# Patient Record
Sex: MALE | Race: WHITE | Employment: OTHER | URBAN - METROPOLITAN AREA
[De-identification: names, ages, dates, MRNs, and addresses within clinical notes are randomized per-mention and may not be internally consistent; named-entity substitution may affect disease eponyms.]

---

## 2018-07-02 PROBLEM — R41.89 OTHER SYMPTOMS AND SIGNS INVOLVING COGNITIVE FUNCTIONS AND AWARENESS: Status: ACTIVE | Noted: 2018-07-02

## 2018-07-02 PROBLEM — R41.89 COGNITIVE CHANGES: Status: ACTIVE | Noted: 2018-07-02

## 2018-07-02 PROBLEM — K59.01 SLOW TRANSIT CONSTIPATION: Status: ACTIVE | Noted: 2018-07-02

## 2018-10-29 PROBLEM — G20 COGNITIVE DEFICIT DUE TO PARKINSON'S DISEASE (HCC): Status: ACTIVE | Noted: 2018-07-02

## 2019-05-03 ENCOUNTER — ANESTHESIA (OUTPATIENT)
Dept: ENDOSCOPY | Age: 84
End: 2019-05-03
Payer: MEDICARE

## 2019-05-03 ENCOUNTER — ANESTHESIA EVENT (OUTPATIENT)
Dept: ENDOSCOPY | Age: 84
End: 2019-05-03
Payer: MEDICARE

## 2019-05-03 ENCOUNTER — HOSPITAL ENCOUNTER (OUTPATIENT)
Age: 84
Setting detail: OUTPATIENT SURGERY
Discharge: HOME | End: 2019-05-03
Attending: INTERNAL MEDICINE | Admitting: INTERNAL MEDICINE
Payer: MEDICARE

## 2019-05-03 VITALS
TEMPERATURE: 98 F | RESPIRATION RATE: 14 BRPM | WEIGHT: 165 LBS | HEIGHT: 68 IN | OXYGEN SATURATION: 98 % | BODY MASS INDEX: 25.01 KG/M2 | HEART RATE: 62 BPM | DIASTOLIC BLOOD PRESSURE: 86 MMHG | SYSTOLIC BLOOD PRESSURE: 191 MMHG

## 2019-05-03 PROCEDURE — 88305 TISSUE EXAM BY PATHOLOGIST: CPT

## 2019-05-03 PROCEDURE — 74011250636 HC RX REV CODE- 250/636: Performed by: INTERNAL MEDICINE

## 2019-05-03 PROCEDURE — 77030021593 HC FCPS BIOP ENDOSC BSC -A: Performed by: INTERNAL MEDICINE

## 2019-05-03 PROCEDURE — 74011250636 HC RX REV CODE- 250/636

## 2019-05-03 PROCEDURE — 76060000032 HC ANESTHESIA 0.5 TO 1 HR: Performed by: INTERNAL MEDICINE

## 2019-05-03 PROCEDURE — 76040000025: Performed by: INTERNAL MEDICINE

## 2019-05-03 RX ORDER — PROPOFOL 10 MG/ML
INJECTION, EMULSION INTRAVENOUS
Status: DISCONTINUED | OUTPATIENT
Start: 2019-05-03 | End: 2019-05-03 | Stop reason: HOSPADM

## 2019-05-03 RX ORDER — SODIUM CHLORIDE, SODIUM LACTATE, POTASSIUM CHLORIDE, CALCIUM CHLORIDE 600; 310; 30; 20 MG/100ML; MG/100ML; MG/100ML; MG/100ML
1000 INJECTION, SOLUTION INTRAVENOUS CONTINUOUS
Status: DISCONTINUED | OUTPATIENT
Start: 2019-05-03 | End: 2019-05-03 | Stop reason: HOSPADM

## 2019-05-03 RX ORDER — PROPOFOL 10 MG/ML
INJECTION, EMULSION INTRAVENOUS AS NEEDED
Status: DISCONTINUED | OUTPATIENT
Start: 2019-05-03 | End: 2019-05-03 | Stop reason: HOSPADM

## 2019-05-03 RX ADMIN — PROPOFOL 100 MCG/KG/MIN: 10 INJECTION, EMULSION INTRAVENOUS at 11:19

## 2019-05-03 RX ADMIN — PROPOFOL 50 MG: 10 INJECTION, EMULSION INTRAVENOUS at 11:19

## 2019-05-03 RX ADMIN — SODIUM CHLORIDE, SODIUM LACTATE, POTASSIUM CHLORIDE, AND CALCIUM CHLORIDE 1000 ML: 600; 310; 30; 20 INJECTION, SOLUTION INTRAVENOUS at 10:10

## 2019-05-03 NOTE — ANESTHESIA PREPROCEDURE EVALUATION
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 
 
CABG in 2011 GI/Hepatic/Renal 
  
 
 
 
 
 
 Endo/Other Other Findings Physical Exam 
 
Airway Mallampati: II 
 
Neck ROM: normal range of motion Mouth opening: Normal 
 
 Cardiovascular Rhythm: regular Dental 
 
Dentition: Implants Pulmonary Breath sounds clear to auscultation Abdominal 
 
 
 
 Other Findings Anesthetic Plan ASA: 3 Anesthesia type: total IV anesthesia Anesthetic plan and risks discussed with: Patient and Spouse

## 2019-05-03 NOTE — DISCHARGE INSTRUCTIONS
Gastrointestinal Colonoscopy/Flexible Sigmoidoscopy - Lower Exam Discharge Instructions  1. Call Dr. Norman Sanchez at [phone] for any problems or questions. 2. Contact the doctors office for follow up appointment as directed  3. Medication may cause drowsiness for several hours, therefore:  · Do not drive or operate machinery for reminder of the day. · No alcohol today. · Do not make any important or legal decisions for 24 hours. · Do not sign any legal documents for 24 hours. 4. Ordinarily, you may resume regular diet and activity after exam unless otherwise specified by your physician. 5. Because of air put into your colon during exam, you may experience some abdominal distension, relieved by the passage of gas, for several hours. 6. Contact your physician if you have any of the following:  · Excessive amount of bleeding - large amount when having a bowel movement. Occasional specks of blood with bowel movement would not be unusual.  · Severe abdominal pain  · Fever or Chills  Any additional instructions: no further screening colonoscopies due to age. Instructions given to Leonela Shields and other family members.

## 2019-05-03 NOTE — ROUTINE PROCESS
VSS. No complaints noted. Education given and reviewed with wife who voiced understanding. Pt wheeled out via wheelchair by Meche Byrnes RN.

## 2019-05-03 NOTE — ANESTHESIA POSTPROCEDURE EVALUATION
Procedure(s): 
COLONOSCOPY/ 27 
ENDOSCOPIC POLYPECTOMY. total IV anesthesia Anesthesia Post Evaluation Multimodal analgesia: multimodal analgesia not used between 6 hours prior to anesthesia start to PACU discharge Patient location during evaluation: PACU Patient participation: complete - patient participated Level of consciousness: awake and alert Pain management: adequate Airway patency: patent Anesthetic complications: no 
Cardiovascular status: acceptable Respiratory status: acceptable Hydration status: acceptable Post anesthesia nausea and vomiting:  none Vitals Value Taken Time /86 5/3/2019 12:22 PM  
Temp Pulse 64 5/3/2019 12:23 PM  
Resp 14 5/3/2019 12:22 PM  
SpO2 100 % 5/3/2019 12:23 PM  
Vitals shown include unvalidated device data.

## 2019-05-03 NOTE — H&P
Gastrointestinal Progress Note 5/3/2019 Admit Date: 5/3/2019 Subjective:  
 
Patient is on NPO for a colonoscopy (family history of colon cancer/constipation) Pain: Patient complains of no abdominal pain. Bowel Movements: constipation Bleeding:  None Current Facility-Administered Medications Medication Dose Route Frequency  lactated Ringers infusion 1,000 mL  1,000 mL IntraVENous CONTINUOUS Objective:  
 
Blood pressure 152/67, pulse (!) 59, temperature 97.6 °F (36.4 °C), resp. rate 15, height 5' 8\" (1.727 m), weight 74.8 kg (165 lb), SpO2 99 %. No intake/output data recorded. No intake/output data recorded. EXAM:  HEART: regular rate and rhythm, S1, S2 normal, no murmur, click, rub or gallop, LUNGS: chest clear, no wheezing, rales, normal symmetric air entry, Heart exam - S1, S2 normal, no murmur, no gallop, rate regular and ABDOMEN:  Bowel sounds are normal, liver is not enlarged, spleen is not enlarged Data Review  No results found for this or any previous visit (from the past 24 hour(s)). Assessment:  
 
Active Problems: 
Constipation Family history of colon cancer Parkinson's disease S/P CABG Plan:  
 
Colonoscopy. Risks (bleed, perforation, infection, untoward CV or resp. Event, mortality, etc.), benefits and alternatives were discussed with the patient who agrees to proceed.  Balwinder Blake MD

## 2019-05-04 NOTE — OP NOTES
[facility]  OPERATIVE REPORT    Name:  Gay Jiménez  MR#:  [id number]  :  1935  ACCOUNT #:  [de-identified]  DATE OF SERVICE:  2019    PREOPERATIVE DIAGNOSES:  1. Constipation. 2.  Family history of colon carcinoma. POSTOPERATIVE DIAGNOSES:  1. Colon polyp. 2.  Sigmoid diverticulosis. PROCEDURE PERFORMED:  Colonoscopy. SURGEON:  MARIA C    PRIMARY GASTROENTEROLOGIST:  Eliot Richard MD    ASSISTANT:  None. ANESTHESIA:  As per MAC anesthesia. INSTRUMENT:  Pediatric (PCF-190) video colonoscope. COMPLICATIONS:  None. PATHOLOGY/SPECIMENS REMOVED:  Sigmoid polyp. IMPLANTS:  None. ESTIMATED BLOOD LOSS:  0-1 mL. SUBJECTIVE:  An 27-year-old male who is undergoing a colonoscopy because of a strong family history of colon carcinoma as well as constipation. The patient does carry a diagnosis of Parkinson's disease. Colonoscopy is done today to document the presence or absence of any inflammatory or neoplastic process of the lower GI tract. PROCEDURE IN DETAIL:  The patient was anesthetized and positioned, a rectal examination was performed which was unremarkable. The pediatric colonoscope was inserted into the rectal vault and procedure was begun. Under direct visualization, the cecum and ileocecal valve was identified. The endoscope was removed from the cecum. The ascending, transverse, and descending colon were unremarkable. In the sigmoid colon, diverticular disease was seen. The patient had somewhat tortous left colon. There was also a small 5-mm polyp that was cold biopsied away. After documentation of hemostasis, the endoscope was backed to the rectum. Retroflexed view of the anal verge revealed very small hemorrhoids. The endoscope was placed at the end view and removed from the patient. The patient tolerated the procedure and taken to recovery area in stable condition. IMPRESSION:  1. Sigmoid polyp that was biopsied away.   2. Sigmoid diverticulosis. 3.  Internal hemorrhoids. PLAN:  DIAGNOSTIC:  1. Follow up biopsies. 2.  No further surveillance colonoscopies given the patient's age and comorbidity. THERAPEUTIC:  1. High fiber diet and bulk agents. 2.  Further recommendations pending above.         Zaire Cardona MD      [id number]  D:  05/03/2019 12:19  T:  05/03/2019 21:03  JOB #:  [id number]  CC:  Shepard Boxer, MD       [facility]       Marceline Cranker, MD

## 2019-10-30 PROBLEM — G20 DYSKINESIA DUE TO PARKINSON'S DISEASE (HCC): Status: ACTIVE | Noted: 2019-10-30

## 2019-10-30 PROBLEM — G24.9 DYSKINESIA DUE TO PARKINSON'S DISEASE (HCC): Status: ACTIVE | Noted: 2019-10-30

## 2021-01-13 PROBLEM — K59.01 SLOW TRANSIT CONSTIPATION: Status: RESOLVED | Noted: 2018-07-02 | Resolved: 2021-01-13

## 2021-01-13 PROBLEM — R44.3 HALLUCINATION: Status: ACTIVE | Noted: 2021-01-13

## 2021-11-18 ENCOUNTER — HOSPITAL ENCOUNTER (OUTPATIENT)
Dept: LAB | Age: 86
Discharge: HOME | End: 2021-11-18
Payer: MEDICARE

## 2021-11-18 LAB
BASOPHILS # BLD: 0.1 K/UL (ref 0–0.2)
BASOPHILS NFR BLD: 1 % (ref 0–2)
DIFFERENTIAL METHOD BLD: ABNORMAL
EOSINOPHIL # BLD: 0.8 K/UL (ref 0–0.8)
EOSINOPHIL NFR BLD: 11 % (ref 0.5–7.8)
ERYTHROCYTE [DISTWIDTH] IN BLOOD BY AUTOMATED COUNT: 12.3 % (ref 11.9–14.6)
HCT VFR BLD AUTO: 43.4 % (ref 41.1–50.3)
HGB BLD-MCNC: 13.7 G/DL (ref 13.6–17.2)
IMM GRANULOCYTES # BLD AUTO: 0 K/UL (ref 0–0.5)
IMM GRANULOCYTES NFR BLD AUTO: 0 % (ref 0–5)
LYMPHOCYTES # BLD: 1.7 K/UL (ref 0.5–4.6)
LYMPHOCYTES NFR BLD: 25 % (ref 13–44)
MCH RBC QN AUTO: 30.9 PG (ref 26.1–32.9)
MCHC RBC AUTO-ENTMCNC: 31.6 G/DL (ref 31.4–35)
MCV RBC AUTO: 98 FL (ref 79.6–97.8)
MONOCYTES # BLD: 0.8 K/UL (ref 0.1–1.3)
MONOCYTES NFR BLD: 11 % (ref 4–12)
NEUTS SEG # BLD: 3.7 K/UL (ref 1.7–8.2)
NEUTS SEG NFR BLD: 52 % (ref 43–78)
NRBC # BLD: 0 K/UL (ref 0–0.2)
PLATELET # BLD AUTO: 198 K/UL (ref 150–450)
PMV BLD AUTO: 11.8 FL (ref 9.4–12.3)
POTASSIUM SERPL-SCNC: 3.8 MMOL/L (ref 3.5–5.1)
RBC # BLD AUTO: 4.43 M/UL (ref 4.23–5.6)
WBC # BLD AUTO: 7 K/UL (ref 4.3–11.1)

## 2021-11-18 PROCEDURE — 36415 COLL VENOUS BLD VENIPUNCTURE: CPT

## 2021-11-18 PROCEDURE — 85025 COMPLETE CBC W/AUTO DIFF WBC: CPT

## 2021-11-18 PROCEDURE — 84132 ASSAY OF SERUM POTASSIUM: CPT

## 2022-01-13 ENCOUNTER — HOSPITAL ENCOUNTER (INPATIENT)
Age: 87
LOS: 11 days | Discharge: NURSING, REHAB OR LONG-TERM CARE | DRG: 056 | End: 2022-01-26
Attending: EMERGENCY MEDICINE | Admitting: HOSPITALIST
Payer: MEDICARE

## 2022-01-13 ENCOUNTER — APPOINTMENT (OUTPATIENT)
Dept: GENERAL RADIOLOGY | Age: 87
DRG: 056 | End: 2022-01-13
Attending: EMERGENCY MEDICINE
Payer: MEDICARE

## 2022-01-13 ENCOUNTER — APPOINTMENT (OUTPATIENT)
Dept: CT IMAGING | Age: 87
DRG: 056 | End: 2022-01-13
Attending: EMERGENCY MEDICINE
Payer: MEDICARE

## 2022-01-13 DIAGNOSIS — Z51.5 ENCOUNTER FOR PALLIATIVE CARE: ICD-10-CM

## 2022-01-13 DIAGNOSIS — T42.8X5A: ICD-10-CM

## 2022-01-13 DIAGNOSIS — N17.9 AKI (ACUTE KIDNEY INJURY) (HCC): Primary | ICD-10-CM

## 2022-01-13 DIAGNOSIS — F03.91 DEMENTIA WITH BEHAVIORAL DISTURBANCE, UNSPECIFIED DEMENTIA TYPE: ICD-10-CM

## 2022-01-13 DIAGNOSIS — R53.81 DEBILITY: ICD-10-CM

## 2022-01-13 DIAGNOSIS — G20 PARKINSON DISEASE (HCC): ICD-10-CM

## 2022-01-13 DIAGNOSIS — Z71.89 ACP (ADVANCE CARE PLANNING): ICD-10-CM

## 2022-01-13 DIAGNOSIS — R44.3 HALLUCINATIONS: ICD-10-CM

## 2022-01-13 DIAGNOSIS — G20: ICD-10-CM

## 2022-01-13 DIAGNOSIS — F41.9 ANXIETY: ICD-10-CM

## 2022-01-13 PROBLEM — J15.9 BACTERIAL PNEUMONIA: Status: ACTIVE | Noted: 2022-01-13

## 2022-01-13 PROBLEM — G93.41 METABOLIC ENCEPHALOPATHY: Status: ACTIVE | Noted: 2022-01-13

## 2022-01-13 LAB
ALBUMIN SERPL-MCNC: 4.3 G/DL (ref 3.2–4.6)
ALBUMIN/GLOB SERPL: 1 {RATIO} (ref 1.2–3.5)
ALP SERPL-CCNC: 117 U/L (ref 50–136)
ALT SERPL-CCNC: 17 U/L (ref 12–65)
AMMONIA PLAS-SCNC: 11 UMOL/L (ref 11–32)
ANION GAP SERPL CALC-SCNC: 7 MMOL/L (ref 7–16)
APPEARANCE UR: CLEAR
AST SERPL-CCNC: 31 U/L (ref 15–37)
B PERT DNA SPEC QL NAA+PROBE: NOT DETECTED
BACTERIA URNS QL MICRO: 0 /HPF
BASOPHILS # BLD: 0.1 K/UL (ref 0–0.2)
BASOPHILS NFR BLD: 1 % (ref 0–2)
BILIRUB SERPL-MCNC: 0.7 MG/DL (ref 0.2–1.1)
BILIRUB UR QL: NEGATIVE
BORDETELLA PARAPERTUSSIS PCR, BORPAR: NOT DETECTED
BUN SERPL-MCNC: 42 MG/DL (ref 8–23)
C PNEUM DNA SPEC QL NAA+PROBE: NOT DETECTED
CALCIUM SERPL-MCNC: 10.1 MG/DL (ref 8.3–10.4)
CASTS URNS QL MICRO: ABNORMAL /LPF
CHLORIDE SERPL-SCNC: 110 MMOL/L (ref 98–107)
CO2 SERPL-SCNC: 28 MMOL/L (ref 21–32)
COLOR UR: YELLOW
COVID-19 RAPID TEST, COVR: NOT DETECTED
CREAT SERPL-MCNC: 1.46 MG/DL (ref 0.8–1.5)
DIFFERENTIAL METHOD BLD: ABNORMAL
EOSINOPHIL # BLD: 0.2 K/UL (ref 0–0.8)
EOSINOPHIL NFR BLD: 2 % (ref 0.5–7.8)
EPI CELLS #/AREA URNS HPF: ABNORMAL /HPF
ERYTHROCYTE [DISTWIDTH] IN BLOOD BY AUTOMATED COUNT: 12.5 % (ref 11.9–14.6)
FLUAV SUBTYP SPEC NAA+PROBE: NOT DETECTED
FLUBV RNA SPEC QL NAA+PROBE: NOT DETECTED
GLOBULIN SER CALC-MCNC: 4.3 G/DL (ref 2.3–3.5)
GLUCOSE SERPL-MCNC: 94 MG/DL (ref 65–100)
GLUCOSE UR STRIP.AUTO-MCNC: NEGATIVE MG/DL
HADV DNA SPEC QL NAA+PROBE: NOT DETECTED
HCOV 229E RNA SPEC QL NAA+PROBE: NOT DETECTED
HCOV HKU1 RNA SPEC QL NAA+PROBE: NOT DETECTED
HCOV NL63 RNA SPEC QL NAA+PROBE: NOT DETECTED
HCOV OC43 RNA SPEC QL NAA+PROBE: NOT DETECTED
HCT VFR BLD AUTO: 46.1 % (ref 41.1–50.3)
HGB BLD-MCNC: 14.7 G/DL (ref 13.6–17.2)
HGB UR QL STRIP: NEGATIVE
HMPV RNA SPEC QL NAA+PROBE: NOT DETECTED
HPIV1 RNA SPEC QL NAA+PROBE: NOT DETECTED
HPIV2 RNA SPEC QL NAA+PROBE: NOT DETECTED
HPIV3 RNA SPEC QL NAA+PROBE: NOT DETECTED
HPIV4 RNA SPEC QL NAA+PROBE: NOT DETECTED
IMM GRANULOCYTES # BLD AUTO: 0 K/UL (ref 0–0.5)
IMM GRANULOCYTES NFR BLD AUTO: 0 % (ref 0–5)
KETONES UR QL STRIP.AUTO: ABNORMAL MG/DL
LACTATE SERPL-SCNC: 1.2 MMOL/L (ref 0.4–2)
LEUKOCYTE ESTERASE UR QL STRIP.AUTO: NEGATIVE
LYMPHOCYTES # BLD: 1.7 K/UL (ref 0.5–4.6)
LYMPHOCYTES NFR BLD: 24 % (ref 13–44)
M PNEUMO DNA SPEC QL NAA+PROBE: NOT DETECTED
MCH RBC QN AUTO: 31.3 PG (ref 26.1–32.9)
MCHC RBC AUTO-ENTMCNC: 31.9 G/DL (ref 31.4–35)
MCV RBC AUTO: 98.3 FL (ref 79.6–97.8)
MONOCYTES # BLD: 0.7 K/UL (ref 0.1–1.3)
MONOCYTES NFR BLD: 10 % (ref 4–12)
MUCOUS THREADS URNS QL MICRO: ABNORMAL /LPF
NEUTS SEG # BLD: 4.4 K/UL (ref 1.7–8.2)
NEUTS SEG NFR BLD: 63 % (ref 43–78)
NITRITE UR QL STRIP.AUTO: NEGATIVE
NRBC # BLD: 0 K/UL (ref 0–0.2)
PH UR STRIP: 5.5 [PH] (ref 5–9)
PLATELET # BLD AUTO: 184 K/UL (ref 150–450)
PMV BLD AUTO: 11.6 FL (ref 9.4–12.3)
POTASSIUM SERPL-SCNC: 4.5 MMOL/L (ref 3.5–5.1)
PROCALCITONIN SERPL-MCNC: <0.05 NG/ML (ref 0–0.49)
PROT SERPL-MCNC: 8.6 G/DL (ref 6.3–8.2)
PROT UR STRIP-MCNC: ABNORMAL MG/DL
RBC # BLD AUTO: 4.69 M/UL (ref 4.23–5.6)
RBC #/AREA URNS HPF: ABNORMAL /HPF
RSV RNA SPEC QL NAA+PROBE: NOT DETECTED
RV+EV RNA SPEC QL NAA+PROBE: NOT DETECTED
SARS-COV-2 PCR, COVPCR: NOT DETECTED
SODIUM SERPL-SCNC: 145 MMOL/L (ref 138–145)
SOURCE, COVRS: NORMAL
SP GR UR REFRACTOMETRY: 1.02 (ref 1–1.02)
T4 FREE SERPL-MCNC: 1 NG/DL (ref 0.78–1.46)
TSH SERPL DL<=0.005 MIU/L-ACNC: 1.4 UIU/ML (ref 0.36–3.74)
UROBILINOGEN UR QL STRIP.AUTO: 1 EU/DL (ref 0.2–1)
WBC # BLD AUTO: 7 K/UL (ref 4.3–11.1)
WBC URNS QL MICRO: ABNORMAL /HPF

## 2022-01-13 PROCEDURE — 99285 EMERGENCY DEPT VISIT HI MDM: CPT

## 2022-01-13 PROCEDURE — 85025 COMPLETE CBC W/AUTO DIFF WBC: CPT

## 2022-01-13 PROCEDURE — 96376 TX/PRO/DX INJ SAME DRUG ADON: CPT

## 2022-01-13 PROCEDURE — 74011000250 HC RX REV CODE- 250: Performed by: FAMILY MEDICINE

## 2022-01-13 PROCEDURE — 71045 X-RAY EXAM CHEST 1 VIEW: CPT

## 2022-01-13 PROCEDURE — 96374 THER/PROPH/DIAG INJ IV PUSH: CPT

## 2022-01-13 PROCEDURE — 70450 CT HEAD/BRAIN W/O DYE: CPT

## 2022-01-13 PROCEDURE — 84145 PROCALCITONIN (PCT): CPT

## 2022-01-13 PROCEDURE — 83605 ASSAY OF LACTIC ACID: CPT

## 2022-01-13 PROCEDURE — 74011000250 HC RX REV CODE- 250: Performed by: EMERGENCY MEDICINE

## 2022-01-13 PROCEDURE — 84443 ASSAY THYROID STIM HORMONE: CPT

## 2022-01-13 PROCEDURE — 80053 COMPREHEN METABOLIC PANEL: CPT

## 2022-01-13 PROCEDURE — 94761 N-INVAS EAR/PLS OXIMETRY MLT: CPT

## 2022-01-13 PROCEDURE — 74011250637 HC RX REV CODE- 250/637: Performed by: FAMILY MEDICINE

## 2022-01-13 PROCEDURE — 74011250636 HC RX REV CODE- 250/636: Performed by: EMERGENCY MEDICINE

## 2022-01-13 PROCEDURE — 87635 SARS-COV-2 COVID-19 AMP PRB: CPT

## 2022-01-13 PROCEDURE — 87040 BLOOD CULTURE FOR BACTERIA: CPT

## 2022-01-13 PROCEDURE — 81003 URINALYSIS AUTO W/O SCOPE: CPT

## 2022-01-13 PROCEDURE — 93005 ELECTROCARDIOGRAM TRACING: CPT | Performed by: EMERGENCY MEDICINE

## 2022-01-13 PROCEDURE — 74011250636 HC RX REV CODE- 250/636: Performed by: FAMILY MEDICINE

## 2022-01-13 PROCEDURE — 0202U NFCT DS 22 TRGT SARS-COV-2: CPT

## 2022-01-13 PROCEDURE — 96365 THER/PROPH/DIAG IV INF INIT: CPT

## 2022-01-13 PROCEDURE — 73130 X-RAY EXAM OF HAND: CPT

## 2022-01-13 PROCEDURE — 82140 ASSAY OF AMMONIA: CPT

## 2022-01-13 PROCEDURE — 74011000258 HC RX REV CODE- 258: Performed by: FAMILY MEDICINE

## 2022-01-13 PROCEDURE — 84439 ASSAY OF FREE THYROXINE: CPT

## 2022-01-13 PROCEDURE — G0378 HOSPITAL OBSERVATION PER HR: HCPCS

## 2022-01-13 RX ORDER — LABETALOL HYDROCHLORIDE 5 MG/ML
10 INJECTION, SOLUTION INTRAVENOUS ONCE
Status: COMPLETED | OUTPATIENT
Start: 2022-01-13 | End: 2022-01-13

## 2022-01-13 RX ORDER — RASAGILINE 1 MG/1
1 TABLET ORAL DAILY
Refills: 3 | Status: DISCONTINUED | OUTPATIENT
Start: 2022-01-14 | End: 2022-01-14

## 2022-01-13 RX ORDER — SODIUM CHLORIDE 0.9 % (FLUSH) 0.9 %
5-40 SYRINGE (ML) INJECTION AS NEEDED
Status: DISCONTINUED | OUTPATIENT
Start: 2022-01-13 | End: 2022-01-26 | Stop reason: HOSPADM

## 2022-01-13 RX ORDER — MELATONIN
1000 DAILY
Status: DISCONTINUED | OUTPATIENT
Start: 2022-01-14 | End: 2022-01-14

## 2022-01-13 RX ORDER — SODIUM CHLORIDE 0.9 % (FLUSH) 0.9 %
5-40 SYRINGE (ML) INJECTION EVERY 8 HOURS
Status: DISCONTINUED | OUTPATIENT
Start: 2022-01-13 | End: 2022-01-26 | Stop reason: HOSPADM

## 2022-01-13 RX ORDER — CARBIDOPA AND LEVODOPA 25; 100 MG/1; MG/1
2 TABLET, EXTENDED RELEASE ORAL
Status: DISCONTINUED | OUTPATIENT
Start: 2022-01-14 | End: 2022-01-26 | Stop reason: HOSPADM

## 2022-01-13 RX ORDER — QUETIAPINE FUMARATE 25 MG/1
75 TABLET, FILM COATED ORAL
Status: DISCONTINUED | OUTPATIENT
Start: 2022-01-13 | End: 2022-01-14

## 2022-01-13 RX ORDER — CARBIDOPA AND LEVODOPA 25; 100 MG/1; MG/1
1.5 TABLET ORAL 4 TIMES DAILY
Status: DISCONTINUED | OUTPATIENT
Start: 2022-01-13 | End: 2022-01-13

## 2022-01-13 RX ORDER — ASPIRIN 81 MG/1
81 TABLET ORAL DAILY
Status: DISCONTINUED | OUTPATIENT
Start: 2022-01-14 | End: 2022-01-17

## 2022-01-13 RX ORDER — CLONIDINE HYDROCHLORIDE 0.1 MG/1
0.1 TABLET ORAL 2 TIMES DAILY
Status: DISCONTINUED | OUTPATIENT
Start: 2022-01-13 | End: 2022-01-14

## 2022-01-13 RX ORDER — ACETAMINOPHEN 650 MG/1
650 SUPPOSITORY RECTAL
Status: DISCONTINUED | OUTPATIENT
Start: 2022-01-13 | End: 2022-01-26 | Stop reason: HOSPADM

## 2022-01-13 RX ORDER — CARBIDOPA AND LEVODOPA 25; 100 MG/1; MG/1
1.5 TABLET ORAL 4 TIMES DAILY
Status: DISCONTINUED | OUTPATIENT
Start: 2022-01-14 | End: 2022-01-26 | Stop reason: HOSPADM

## 2022-01-13 RX ORDER — ACETAMINOPHEN 325 MG/1
650 TABLET ORAL
Status: DISCONTINUED | OUTPATIENT
Start: 2022-01-13 | End: 2022-01-26 | Stop reason: HOSPADM

## 2022-01-13 RX ORDER — ATORVASTATIN CALCIUM 40 MG/1
80 TABLET, FILM COATED ORAL
Status: DISCONTINUED | OUTPATIENT
Start: 2022-01-13 | End: 2022-01-14

## 2022-01-13 RX ORDER — ONDANSETRON 4 MG/1
4 TABLET, ORALLY DISINTEGRATING ORAL
Status: DISCONTINUED | OUTPATIENT
Start: 2022-01-13 | End: 2022-01-26 | Stop reason: HOSPADM

## 2022-01-13 RX ORDER — ONDANSETRON 2 MG/ML
4 INJECTION INTRAMUSCULAR; INTRAVENOUS
Status: DISCONTINUED | OUTPATIENT
Start: 2022-01-13 | End: 2022-01-26 | Stop reason: HOSPADM

## 2022-01-13 RX ORDER — SODIUM CHLORIDE 0.9 % (FLUSH) 0.9 %
5-10 SYRINGE (ML) INJECTION AS NEEDED
Status: DISCONTINUED | OUTPATIENT
Start: 2022-01-13 | End: 2022-01-14

## 2022-01-13 RX ORDER — LABETALOL HYDROCHLORIDE 5 MG/ML
5 INJECTION, SOLUTION INTRAVENOUS ONCE
Status: COMPLETED | OUTPATIENT
Start: 2022-01-13 | End: 2022-01-13

## 2022-01-13 RX ORDER — POLYETHYLENE GLYCOL 3350 17 G/17G
17 POWDER, FOR SOLUTION ORAL DAILY PRN
Status: DISCONTINUED | OUTPATIENT
Start: 2022-01-13 | End: 2022-01-26 | Stop reason: HOSPADM

## 2022-01-13 RX ORDER — SODIUM CHLORIDE 0.9 % (FLUSH) 0.9 %
5-10 SYRINGE (ML) INJECTION EVERY 8 HOURS
Status: DISCONTINUED | OUTPATIENT
Start: 2022-01-13 | End: 2022-01-14

## 2022-01-13 RX ADMIN — ATORVASTATIN CALCIUM 80 MG: 40 TABLET, FILM COATED ORAL at 21:55

## 2022-01-13 RX ADMIN — SODIUM CHLORIDE 30 ML/HR: 900 INJECTION, SOLUTION INTRAVENOUS at 20:06

## 2022-01-13 RX ADMIN — SODIUM CHLORIDE, PRESERVATIVE FREE 10 ML: 5 INJECTION INTRAVENOUS at 23:08

## 2022-01-13 RX ADMIN — LABETALOL HYDROCHLORIDE 5 MG: 5 INJECTION INTRAVENOUS at 18:54

## 2022-01-13 RX ADMIN — CLONIDINE HYDROCHLORIDE 0.1 MG: 0.1 TABLET ORAL at 21:55

## 2022-01-13 RX ADMIN — SODIUM CHLORIDE, PRESERVATIVE FREE 10 ML: 5 INJECTION INTRAVENOUS at 18:55

## 2022-01-13 RX ADMIN — LABETALOL HYDROCHLORIDE 10 MG: 5 INJECTION INTRAVENOUS at 22:01

## 2022-01-13 RX ADMIN — QUETIAPINE FUMARATE 75 MG: 25 TABLET ORAL at 21:55

## 2022-01-13 RX ADMIN — PIPERACILLIN SODIUM AND TAZOBACTAM SODIUM 3.38 G: 3; .375 INJECTION, POWDER, LYOPHILIZED, FOR SOLUTION INTRAVENOUS at 23:06

## 2022-01-13 NOTE — ED TRIAGE NOTES
Patient arrives via [facility] from the [facility] at [facility]. Masked. Yesterday punched a chair after having hallucinations. Normally alert and oriented x4. Able to state location, unsure of year and confusion regarding events.

## 2022-01-13 NOTE — ED PROVIDER NOTES
Patient is a pleasant 57-year-old male presenting to the emergency department today secondary to increasing altered mental status. He lives in an independent care facility at [facility] and his family visited him yesterday. He was hallucinating and thought someone was in a chair and he punched the chair with his left hand. The patient thinks is 0 and cannot tell me where he is at but he does know his name. The patient has some complaints of myalgias in the thighs bilaterally but no weakness.  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 Jeanne Chen)     Cognitive changes 7/2/2018    Colon polyp 7/26/2016    Overview:   (f/u in 2012)     Diplopia 7/26/2016    (Dr. Meagan Trinidad and Dr. Adelaide Brittle)     Diverticulosis     Diverticulosis     Elevated prostate specific antigen (PSA) 11/27/2013    History of BPH     HTN (hypertension) 11/27/2013    Hypercholesteraemia     Hypercholesteremia 11/27/2013    Hyperlipidemia 7/26/2016    Hypertension     contrplled by medications    Lacunar infarct, acute ([address] 7/26/2016    lacunar infarct with 6 CN palsy (post CABG)- Dr. Rey traore     Malignant neoplasm of prostate ([address] 9/13/2010    Overview:  (Dr. Sue Ochoa)     Nodular prostate without urinary obstruction 11/27/2013    Parkinson's disease ([address] 07/26/2016    followed by Dr. El Shukla Pneumonia     Prostate cancer [facility])     Pulmonary HTN ([address]     Sebaceous cyst 11/27/2013    Skin cancer     Slow transit constipation 7/2/2018    Stroke ([address]     tia that affected eye    Thromboembolus ([address]     pulmonary embolus    Unspecified adverse effect of anesthesia     pt states that he has HX of being sensative  to some anesthesias    Vitamin D deficiency        Past Surgical History:   Procedure Laterality Date    COLONOSCOPY N/A 5/3/2019    COLONOSCOPY/  performed by Luciana Wayne [facility] at [facility] HX CATARACT REMOVAL Right 2014    HX CATARACT REMOVAL Left     HX COLONOSCOPY  2013    (Dr. Chace Burks)    HX CORONARY ARTERY BYPASS GRAFT  2011    CABG x7 (Dr. Betty Calero)    HX CYST REMOVAL  2012    cyst excision    HX HEENT      pt had a hot needle to soft palate to help snoring    HX OTHER SURGICAL Right     retina sx     HX OTHER SURGICAL      Bilateral Vats Ablation    HX PROSTATECTOMY      ROBOTIC    HX UROLOGICAL  ? circumcision    HX UROLOGICAL  2017    PROSTATE BIOPSY    UT CARDIAC SURG PROCEDURE UNLIST      cabg          Family History:   Problem Relation Age of Onset    Lung Disease Mother         copd    Cancer Sister     Cancer Sister     Hypertension Other         GEN FAM HX        Social History     Socioeconomic History    Marital status:      Spouse name: Not on file    Number of children: Not on file    Years of education: Not on file    Highest education level: Not on file   Occupational History    Not on file   Tobacco Use    Smoking status: Former Smoker     Packs/day: 1.00     Years: 22.00     Pack years: 22.00     Quit date:      Years since quittin.0    Smokeless tobacco: Never Used    Tobacco comment:    Substance and Sexual Activity    Alcohol use:  Yes     Alcohol/week: 0.8 standard drinks     Types: 1 Glasses of wine per week     Comment: daily at dinner    Drug use: No    Sexual activity: Not on file   Other Topics Concern    Not on file   Social History Narrative    Not on file     Social Determinants of Health     Financial Resource Strain:     Difficulty of Paying Living Expenses: Not on file   Food Insecurity:     Worried About Running Out of Food in the Last Year: Not on file    Ran Out of Food in the Last Year: Not on file   Transportation Needs:     Lack of Transportation (Medical): Not on file    Lack of Transportation (Non-Medical): Not on file   Physical Activity:     Days of Exercise per Week: Not on file    Minutes of Exercise per Session: Not on file   Stress:     Feeling of Stress : Not on file   Social Connections:     Frequency of Communication with Friends and Family: Not on file    Frequency of Social Gatherings with Friends and Family: Not on file    Attends Druze Services: Not on file    Active Member of [address] [place] [address] [employer] or Organizations: Not on file    Attends Club or Organization Meetings: Not on file    Marital Status: Not on file   Intimate Partner Violence:     Fear of Current or Ex-Partner: Not on file    Emotionally Abused: Not on file    Physically Abused: Not on file    Sexually Abused: Not on file   Housing Stability:     Unable to Pay for Housing in the Last Year: Not on file    Number of [place] in the Last Year: Not on file    Unstable Housing in the Last Year: Not on file         ALLERGIES: Adhesive tape    Review of Systems   Unable to perform ROS: Dementia       Vitals:    01/13/22 1900 01/13/22 1919 01/13/22 1919 01/13/22 2000   BP: (!) 205/93 (!) 200/97  (!) 168/95   Pulse: 76 76  83   Resp: 16 15  26   Temp:       SpO2: 97% 98%  95%   Weight:   70.3 kg (155 lb)    Height:   5' 8\" (1.727 m)             Physical Exam     GENERAL:The patient has Body mass index is 23.57 kg/m². Well-hydrated. VITAL SIGNS: Heart rate, blood pressure, respiratory rate reviewed as recorded in  nurse's notes  HEAD: No evidence of trauma noted. EYES: Pupils reactive. Extraocular motion intact. No conjunctival redness or drainage. MOUTH/THROAT: Pharynx clear; airway patent. NECK: Supple, no meningeal signs. Trachea midline. No masses or thyromegaly.   LUNGS: Breath sounds clear and equal bilaterally no accessory muscle use. CHEST: No deformity  CARDIOVASCULAR: Regular rate and rhythm  ABDOMEN: Soft without tenderness. No palpable masses or organomegaly. No  peritoneal signs. No rigidity. EXTREMITIES: Patient's left hand is bruised and he has an abrasion between the second and third MCP joint on the dorsal aspect. No obvious bony deformity appreciated. NEUROLOGIC: Sensation is grossly intact. Cranial nerve exam reveals face is  symmetrical, tongue is midline speech is clear. SKIN: No rash or petechiae. Good skin turgor palpated. PSYCHIATRIC: Alert and oriented. Appropriate behavior and judgment. MDM  Number of Diagnoses or Management Options  Diagnosis management comments: Alcohol withdrawal, alcohol intoxication, brain tumor, TIA, substance abuse, subarachnoid  hemorrhage, CVA, somatization disorder, peripheral neuropathy, multiple sclerosis, medication  related, infection, hyponatremia, hypoglycemia, hyperthyroidism, hypertensive encephalopathy,  hypercarbia, herniated disc, hepatic encephalopathy hemorrhagic stroke, electrolyte imbalance,  electrolyte abnormality, drug toxicity, depression, dementia, dehydration, calm acute  migraine,CNS vasculitis, CNS infection, carotid artery dissection, monoxide poisoning,psychiatric  illness         Amount and/or Complexity of Data Reviewed  Clinical lab tests: ordered and reviewed  Tests in the radiology section of CPT®: ordered and reviewed  Tests in the medicine section of CPT®: reviewed and ordered  Obtain history from someone other than the patient: yes  Review and summarize past medical records: yes  Discuss the patient with other providers: yes  Independent visualization of images, tracings, or specimens: yes      ED Course as of 01/13/22 2026   Thu Jan 13, 2022 1942 CT HEAD WO CONT  IMPRESSION  No CT evidence of acute intracranial abnormality. [KH]   1942 XR HAND LT MIN 3 V  IMPRESSION  Osteoarthritis. No acute findings.  [KH]   1943 XR CHEST PORT  IMPRESSION  1. Mild peripheral opacities in the mid and lower lung fields. An early or mild  peripheral infiltrate as can occur with a viral pneumonia such as Covid 19 is  not excluded. This could either represent active infection or post inflammatory  changes if the patient has recently had Covid pneumonia. Recommend clinical  correlation. [QB]   1036 JQCYP-43 rapid test: Not detected [KH]   1943 Creatinine: 1.46 [KH]   1943 BUN(!): 42 [KH]   2024 Case discussed with Dr. Kristen Sacks the hospitalist on-call and she will come and see the patient. 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   She is planning on putting the patient in for observation overnight and monitoring him closely while the respiratory viral panel is pending. [KH]      ED Course User Index  [KH] Katelynn Neither, DO       Procedures

## 2022-01-14 LAB
ANION GAP SERPL CALC-SCNC: 7 MMOL/L (ref 7–16)
ATRIAL RATE: 83 BPM
BASOPHILS # BLD: 0.1 K/UL (ref 0–0.2)
BASOPHILS NFR BLD: 1 % (ref 0–2)
BUN SERPL-MCNC: 33 MG/DL (ref 8–23)
CALCIUM SERPL-MCNC: 9.5 MG/DL (ref 8.3–10.4)
CALCULATED P AXIS, ECG09: 73 DEGREES
CALCULATED R AXIS, ECG10: -11 DEGREES
CALCULATED T AXIS, ECG11: 17 DEGREES
CHLORIDE SERPL-SCNC: 112 MMOL/L (ref 98–107)
CO2 SERPL-SCNC: 27 MMOL/L (ref 21–32)
CREAT SERPL-MCNC: 1.12 MG/DL (ref 0.8–1.5)
DIAGNOSIS, 93000: NORMAL
DIFFERENTIAL METHOD BLD: ABNORMAL
EOSINOPHIL # BLD: 0.2 K/UL (ref 0–0.8)
EOSINOPHIL NFR BLD: 3 % (ref 0.5–7.8)
ERYTHROCYTE [DISTWIDTH] IN BLOOD BY AUTOMATED COUNT: 12.4 % (ref 11.9–14.6)
GLUCOSE SERPL-MCNC: 89 MG/DL (ref 65–100)
HCT VFR BLD AUTO: 42.6 % (ref 41.1–50.3)
HGB BLD-MCNC: 13.5 G/DL (ref 13.6–17.2)
IMM GRANULOCYTES # BLD AUTO: 0.1 K/UL (ref 0–0.5)
IMM GRANULOCYTES NFR BLD AUTO: 1 % (ref 0–5)
LYMPHOCYTES # BLD: 1.4 K/UL (ref 0.5–4.6)
LYMPHOCYTES NFR BLD: 20 % (ref 13–44)
MCH RBC QN AUTO: 30.3 PG (ref 26.1–32.9)
MCHC RBC AUTO-ENTMCNC: 31.7 G/DL (ref 31.4–35)
MCV RBC AUTO: 95.7 FL (ref 79.6–97.8)
MONOCYTES # BLD: 0.6 K/UL (ref 0.1–1.3)
MONOCYTES NFR BLD: 8 % (ref 4–12)
NEUTS SEG # BLD: 4.8 K/UL (ref 1.7–8.2)
NEUTS SEG NFR BLD: 67 % (ref 43–78)
NRBC # BLD: 0 K/UL (ref 0–0.2)
P-R INTERVAL, ECG05: 162 MS
PLATELET # BLD AUTO: 181 K/UL (ref 150–450)
PMV BLD AUTO: 11.4 FL (ref 9.4–12.3)
POTASSIUM SERPL-SCNC: 4.2 MMOL/L (ref 3.5–5.1)
Q-T INTERVAL, ECG07: 419 MS
QRS DURATION, ECG06: 159 MS
QTC CALCULATION (BEZET), ECG08: 493 MS
RBC # BLD AUTO: 4.45 M/UL (ref 4.23–5.6)
SODIUM SERPL-SCNC: 146 MMOL/L (ref 138–145)
VENTRICULAR RATE, ECG03: 83 BPM
WBC # BLD AUTO: 7.2 K/UL (ref 4.3–11.1)

## 2022-01-14 PROCEDURE — APPSS45 APP SPLIT SHARED TIME 31-45 MINUTES: Performed by: NURSE PRACTITIONER

## 2022-01-14 PROCEDURE — 2709999900 HC NON-CHARGEABLE SUPPLY

## 2022-01-14 PROCEDURE — 96376 TX/PRO/DX INJ SAME DRUG ADON: CPT

## 2022-01-14 PROCEDURE — 99214 OFFICE O/P EST MOD 30 MIN: CPT | Performed by: PSYCHIATRY & NEUROLOGY

## 2022-01-14 PROCEDURE — 74011000250 HC RX REV CODE- 250: Performed by: HOSPITALIST

## 2022-01-14 PROCEDURE — G0378 HOSPITAL OBSERVATION PER HR: HCPCS

## 2022-01-14 PROCEDURE — 77030040393 HC DRSG OPTIFOAM GENT MDII -B

## 2022-01-14 PROCEDURE — 74011250637 HC RX REV CODE- 250/637: Performed by: HOSPITALIST

## 2022-01-14 PROCEDURE — 80048 BASIC METABOLIC PNL TOTAL CA: CPT

## 2022-01-14 PROCEDURE — 96375 TX/PRO/DX INJ NEW DRUG ADDON: CPT

## 2022-01-14 PROCEDURE — 36415 COLL VENOUS BLD VENIPUNCTURE: CPT

## 2022-01-14 PROCEDURE — 74011250636 HC RX REV CODE- 250/636: Performed by: FAMILY MEDICINE

## 2022-01-14 PROCEDURE — 74011250637 HC RX REV CODE- 250/637: Performed by: NURSE PRACTITIONER

## 2022-01-14 PROCEDURE — 86580 TB INTRADERMAL TEST: CPT | Performed by: HOSPITALIST

## 2022-01-14 PROCEDURE — 87040 BLOOD CULTURE FOR BACTERIA: CPT

## 2022-01-14 PROCEDURE — 74011250636 HC RX REV CODE- 250/636: Performed by: EMERGENCY MEDICINE

## 2022-01-14 PROCEDURE — 85025 COMPLETE CBC W/AUTO DIFF WBC: CPT

## 2022-01-14 PROCEDURE — 96366 THER/PROPH/DIAG IV INF ADDON: CPT

## 2022-01-14 PROCEDURE — 74011000250 HC RX REV CODE- 250: Performed by: EMERGENCY MEDICINE

## 2022-01-14 PROCEDURE — 74011000258 HC RX REV CODE- 258: Performed by: FAMILY MEDICINE

## 2022-01-14 PROCEDURE — 74011250637 HC RX REV CODE- 250/637: Performed by: FAMILY MEDICINE

## 2022-01-14 PROCEDURE — 74011000250 HC RX REV CODE- 250: Performed by: FAMILY MEDICINE

## 2022-01-14 RX ORDER — LORAZEPAM 2 MG/ML
1 INJECTION INTRAMUSCULAR
Status: DISCONTINUED | OUTPATIENT
Start: 2022-01-14 | End: 2022-01-14

## 2022-01-14 RX ORDER — CLONIDINE HYDROCHLORIDE 0.2 MG/1
0.2 TABLET ORAL 2 TIMES DAILY
Status: DISCONTINUED | OUTPATIENT
Start: 2022-01-14 | End: 2022-01-15

## 2022-01-14 RX ORDER — QUETIAPINE FUMARATE 100 MG/1
50 TABLET, FILM COATED ORAL 2 TIMES DAILY
Status: DISCONTINUED | OUTPATIENT
Start: 2022-01-14 | End: 2022-01-14

## 2022-01-14 RX ORDER — QUETIAPINE FUMARATE 25 MG/1
50 TABLET, FILM COATED ORAL
Status: DISCONTINUED | OUTPATIENT
Start: 2022-01-14 | End: 2022-01-14

## 2022-01-14 RX ORDER — LORAZEPAM 2 MG/ML
2 INJECTION INTRAMUSCULAR
Status: COMPLETED | OUTPATIENT
Start: 2022-01-14 | End: 2022-01-14

## 2022-01-14 RX ORDER — RASAGILINE 1 MG/1
1 TABLET ORAL DAILY
Status: DISCONTINUED | OUTPATIENT
Start: 2022-01-15 | End: 2022-01-15

## 2022-01-14 RX ORDER — QUETIAPINE FUMARATE 100 MG/1
100 TABLET, FILM COATED ORAL
Status: DISCONTINUED | OUTPATIENT
Start: 2022-01-14 | End: 2022-01-14

## 2022-01-14 RX ORDER — QUETIAPINE FUMARATE 25 MG/1
50 TABLET, FILM COATED ORAL 2 TIMES DAILY
Status: DISCONTINUED | OUTPATIENT
Start: 2022-01-14 | End: 2022-01-26 | Stop reason: HOSPADM

## 2022-01-14 RX ADMIN — SODIUM CHLORIDE, PRESERVATIVE FREE 10 ML: 5 INJECTION INTRAVENOUS at 21:14

## 2022-01-14 RX ADMIN — SODIUM CHLORIDE, PRESERVATIVE FREE 10 ML: 5 INJECTION INTRAVENOUS at 17:28

## 2022-01-14 RX ADMIN — LORAZEPAM 2 MG: 2 INJECTION INTRAMUSCULAR; INTRAVENOUS at 02:29

## 2022-01-14 RX ADMIN — QUETIAPINE FUMARATE 50 MG: 25 TABLET ORAL at 13:02

## 2022-01-14 RX ADMIN — CARBIDOPA AND LEVODOPA 1.5 TABLET: 25; 100 TABLET ORAL at 13:02

## 2022-01-14 RX ADMIN — CARBIDOPA AND LEVODOPA 1.5 TABLET: 25; 100 TABLET ORAL at 17:27

## 2022-01-14 RX ADMIN — PIPERACILLIN SODIUM AND TAZOBACTAM SODIUM 3.38 G: 3; .375 INJECTION, POWDER, LYOPHILIZED, FOR SOLUTION INTRAVENOUS at 21:15

## 2022-01-14 RX ADMIN — PIPERACILLIN SODIUM AND TAZOBACTAM SODIUM 3.38 G: 3; .375 INJECTION, POWDER, LYOPHILIZED, FOR SOLUTION INTRAVENOUS at 06:57

## 2022-01-14 RX ADMIN — SODIUM CHLORIDE, PRESERVATIVE FREE 5 ML: 5 INJECTION INTRAVENOUS at 07:00

## 2022-01-14 RX ADMIN — PIPERACILLIN SODIUM AND TAZOBACTAM SODIUM 3.38 G: 3; .375 INJECTION, POWDER, LYOPHILIZED, FOR SOLUTION INTRAVENOUS at 17:27

## 2022-01-14 RX ADMIN — CARBIDOPA AND LEVODOPA 1.5 TABLET: 25; 100 TABLET ORAL at 21:11

## 2022-01-14 RX ADMIN — CLONIDINE HYDROCHLORIDE 0.2 MG: 0.2 TABLET ORAL at 17:27

## 2022-01-14 RX ADMIN — ASPIRIN 81 MG: 81 TABLET ORAL at 13:03

## 2022-01-14 RX ADMIN — CARBIDOPA AND LEVODOPA 2 TABLET: 25; 100 TABLET, EXTENDED RELEASE ORAL at 21:49

## 2022-01-14 RX ADMIN — QUETIAPINE FUMARATE 50 MG: 100 TABLET ORAL at 21:11

## 2022-01-14 RX ADMIN — Medication 5 UNITS: at 12:46

## 2022-01-14 NOTE — H&P
Hospitalist Admission History and Physical     NAME:  Fredy Nicole   Age:  82 y.o.  :   1935   MRN:   [id number]  PCP: Deepa Wright MD  Consulting MD:  Treatment Team: Attending Provider: Gates Moritz, DO; Respiratory Therapist: Arpan Adams RT; Primary Nurse: Rose Hernandez RN    Chief Complaint   Patient presents with    Altered mental status         HPI:   Patient is a 82 y.o. male who presented to the ED for cc AMS since yesterday. Hx of prostate cancer, CAD s/p CABG, HTN, HLD, parkinsons disease with associated psychosis.      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 Lorenzo King)     Cognitive changes 2018    Colon polyp 2016    Overview:   (f/u in )     Diplopia 2016    (Dr. Tuan Garcia and Dr. Jimenez Chen)     Diverticulosis     Diverticulosis     Elevated prostate specific antigen (PSA) 2013    History of BPH     HTN (hypertension) 2013    Hypercholesteraemia     Hypercholesteremia 2013    Hyperlipidemia 2016    Hypertension     contrplled by medications    Lacunar infarct, acute ([address] 2016    lacunar infarct with 6 CN palsy (post CABG)- Dr. Rizwana traore     Malignant neoplasm of prostate ([facility] 2010    Overview:  (Dr. Telly Hardy)     Nodular prostate without urinary obstruction 2013    Parkinson's disease ([facility] [facility] 2016    followed by Dr. Liana Ewing Pneumonia     Prostate cancer [facility])     Pulmonary HTN ([facility] [address] [facility]     Sebaceous cyst 2013    Skin cancer     Slow transit constipation 2018    Stroke ([address]     tia that affected eye    Thromboembolus ([facility] [address]     pulmonary embolus    Unspecified adverse effect of anesthesia     pt states that he has HX of being sensative  to some anesthesias    Vitamin D deficiency         Past Surgical History:   Procedure Laterality Date    COLONOSCOPY N/A 5/3/2019    COLONOSCOPY/  performed by Jean Martin MD at [facility] HX CATARACT REMOVAL Right 2014    HX CATARACT REMOVAL Left     HX COLONOSCOPY  2013    (Dr. Annalee Bartlett)    HX CORONARY ARTERY BYPASS GRAFT  2011    CABG x7 (Dr. Izzy Rosales)    HX CYST REMOVAL  2012    cyst excision    HX HEENT      pt had a hot needle to soft palate to help snoring    HX OTHER SURGICAL Right     retina sx     HX OTHER SURGICAL      Bilateral Vats Ablation    HX PROSTATECTOMY      ROBOTIC    HX UROLOGICAL  ? circumcision    HX UROLOGICAL  2017    PROSTATE BIOPSY    OH CARDIAC SURG PROCEDURE UNLIST      cabg         Family History   Problem Relation Age of Onset    Lung Disease Mother         copd    Cancer Sister     Cancer Sister     Hypertension Other         GEN FAM HX        Social History     Social History Narrative    Not on file        Social History     Tobacco Use    Smoking status: Former Smoker     Packs/day: 1.00     Years: 22.00     Pack years: 22.00     Quit date:      Years since quittin.0    Smokeless tobacco: Never Used    Tobacco comment:    Substance Use Topics    Alcohol use: Yes     Alcohol/week: 0.8 standard drinks     Types: 1 Glasses of wine per week     Comment: daily at dinner        Social History     Substance and Sexual Activity   Drug Use No         Allergies   Allergen Reactions    Adhesive Tape Rash       Prior to Admission medications    Medication Sig Start Date End Date Taking?  Authorizing Provider   donepeziL (ARICEPT) 10 mg tablet TAKE 1 TABLET BY MOUTH EVERY DAY 22   Tuan Lorenzo MD   OTHER PT for postural imbalance and posture  SPeech therapy for hypophonia 22   Tuan Lorenzo MD   pimavanserin (Nuplazid) 34 mg cap Take 1 Capsule by mouth daily. 1/4/22   Alison Jules MD   QUEtiapine (SEROquel) 25 mg tablet Take 1 tablet in the afternoon and 2 tablets in the evening. Patient taking differently: 50 mg. Take 1 tablet in the afternoon and 2 tablets in the evening. 12/29/21   Enedina Russell MD   carbidopa-levodopa ER (SINEMET CR)  mg per tablet TAKE 2 TABLETs BY MOUTH NIGHTLY  Patient taking differently: 1 Tablet. TAKE 2 TABLETs BY MOUTH NIGHTLY 9/28/21   Alison Jules MD   carbidopa-levodopa (SINEMET)  mg per tablet TAKE 1 & 1/2 (ONE & ONE-HALF) TABLETS BY MOUTH 4 TIMES DAILY AT [place], [place] AND 8PM. 4/29/21   Alison Jules MD   rasagiline (AZILECT) 1 mg tablet TAKE 1 TABLET BY MOUTH ONCE DAILY 4/26/21   Alison Jules MD   cloNIDine HCL (CATAPRES) 0.1 mg tablet Take 0.1 mg by mouth two (2) times a day. Provider, Historical   cholecalciferol (VITAMIN D3) 1,000 unit cap Take  by mouth daily. Provider, Historical   atorvastatin (LIPITOR) 80 mg tablet Take 80 mg by mouth nightly. Provider, Historical   Aspirin, Buffered 81 mg tab Take  by mouth. Provider, Historical   nitroglycerin (NITROSTAT) 0.4 mg SL tablet by SubLINGual route every five (5) minutes as needed for Chest Pain. Provider, Historical           Review of Systems    Cannot obtain due to confusion      Objective:     Patient Vitals for the past 24 hrs:   Temp Pulse Resp BP SpO2   01/13/22 2000 -- 83 26 (!) 168/95 95 %   01/13/22 1919 -- 76 15 (!) 200/97 98 %   01/13/22 1900 -- 76 16 (!) 205/93 97 %   01/13/22 1854 98.4 °F (36.9 °C) 93 19 (!) 199/99 98 %   01/13/22 1823 -- 86 16 (!) 196/113 99 %   01/13/22 1808 -- 88 24 (!) 208/100 94 %   01/13/22 1745 -- 76 17 (!) 225/96 --   01/13/22 1738 -- 82 18 (!) 162/109 --   01/13/22 1737 -- 85 18 (!) 162/109 98 %   01/13/22 1714 96.9 °F (36.1 °C) 76 14 (!) 221/108 99 %        No intake/output data recorded. No intake/output data recorded.     Data Review: Recent Results (from the past 24 hour(s))   LACTIC ACID    Collection Time: 01/13/22  5:32 PM   Result Value Ref Range    Lactic acid 1.2 0.4 - 2.0 MMOL/L   CBC WITH AUTOMATED DIFF    Collection Time: 01/13/22  5:32 PM   Result Value Ref Range    WBC 7.0 4.3 - 11.1 K/uL    RBC 4.69 4.23 - 5.6 M/uL    HGB 14.7 13.6 - 17.2 g/dL    HCT 46.1 41.1 - 50.3 %    MCV 98.3 (H) 79.6 - 97.8 FL    MCH 31.3 26.1 - 32.9 PG    MCHC 31.9 31.4 - 35.0 g/dL    RDW 12.5 11.9 - 14.6 %    PLATELET 759 037 - 621 K/uL    MPV 11.6 9.4 - 12.3 FL    ABSOLUTE NRBC 0.00 0.0 - 0.2 K/uL    DF AUTOMATED      NEUTROPHILS 63 43 - 78 %    LYMPHOCYTES 24 13 - 44 %    MONOCYTES 10 4.0 - 12.0 %    EOSINOPHILS 2 0.5 - 7.8 %    BASOPHILS 1 0.0 - 2.0 %    IMMATURE GRANULOCYTES 0 0.0 - 5.0 %    ABS. NEUTROPHILS 4.4 1.7 - 8.2 K/UL    ABS. LYMPHOCYTES 1.7 0.5 - 4.6 K/UL    ABS. MONOCYTES 0.7 0.1 - 1.3 K/UL    ABS. EOSINOPHILS 0.2 0.0 - 0.8 K/UL    ABS. BASOPHILS 0.1 0.0 - 0.2 K/UL    ABS. IMM. GRANS. 0.0 0.0 - 0.5 K/UL   METABOLIC PANEL, COMPREHENSIVE    Collection Time: 01/13/22  5:32 PM   Result Value Ref Range    Sodium 145 138 - 145 mmol/L    Potassium 4.5 3.5 - 5.1 mmol/L    Chloride 110 (H) 98 - 107 mmol/L    CO2 28 21 - 32 mmol/L    Anion gap 7 7 - 16 mmol/L    Glucose 94 65 - 100 mg/dL    BUN 42 (H) 8 - 23 MG/DL    Creatinine 1.46 0.8 - 1.5 MG/DL    GFR est AA 59 (L) >60 ml/min/1.73m2    GFR est non-AA 49 (L) >60 ml/min/1.73m2    Calcium 10.1 8.3 - 10.4 MG/DL    Bilirubin, total 0.7 0.2 - 1.1 MG/DL    ALT (SGPT) 17 12 - 65 U/L    AST (SGOT) 31 15 - 37 U/L    Alk.  phosphatase 117 50 - 136 U/L    Protein, total 8.6 (H) 6.3 - 8.2 g/dL    Albumin 4.3 3.2 - 4.6 g/dL    Globulin 4.3 (H) 2.3 - 3.5 g/dL    A-G Ratio 1.0 (L) 1.2 - 3.5     PROCALCITONIN    Collection Time: 01/13/22  5:32 PM   Result Value Ref Range    Procalcitonin <0.05 0.00 - 0.49 ng/mL   COVID-19 RAPID TEST    Collection Time: 01/13/22  5:32 PM   Result Value Ref Range    Specimen source NASAL      COVID-19 rapid test Not detected NOTD     EKG, 12 LEAD, INITIAL    Collection Time: 01/13/22  5:39 PM   Result Value Ref Range    Ventricular Rate 83 BPM    Atrial Rate 83 BPM    P-R Interval 162 ms    QRS Duration 159 ms    Q-T Interval 419 ms    QTC Calculation (Bezet) 493 ms    Calculated P Axis 73 degrees    Calculated R Axis -11 degrees    Calculated T Axis 17 degrees    Diagnosis       Sinus rhythm  Atrial premature complex  Right bundle branch block  Inferior infarct, age indeterminate  Anterolateral infarct, age indeterminate  Baseline wander in lead(s) V4     URINALYSIS W/ RFLX MICROSCOPIC    Collection Time: 01/13/22  5:44 PM   Result Value Ref Range    Color YELLOW      Appearance CLEAR      Specific gravity 1.022 1.001 - 1.023      pH (UA) 5.5 5.0 - 9.0      Protein TRACE (A) NEG mg/dL    Glucose Negative mg/dL    Ketone TRACE (A) NEG mg/dL    Bilirubin Negative NEG      Blood Negative NEG      Urobilinogen 1.0 0.2 - 1.0 EU/dL    Nitrites Negative NEG      Leukocyte Esterase Negative NEG      WBC 0-3 0 /hpf    RBC 0-3 0 /hpf    Epithelial cells 0-3 0 /hpf    Bacteria 0 0 /hpf    Casts 10-20 0 /lpf    Mucus 1+ (H) 0 /lpf       Physical Exam:     General:  Alert, cooperative, irritable, significant tremors. Eyes:  Conjunctivae/corneas clear. Ears:  Normal TMs and external ear canals both ears. Nose: Nares normal.    Mouth/Throat: Lips, mucosa, and tongue normal. Teeth and gums normal.   Neck: no JVD. Back:   deferred   Lungs:   Clear to auscultation bilaterally. Heart:  Regular rate and rhythm, S1, S2 normal   Abdomen:   Soft, non-tender. Bowel sounds normal. No masses,  No organomegaly. Extremities: Ecchymosis to left dorsal hand, limited ROM   Pulses: 2+ and symmetric all extremities. Skin: As above    Lymph nodes: Cervical, supraclavicular, and axillary nodes normal.   Neurologic: Confused.  Can tell me where he is located and president but then starts speaking paranoid and stating there is a woman who brought you here     Assessment and Plan     Principal Problem:    Metabolic encephalopathy (8/17/4317)    Active Problems:    Malignant neoplasm of prostate ([facility] (9/13/2010)      Overview: Overview:       (Dr. Scar Lindsey)      HTN (hypertension) (11/27/2013)      Hyperlipidemia (7/26/2016)      Parkinson's disease ([facility] [address] (7/26/2016)      Overview: Overview:       (Dr. Pilo Leos)            Last Assessment & Plan:       States that he received a letter that his neurologist, Dr. Nannette Abraham has       moved. Will arrange f/u appt with a different neurologist.        Parkinson disease ([facility] (8/24/2016)      Tremor (8/24/2016)      Bacterial pneumonia (1/13/2022)    AMS- unsure exact etiology. Rapid covid negative but we will send for viral panel since abnormal chest x ray. Possible pneumonia on chest x ray so will treat for CAP. Give Zosyn (due to prolonged QTC). Could also be induced by his parkinson. Sees [facility] and just had medications changed on 1/4/22. Will consult neuro to see in AM. Also check ammonia and TSH/T4    Prolonged QTC - Place on cardiac monitor. Parkinson - Holding donepezil due to prolonged QTC. Nuplazid, seroquel, sinemet, rasagiline. HTN- Clinidine. HLD - statin    Place as observation. I have tried to call the wife with no answer.  FULL CODE for now until can readdress with family     DVT prophylaxis - SCDs    Signed By: Kevin Moreno DO   January 13, 2022

## 2022-01-14 NOTE — ED NOTES
TRANSFER - OUT REPORT:    Verbal report given to 6th floor RN (name) on Lisa Dumont  being transferred to 6th floor (unit) for routine progression of care       Report consisted of patients Situation, Background, Assessment and   Recommendations(SBAR). Information from the following report(s) SBAR, Kardex and ED Summary was reviewed with the receiving nurse. Lines:   Peripheral IV 01/13/22 Right Forearm (Active)   Site Assessment Clean, dry, & intact 01/13/22 1726   Phlebitis Assessment 0 01/13/22 1726   Infiltration Assessment 0 01/13/22 1726   Dressing Status Clean, dry, & intact 01/13/22 1726   Dressing Type 4 X 4 01/13/22 1726       Peripheral IV 01/13/22 Right;Upper Arm (Active)   Site Assessment Clean, dry, & intact 01/13/22 1743   Phlebitis Assessment 0 01/13/22 1743   Infiltration Assessment 0 01/13/22 1743   Dressing Status Clean, dry, & intact 01/13/22 1743        Opportunity for questions and clarification was provided.       Patient transported with:   [employer]

## 2022-01-14 NOTE — PROGRESS NOTES
completed initial visit with patient. Patent had difficulty speaking, but was able to communicate understanding and requested prayer.  provided pastoral presence, prayer and empathetic listening.   Signed by  Jane Mustafa M.Div.

## 2022-01-14 NOTE — PROGRESS NOTES
Hospitalist      NAME:  Nelli Hilton   Age:  82 y.o.  :   1935   MRN:   [id number]  PCP: Sandy Campos MD  Consulting MD:  Treatment Team: Attending Provider: Marcianne Mohs, DO; Consulting Provider: Sergei Newberry MD; Hospitalist: Kady Tobin MD      HPI:   Patient is a 82 y.o. male who presented to the ED for cc AMS since yesterday. Hx of prostate cancer, CAD s/p CABG, HTN, HLD, parkinsons disease with associated psychosis. Vitlas - Stable but BP was elevated on arrival    Today, no ac events, on room air, confused    Objective:       Data Review:   Recent Results (from the past 24 hour(s))   CULTURE, BLOOD    Collection Time: 22  5:32 PM    Specimen: Blood   Result Value Ref Range    Special Requests: FOREARM      Culture result: NO GROWTH AFTER 13 HOURS     LACTIC ACID    Collection Time: 22  5:32 PM   Result Value Ref Range    Lactic acid 1.2 0.4 - 2.0 MMOL/L   CBC WITH AUTOMATED DIFF    Collection Time: 22  5:32 PM   Result Value Ref Range    WBC 7.0 4.3 - 11.1 K/uL    RBC 4.69 4.23 - 5.6 M/uL    HGB 14.7 13.6 - 17.2 g/dL    HCT 46.1 41.1 - 50.3 %    MCV 98.3 (H) 79.6 - 97.8 FL    MCH 31.3 26.1 - 32.9 PG    MCHC 31.9 31.4 - 35.0 g/dL    RDW 12.5 11.9 - 14.6 %    PLATELET 166 538 - 528 K/uL    MPV 11.6 9.4 - 12.3 FL    ABSOLUTE NRBC 0.00 0.0 - 0.2 K/uL    DF AUTOMATED      NEUTROPHILS 63 43 - 78 %    LYMPHOCYTES 24 13 - 44 %    MONOCYTES 10 4.0 - 12.0 %    EOSINOPHILS 2 0.5 - 7.8 %    BASOPHILS 1 0.0 - 2.0 %    IMMATURE GRANULOCYTES 0 0.0 - 5.0 %    ABS. NEUTROPHILS 4.4 1.7 - 8.2 K/UL    ABS. LYMPHOCYTES 1.7 0.5 - 4.6 K/UL    ABS. MONOCYTES 0.7 0.1 - 1.3 K/UL    ABS. EOSINOPHILS 0.2 0.0 - 0.8 K/UL    ABS. BASOPHILS 0.1 0.0 - 0.2 K/UL    ABS. IMM.  GRANS. 0.0 0.0 - 0.5 K/UL   METABOLIC PANEL, COMPREHENSIVE    Collection Time: 22  5:32 PM   Result Value Ref Range    Sodium 145 138 - 145 mmol/L    Potassium 4.5 3.5 - 5.1 mmol/L    Chloride 110 (H) 98 - 107 mmol/L CO2 28 21 - 32 mmol/L    Anion gap 7 7 - 16 mmol/L    Glucose 94 65 - 100 mg/dL    BUN 42 (H) 8 - 23 MG/DL    Creatinine 1.46 0.8 - 1.5 MG/DL    GFR est AA 59 (L) >60 ml/min/1.73m2    GFR est non-AA 49 (L) >60 ml/min/1.73m2    Calcium 10.1 8.3 - 10.4 MG/DL    Bilirubin, total 0.7 0.2 - 1.1 MG/DL    ALT (SGPT) 17 12 - 65 U/L    AST (SGOT) 31 15 - 37 U/L    Alk.  phosphatase 117 50 - 136 U/L    Protein, total 8.6 (H) 6.3 - 8.2 g/dL    Albumin 4.3 3.2 - 4.6 g/dL    Globulin 4.3 (H) 2.3 - 3.5 g/dL    A-G Ratio 1.0 (L) 1.2 - 3.5     PROCALCITONIN    Collection Time: 01/13/22  5:32 PM   Result Value Ref Range    Procalcitonin <0.05 0.00 - 0.49 ng/mL   COVID-19 RAPID TEST    Collection Time: 01/13/22  5:32 PM   Result Value Ref Range    Specimen source NASAL      COVID-19 rapid test Not detected NOTD     TSH 3RD GENERATION    Collection Time: 01/13/22  5:32 PM   Result Value Ref Range    TSH 1.400 0.358 - 3.740 uIU/mL   T4, FREE    Collection Time: 01/13/22  5:32 PM   Result Value Ref Range    T4, Free 1.0 0.78 - 1.46 NG/DL   EKG, 12 LEAD, INITIAL    Collection Time: 01/13/22  5:39 PM   Result Value Ref Range    Ventricular Rate 83 BPM    Atrial Rate 83 BPM    P-R Interval 162 ms    QRS Duration 159 ms    Q-T Interval 419 ms    QTC Calculation (Bezet) 493 ms    Calculated P Axis 73 degrees    Calculated R Axis -11 degrees    Calculated T Axis 17 degrees    Diagnosis       Sinus rhythm  Atrial premature complex  Right bundle branch block  Inferior infarct, age indeterminate  Anterolateral infarct, age indeterminate    Confirmed by Lucius Kong MD ([id number]) on 1/14/2022 12:53:36 PM     URINALYSIS W/ RFLX MICROSCOPIC    Collection Time: 01/13/22  5:44 PM   Result Value Ref Range    Color YELLOW      Appearance CLEAR      Specific gravity 1.022 1.001 - 1.023      pH (UA) 5.5 5.0 - 9.0      Protein TRACE (A) NEG mg/dL    Glucose Negative mg/dL    Ketone TRACE (A) NEG mg/dL    Bilirubin Negative NEG      Blood Negative NEG Urobilinogen 1.0 0.2 - 1.0 EU/dL    Nitrites Negative NEG      Leukocyte Esterase Negative NEG      WBC 0-3 0 /hpf    RBC 0-3 0 /hpf    Epithelial cells 0-3 0 /hpf    Bacteria 0 0 /hpf    Casts 10-20 0 /lpf    Mucus 1+ (H) 0 /lpf   AMMONIA    Collection Time: 01/13/22  9:00 PM   Result Value Ref Range    Ammonia 11 11 - 32 UMOL/L   RESPIRATORY VIRUS PANEL W/COVID-19, PCR    Collection Time: 01/13/22  9:00 PM    Specimen: Nasopharyngeal   Result Value Ref Range    Adenovirus NOT DETECTED NOTDET      Coronavirus 229E NOT DETECTED NOTDET      Coronavirus HKU1 NOT DETECTED NOTDET      Coronavirus CVNL63 NOT DETECTED NOTDET      Coronavirus OC43 NOT DETECTED NOTDET      SARS-CoV-2, PCR NOT DETECTED NOTDET      Metapneumovirus NOT DETECTED NOTDET      Rhinovirus and Enterovirus NOT DETECTED NOTDET      Influenza A NOT DETECTED NOTDET      Influenza B NOT DETECTED NOTDET      Parainfluenza 1 NOT DETECTED NOTDET      Parainfluenza 2 NOT DETECTED NOTDET      Parainfluenza 3 NOT DETECTED NOTDET      Parainfluenza virus 4 NOT DETECTED NOTDET      RSV by PCR NOT DETECTED NOTDET      B. parapertussis, PCR NOT DETECTED NOTDET      Bordetella pertussis - PCR NOT DETECTED NOTDET      Chlamydophila pneumoniae DNA, QL, PCR NOT DETECTED NOTDET      Mycoplasma pneumoniae DNA, QL, PCR NOT DETECTED NOTDET     CBC WITH AUTOMATED DIFF    Collection Time: 01/14/22  5:32 AM   Result Value Ref Range    WBC 7.2 4.3 - 11.1 K/uL    RBC 4.45 4.23 - 5.6 M/uL    HGB 13.5 (L) 13.6 - 17.2 g/dL    HCT 42.6 41.1 - 50.3 %    MCV 95.7 79.6 - 97.8 FL    MCH 30.3 26.1 - 32.9 PG    MCHC 31.7 31.4 - 35.0 g/dL    RDW 12.4 11.9 - 14.6 %    PLATELET 451 382 - 487 K/uL    MPV 11.4 9.4 - 12.3 FL    ABSOLUTE NRBC 0.00 0.0 - 0.2 K/uL    NEUTROPHILS 67 43 - 78 %    LYMPHOCYTES 20 13 - 44 %    MONOCYTES 8 4.0 - 12.0 %    EOSINOPHILS 3 0.5 - 7.8 %    BASOPHILS 1 0.0 - 2.0 %    IMMATURE GRANULOCYTES 1 0.0 - 5.0 %    ABS. NEUTROPHILS 4.8 1.7 - 8.2 K/UL    ABS. LYMPHOCYTES 1.4 0.5 - 4.6 K/UL    ABS. MONOCYTES 0.6 0.1 - 1.3 K/UL    ABS. EOSINOPHILS 0.2 0.0 - 0.8 K/UL    ABS. BASOPHILS 0.1 0.0 - 0.2 K/UL    ABS. IMM. GRANS. 0.1 0.0 - 0.5 K/UL    DF AUTOMATED     METABOLIC PANEL, BASIC    Collection Time: 01/14/22  5:32 AM   Result Value Ref Range    Sodium 146 (H) 138 - 145 mmol/L    Potassium 4.2 3.5 - 5.1 mmol/L    Chloride 112 (H) 98 - 107 mmol/L    CO2 27 21 - 32 mmol/L    Anion gap 7 7 - 16 mmol/L    Glucose 89 65 - 100 mg/dL    BUN 33 (H) 8 - 23 MG/DL    Creatinine 1.12 0.8 - 1.5 MG/DL    GFR est AA >60 >60 ml/min/1.73m2    GFR est non-AA >60 >60 ml/min/1.73m2    Calcium 9.5 8.3 - 10.4 MG/DL       Physical Exam:     General:  Alert, disoriented, moderate distress, pale   Lungs:   Clear to auscultation bilaterally. Heart:  Regular rate and rhythm, S1, S2 normal   Abdomen:   Soft, non-tender. Bowel sounds normal. No masses,  No organomegaly. Extremities: Ecchymosis to left dorsal hand, limited ROM   Neurologic: . Perrla, moves 4 limbs     Assessment and Plan     1- Acute encephalopathy, per admission, hx of Parkinson's disease. 2- Possible community acquired pneumonia, bilateral, covid-19 screen negative, on room air, minimal symptoms if any  3- Hypernatremia, hypovolemic, mild  4- QT interval prolongation noted  5- HTN, not well controlled  6- Hx of BPH, prostate Ca and CABG. Negative UA.  Stable    Rx:  Continue current IV abx  AM lab  IVF hydration  BP control    Dispo: TBD, PT/OT consulted    Signed By: Everette Wahl MD   January 14, 2022

## 2022-01-14 NOTE — PROGRESS NOTES
TRANSFER - IN REPORT:    Verbal report received from KATIE Sampson (name) on Domingo Israel  being received from ED (unit) for routine progression of care      Report consisted of patients Situation, Background, Assessment and   Recommendations(SBAR). Information from the following report(s) SBAR, ED Summary, Intake/Output and MAR was reviewed with the receiving nurse. Opportunity for questions and clarification was provided. Assessment completed upon patients arrival to unit and care assumed.

## 2022-01-14 NOTE — PROGRESS NOTES
Care Management Interventions  PCP Verified by CM: Yes  Mode of Transport at Discharge: BLS  Transition of Care Consult (CM Consult): Discharge Planning,Assisted Living,SNF  Discharge Durable Medical Equipment: No  Physical Therapy Consult: No  Occupational Therapy Consult: No  Speech Therapy Consult: No  Support Systems: Spouse/Significant Other  Confirm Follow Up Transport: Family  The Plan for Transition of Care is Related to the Following Treatment Goals : SNF vs [facility]  Discharge Location  Discharge Placement: Skilled nursing facility        Pt awaiting bed assignment. Patient sleeping. CM unable to reach to spouse. Per chart review patient is a resident of [address] [place] [address], working on transitioning to [employer] [facility]. CM spoke with Manjinder Almonte at [facility], hoping to transition to SNF. Patient has been experiencing hallucinations for the last 6 months, progressive decline in the last month. PPD ordered. CM to continue to monitor.

## 2022-01-14 NOTE — PROGRESS NOTES
Neurology Daily Progress Note     Assessment:     82year old male with hx of PD with autonomic dysfunction who presented with delusions and hallucinations. He is known to neurology. Plan:     Continue Carbidopa-levodopa 25/100 mg  1.5 tablets every four times daily at 8am, 12pm, 4pm, 8pm.     Continue Carbidopa-levodopa CR 50/200mg 1 tab Qhs     Continue Seroquel 50 mg at noon and 50mg at qhs. Nuplazid 34mg Q day. This is not hospital formulary, family will need to bring medication from home. If medications are not on formulary, have family bring in medications. Do not stop medications. Abrupt discontinuation/reduction of dopaminergic medications can be life threatening. If unable to swallow, give medications via NG tube. Avoid Geodon/Haldol    Continue to correct metabolic abnormalities. Management of Delirium     Non-pharmacological intervention  · Reorient the patient throughout the day  · Window open and lights on during the day. Lights off, television off, noises down during the night. If able, decrease nursing checks at night  · Therapies as often as possible  · Avoid restraints to the best of your ability   · Avoid sensory deprivation by using glasses and hearing aids, if applicable           Subjective: Interval history:    Somnolent. Arouses to voice. Pleasantly confused. Moves all extremities to command. Ecchymosis noted to left hand. UA negative. History:    Vilma Page is a 82 y.o. male who is being seen for PD with psychosis. Review of systems negative with exception of pertinent positives and negatives noted above.        Objective:     Vitals:    01/14/22 0951 01/14/22 0955 01/14/22 1004 01/14/22 1039   BP:   (!) 151/118 (!) 218/91   Pulse: 75 76  66   Resp: 14 24  16   Temp:    98.2 °F (36.8 °C)   SpO2:    99%   Weight:    154 lb 5.2 oz (70 kg)   Height:              Current Facility-Administered Medications:     cloNIDine HCL (CATAPRES) tablet 0.2 mg, 0.2 mg, Oral, BID, Jordyn Mayes MD    QUEtiapine (SEROquel) tablet 50 mg, 50 mg, Oral, BID, Jordyn Mayes MD, 50 mg at 01/14/22 1302    tuberculin injection 5 Units, 5 Units, IntraDERMal, ONCE, Jordyn Mayes MD, 5 Units at 01/14/22 1246    influenza vaccine 2021-22 (6 mos+)(PF) (FLUARIX/FLULAVAL/FLUZONE QUAD) injection 0.5 mL, 1 Each, IntraMUSCular, PRIOR TO DISCHARGE, Jordyn Mayes MD  [facility]  Sumanth Sellers ON 1/15/2022] rasagiline (AZILECT) tablet 1 mg (Patient Supplied), 1 mg, Oral, DAILY, Debbi Brown,     sodium chloride (NS) flush 5-40 mL, 5-40 mL, IntraVENous, Q8H, Debbi Brown, , 5 mL at 01/14/22 0700    sodium chloride (NS) flush 5-40 mL, 5-40 mL, IntraVENous, PRN, Jessie Coolvincenzo, DO    acetaminophen (TYLENOL) tablet 650 mg, 650 mg, Oral, Q6H PRN **OR** acetaminophen (TYLENOL) suppository 650 mg, 650 mg, Rectal, Q6H PRN, Jessie Cooler, DO    polyethylene glycol (MIRALAX) packet 17 g, 17 g, Oral, DAILY PRN, Jessie Cooler, DO    ondansetron (ZOFRAN ODT) tablet 4 mg, 4 mg, Oral, Q8H PRN **OR** ondansetron (ZOFRAN) injection 4 mg, 4 mg, IntraVENous, Q6H PRN, Jessie Cruzer, DO    aspirin delayed-release tablet 81 mg, 81 mg, Oral, DAILY, Jessie Coolvincenzo, DO, 81 mg at 01/14/22 1303    carbidopa-levodopa ER (SINEMET CR)  mg per tablet 2 Tablet, 2 Tablet, Oral, QHS, Debbi Brown DO    pimavanserin cap 34 mg (Nuplazid) pt supplied (Patient Supplied), 34 mg, Oral, DAILY, Debbi Brown DO    carbidopa-levodopa (SINEMET)  mg per tablet 1.5 Tablet, 1.5 Tablet, Oral, QID, Jessie Dominguez, DO, 1.5 Tablet at 01/14/22 1302    piperacillin-tazobactam (ZOSYN) 3.375 g in 0.9% sodium chloride (MBP/ADV) 100 mL MBP, 3.375 g, IntraVENous, Q8H, Debbi Brown, DO, Last Rate: 25 mL/hr at 01/14/22 0657, 3.375 g at 01/14/22 5162    Recent Results (from the past 12 hour(s))   CBC WITH AUTOMATED DIFF    Collection Time: 01/14/22  5:32 AM   Result Value Ref Range    WBC 7.2 4.3 - 11.1 K/uL    RBC 4.45 4.23 - 5.6 M/uL    HGB 13.5 (L) 13.6 - 17.2 g/dL    HCT 42.6 41.1 - 50.3 %    MCV 95.7 79.6 - 97.8 FL    MCH 30.3 26.1 - 32.9 PG    MCHC 31.7 31.4 - 35.0 g/dL    RDW 12.4 11.9 - 14.6 %    PLATELET 042 382 - 229 K/uL    MPV 11.4 9.4 - 12.3 FL    ABSOLUTE NRBC 0.00 0.0 - 0.2 K/uL    NEUTROPHILS 67 43 - 78 %    LYMPHOCYTES 20 13 - 44 %    MONOCYTES 8 4.0 - 12.0 %    EOSINOPHILS 3 0.5 - 7.8 %    BASOPHILS 1 0.0 - 2.0 %    IMMATURE GRANULOCYTES 1 0.0 - 5.0 %    ABS. NEUTROPHILS 4.8 1.7 - 8.2 K/UL    ABS. LYMPHOCYTES 1.4 0.5 - 4.6 K/UL    ABS. MONOCYTES 0.6 0.1 - 1.3 K/UL    ABS. EOSINOPHILS 0.2 0.0 - 0.8 K/UL    ABS. BASOPHILS 0.1 0.0 - 0.2 K/UL    ABS. IMM. GRANS. 0.1 0.0 - 0.5 K/UL    DF AUTOMATED     METABOLIC PANEL, BASIC    Collection Time: 01/14/22  5:32 AM   Result Value Ref Range    Sodium 146 (H) 138 - 145 mmol/L    Potassium 4.2 3.5 - 5.1 mmol/L    Chloride 112 (H) 98 - 107 mmol/L    CO2 27 21 - 32 mmol/L    Anion gap 7 7 - 16 mmol/L    Glucose 89 65 - 100 mg/dL    BUN 33 (H) 8 - 23 MG/DL    Creatinine 1.12 0.8 - 1.5 MG/DL    GFR est AA >60 >60 ml/min/1.73m2    GFR est non-AA >60 >60 ml/min/1.73m2    Calcium 9.5 8.3 - 10.4 MG/DL       Physical Exam:  General - elderly , well nourished, in no apparent distress. Confused and disoriented  HEENT - Normocephalic, atraumatic. Conjunctiva are clear. Neck - Supple without masses  Cardiovascular - Regular rate and rhythm. Normal S1, S2 without murmurs, rubs, or gallops. Lungs - Clear to auscultation. Abdomen - Soft, nontender with normal bowel sounds. Extremities - Peripheral pulses intact. Ecchymosis to left hand. No edema and no rashes. Neurological examination - Oriented to self. Comprehension, attention, memory and reasoning are impaired. Language and speech are normal.   On cranial nerve examination, (II, III, IV, VI) pupils are equal, round, and reactive to light. Visual acuity is adequate.  Visual fields are full to finger confrontation. Extraocular motility is normal. (V, VII) Face is symmetric and sensation is intact to light touch. (VIII) Hearing is intact. (IX, X) Palate and uvula elevate symmetrically. Voice is hypophonic. (XI) Shoulder shrug is strong and equal bilaterally. (XII)Tongue is midline. Motor examination - There is increase muscle tone and bulk, cogwheeling. Power is full throughout. Muscle stretch reflexes are1+ throughout. Plantar response is flexor. Unable to assess sensation, cerebellar examination or gait/stance due to AMS. Signed By: MARCO ANTONIO Knutson     January 14, 2022    Neurology attending    Patient seen and examined  I have reviewed the extensive documentation in the chart over the last 5 or 6 years in terms of the evolution of the patient's Parkinson's disease and his management    Initial onset of disease in 2011 with fairly typical predominantly motor symptomatology with the development of REM associated behavioral disorder and subsequently issues with cognitive dysfunction considerably more recently. Typical issues with on-off phenomenology. At the present time the patient's neurologic examination clearly shows him to be experiencing off time and is a little unclear at the time of this evaluation which is at 4:20 PM time of dosage of his last medication we will review the situation further in the morning  I note that amantadine has been discontinued which is appropriate given his delusional state.

## 2022-01-15 PROBLEM — G93.40 ACUTE ENCEPHALOPATHY: Status: ACTIVE | Noted: 2022-01-15

## 2022-01-15 LAB
ANION GAP SERPL CALC-SCNC: 7 MMOL/L (ref 7–16)
ATRIAL RATE: 67 BPM
BUN SERPL-MCNC: 27 MG/DL (ref 8–23)
CALCIUM SERPL-MCNC: 9.2 MG/DL (ref 8.3–10.4)
CALCULATED P AXIS, ECG09: 45 DEGREES
CALCULATED R AXIS, ECG10: -23 DEGREES
CALCULATED T AXIS, ECG11: -15 DEGREES
CHLORIDE SERPL-SCNC: 108 MMOL/L (ref 98–107)
CO2 SERPL-SCNC: 27 MMOL/L (ref 21–32)
CREAT SERPL-MCNC: 1.18 MG/DL (ref 0.8–1.5)
DIAGNOSIS, 93000: NORMAL
GLUCOSE SERPL-MCNC: 91 MG/DL (ref 65–100)
MM INDURATION POC: 0 MM (ref 0–5)
P-R INTERVAL, ECG05: 156 MS
POTASSIUM SERPL-SCNC: 4.1 MMOL/L (ref 3.5–5.1)
PPD POC: NEGATIVE NEGATIVE
Q-T INTERVAL, ECG07: 468 MS
QRS DURATION, ECG06: 154 MS
QTC CALCULATION (BEZET), ECG08: 494 MS
SODIUM SERPL-SCNC: 142 MMOL/L (ref 138–145)
VENTRICULAR RATE, ECG03: 67 BPM

## 2022-01-15 PROCEDURE — 80048 BASIC METABOLIC PNL TOTAL CA: CPT

## 2022-01-15 PROCEDURE — 77030040393 HC DRSG OPTIFOAM GENT MDII -B

## 2022-01-15 PROCEDURE — 65270000029 HC RM PRIVATE

## 2022-01-15 PROCEDURE — 96376 TX/PRO/DX INJ SAME DRUG ADON: CPT

## 2022-01-15 PROCEDURE — 99232 SBSQ HOSP IP/OBS MODERATE 35: CPT | Performed by: PSYCHIATRY & NEUROLOGY

## 2022-01-15 PROCEDURE — 97530 THERAPEUTIC ACTIVITIES: CPT

## 2022-01-15 PROCEDURE — 74011250637 HC RX REV CODE- 250/637: Performed by: HOSPITALIST

## 2022-01-15 PROCEDURE — APPSS30 APP SPLIT SHARED TIME 16-30 MINUTES: Performed by: NURSE PRACTITIONER

## 2022-01-15 PROCEDURE — 65660000000 HC RM CCU STEPDOWN

## 2022-01-15 PROCEDURE — 97166 OT EVAL MOD COMPLEX 45 MIN: CPT

## 2022-01-15 PROCEDURE — 97535 SELF CARE MNGMENT TRAINING: CPT

## 2022-01-15 PROCEDURE — 97162 PT EVAL MOD COMPLEX 30 MIN: CPT

## 2022-01-15 PROCEDURE — 74011000258 HC RX REV CODE- 258: Performed by: FAMILY MEDICINE

## 2022-01-15 PROCEDURE — 74011000250 HC RX REV CODE- 250: Performed by: FAMILY MEDICINE

## 2022-01-15 PROCEDURE — 2709999900 HC NON-CHARGEABLE SUPPLY

## 2022-01-15 PROCEDURE — 36415 COLL VENOUS BLD VENIPUNCTURE: CPT

## 2022-01-15 PROCEDURE — 74011250636 HC RX REV CODE- 250/636: Performed by: FAMILY MEDICINE

## 2022-01-15 PROCEDURE — 74011250637 HC RX REV CODE- 250/637: Performed by: FAMILY MEDICINE

## 2022-01-15 PROCEDURE — 74011250637 HC RX REV CODE- 250/637: Performed by: NURSE PRACTITIONER

## 2022-01-15 PROCEDURE — 74011250636 HC RX REV CODE- 250/636: Performed by: HOSPITALIST

## 2022-01-15 PROCEDURE — G0378 HOSPITAL OBSERVATION PER HR: HCPCS

## 2022-01-15 PROCEDURE — 93005 ELECTROCARDIOGRAM TRACING: CPT | Performed by: FAMILY MEDICINE

## 2022-01-15 RX ORDER — ZONISAMIDE 25 MG/1
50 CAPSULE ORAL 2 TIMES DAILY
Status: DISCONTINUED | OUTPATIENT
Start: 2022-01-15 | End: 2022-01-15

## 2022-01-15 RX ORDER — ZONISAMIDE 25 MG/1
25 CAPSULE ORAL DAILY
Status: DISCONTINUED | OUTPATIENT
Start: 2022-01-16 | End: 2022-01-26 | Stop reason: HOSPADM

## 2022-01-15 RX ORDER — HYDRALAZINE HYDROCHLORIDE 25 MG/1
25 TABLET, FILM COATED ORAL
Status: DISCONTINUED | OUTPATIENT
Start: 2022-01-15 | End: 2022-01-15

## 2022-01-15 RX ORDER — CEFUROXIME AXETIL 250 MG/1
250 TABLET ORAL EVERY 12 HOURS
Status: DISCONTINUED | OUTPATIENT
Start: 2022-01-15 | End: 2022-01-18

## 2022-01-15 RX ORDER — CLONIDINE HYDROCHLORIDE 0.1 MG/1
0.1 TABLET ORAL
Status: DISCONTINUED | OUTPATIENT
Start: 2022-01-15 | End: 2022-01-20

## 2022-01-15 RX ORDER — DOXYCYCLINE 100 MG/1
100 CAPSULE ORAL EVERY 12 HOURS
Status: DISCONTINUED | OUTPATIENT
Start: 2022-01-15 | End: 2022-01-18

## 2022-01-15 RX ORDER — CLONIDINE HYDROCHLORIDE 0.2 MG/1
0.2 TABLET ORAL
Status: DISCONTINUED | OUTPATIENT
Start: 2022-01-15 | End: 2022-01-15

## 2022-01-15 RX ADMIN — CEFUROXIME AXETIL 250 MG: 250 TABLET ORAL at 13:03

## 2022-01-15 RX ADMIN — DOXYCYCLINE HYCLATE 100 MG: 100 CAPSULE ORAL at 13:03

## 2022-01-15 RX ADMIN — CEFUROXIME AXETIL 250 MG: 250 TABLET ORAL at 21:19

## 2022-01-15 RX ADMIN — ASPIRIN 81 MG: 81 TABLET ORAL at 08:06

## 2022-01-15 RX ADMIN — DOXYCYCLINE HYCLATE 100 MG: 100 CAPSULE ORAL at 21:19

## 2022-01-15 RX ADMIN — QUETIAPINE FUMARATE 50 MG: 100 TABLET ORAL at 11:00

## 2022-01-15 RX ADMIN — CARBIDOPA AND LEVODOPA 1.5 TABLET: 25; 100 TABLET ORAL at 08:06

## 2022-01-15 RX ADMIN — QUETIAPINE FUMARATE 50 MG: 100 TABLET ORAL at 21:19

## 2022-01-15 RX ADMIN — CLONIDINE HYDROCHLORIDE 0.2 MG: 0.2 TABLET ORAL at 08:06

## 2022-01-15 RX ADMIN — SODIUM CHLORIDE, PRESERVATIVE FREE 10 ML: 5 INJECTION INTRAVENOUS at 13:03

## 2022-01-15 RX ADMIN — SODIUM CHLORIDE, PRESERVATIVE FREE 10 ML: 5 INJECTION INTRAVENOUS at 05:34

## 2022-01-15 RX ADMIN — SODIUM CHLORIDE, PRESERVATIVE FREE 10 ML: 5 INJECTION INTRAVENOUS at 21:19

## 2022-01-15 RX ADMIN — CARBIDOPA AND LEVODOPA 2 TABLET: 25; 100 TABLET, EXTENDED RELEASE ORAL at 21:19

## 2022-01-15 RX ADMIN — PIPERACILLIN SODIUM AND TAZOBACTAM SODIUM 3.38 G: 3; .375 INJECTION, POWDER, LYOPHILIZED, FOR SOLUTION INTRAVENOUS at 05:30

## 2022-01-15 RX ADMIN — CARBIDOPA AND LEVODOPA 1.5 TABLET: 25; 100 TABLET ORAL at 15:19

## 2022-01-15 RX ADMIN — CARBIDOPA AND LEVODOPA 1.5 TABLET: 25; 100 TABLET ORAL at 11:00

## 2022-01-15 RX ADMIN — CARBIDOPA AND LEVODOPA 1.5 TABLET: 25; 100 TABLET ORAL at 20:10

## 2022-01-15 RX ADMIN — SODIUM CHLORIDE 500 ML: 9 INJECTION, SOLUTION INTRAVENOUS at 13:24

## 2022-01-15 NOTE — PROGRESS NOTES
ACUTE OT GOALS:  (Developed with and agreed upon by patient and/or caregiver.)  1. Pt will toilet with min a   2. Pt will complete functional mobility for ADLs with min A using AD as needed  3. Pt will complete lower body dressing with min A using AE as needed  4. Pt will complete grooming and hygiene with set up  5. Pt will demonstrate independence with HEP to promote increased BUE strength and functional use for ADLs  6. Pt will complete UB bathing/ dressing with min A       Timeframe: 7 days      OCCUPATIONAL THERAPY ASSESSMENT: Initial Assessment and Daily Note OT Treatment Day # 1    Hoa Kapadia is a 82 y.o. male   PRIMARY DIAGNOSIS: Metabolic encephalopathy  Metabolic encephalopathy [G64.21]  Bacterial pneumonia [J15.9]  [facility] [G20]       Reason for Referral:  Parkinson's, encephalopathy  ICD-10: Treatment Diagnosis: Dizziness and Giddiness (R42)  OBSERVATION[place] / [place] B / Product Type: Medicare /   ASSESSMENT:     REHAB RECOMMENDATIONS:   Recommendation to date pending progress:  Setting:   Short-term Rehab  Equipment:    None     PRIOR LEVEL OF FUNCTION:  (Prior to Hospitalization)  INITIAL/CURRENT LEVEL OF FUNCTION:  (Based on today's evaluation)   Bathing:   Independent  Dressing:   Independent  Feeding/Grooming:   Independent  Toileting:   Independent  Functional Mobility:   Modified Independent Bathing:   Moderate Assistance  Dressing:   Moderate Assistance  Feeding/Grooming:   Minimal Assistance  Toileting:   Moderate Assistance  Functional Mobility:   Minimal Assistance x 2     ASSESSMENT:  Mr. Milvia Abraham was admitted w/ psychosis and encephalopathy, has a hx of Parkinson's with associated psychosis. Pt presented generally weak with deficits in cognition, mobility, balance, and coordination impacting ADLs.  Pt very unsteady and tremulous with jerking and writhing movements noted during ADLs and mobility for ADLs, poor sitting and standing balance. Pt is at a high risk for falls d/t impaired balance and cognition. Pt required min X2 for mobility and balance, min A for grooming w/ use of B hands. Pt would benefit from skilled OT services to address deficits. SUBJECTIVE:   Mr. Shaggy Navarro states, \"okay\"    SOCIAL HISTORY/LIVING ENVIRONMENT: Poor historian, reports that he was independent and did not use an AD. unsure of accuracy of report.  Multiple falls  Home Environment: Patient room  One/Two Story Residence: One story  Living Alone: No  Support Systems: Spouse/Significant Other,Child(russell)    OBJECTIVE:     PAIN: VITAL SIGNS: LINES/DRAINS:   Pre Treatment: Pain Screen  Pain Scale 1: FLACC  Pain Intensity 1: 0  Post Treatment: 0   IV  O2 Device: None (Room air)     GROSS EVALUATION:  BUE Within Functional Limits Abnormal/ Functional Abnormal/ Non-Functional (see comments) Not Tested Comments:   AROM [] [x] [] []    PROM [] [] [] []    Strength [] [x] [] []    Balance [] [x] [x] []    Posture [] [] [] []    Sensation [] [] [] []    Coordination [] [x] [] []    Tone [] [] [] []    Edema [] [] [] []    Activity Tolerance [] [] [] []     [] [] [] []      COGNITION/  PERCEPTION: Intact Impaired   (see comments) Comments:   Orientation [] [x] To person only   Vision [] [x] Glasses w/ tape   Hearing [] []    Judgment/ Insight [] [x] Poor insight and safety awareness   Attention [] [x]    Memory [] [x]    Command Following [x] [x]    Emotional Regulation [] []     [] []      ACTIVITIES OF DAILY LIVING: I Mod I S SBA CGA Min Mod Max Total NT Comments   BASIC ADLs:              Bathing/ Showering [] [] [] [] [] [] [] [] [] []    Toileting [] [] [] [] [] [] [] [] [] []    Dressing [] [] [] [] [] [] [] [] [] []    Feeding [] [] [] [] [] [] [] [] [] []    Grooming [] [] [] [] [] [x] [] [] [] []    Personal Device Care [] [] [] [] [] [] [] [] [] []    Functional Mobility [] [] [] [] [] [x] [x] [] [] [] x2   I=Independent, Mod I=Modified Independent, S=Supervision, SBA=Standby Assistance, CGA=Contact Guard Assistance,   Min=Minimal Assistance, Mod=Moderate Assistance, Max=Maximal Assistance, Total=Total Assistance, NT=Not Tested    MOBILITY: I Mod I S SBA CGA Min Mod Max Total  NT x2 Comments:   Supine to sit [] [] [] [] [] [x] [] [] [] [] []    Sit to supine [] [] [] [] [] [] [] [] [] [] []    Sit to stand [] [] [] [] [] [x] [] [] [] [] [x]    Bed to chair [] [] [] [] [] [] [] [] [] [] []    I=Independent, Mod I=Modified Independent, S=Supervision, SBA=Standby Assistance, CGA=Contact Guard Assistance,   Min=Minimal Assistance, Mod=Moderate Assistance, Max=Maximal Assistance, Total=Total Assistance, NT=Not [facility] 6 Clicks   Daily Activity Inpatient Short Form        How much help from another person does the patient currently need. .. Total A Lot A Little None   1. Putting on and taking off regular lower body clothing? [] 1   [x] 2   [] 3   [] 4   2. Bathing (including washing, rinsing, drying)? [] 1   [x] 2   [] 3   [] 4   3. Toileting, which includes using toilet, bedpan or urinal?   [] 1   [x] 2   [] 3   [] 4   4. Putting on and taking off regular upper body clothing? [] 1   [] 2   [x] 3   [] 4   5. Taking care of personal grooming such as brushing teeth? [] 1   [] 2   [x] 3   [] 4   6. Eating meals? [] 1   [] 2   [x] 3   [] 4   © 2007, Trustees of [address] [place] [address] [address] [id number], under license to [employer]. All rights reserved     Score:  Initial: 15 Most Recent: X (Date: -- )   Interpretation of Tool:  Represents activities that are increasingly more difficult (i.e. Bed mobility, Transfers, Gait). PLAN:   FREQUENCY/DURATION: OT Plan of Care: 3 times/week for duration of hospital stay or until stated goals are met, whichever comes first.    PROBLEM LIST:   (Skilled intervention is medically necessary to address:)  1. Decreased ADL/Functional Activities  2. Decreased Activity Tolerance  3. Decreased Balance  4.  Decreased Cognition  5. Decreased Coordination  6. Decreased Strength  7. Decreased Transfer Abilities   INTERVENTIONS PLANNED:   (Benefits and precautions of occupational therapy have been discussed with the patient.)  1. Self Care Training  2. Therapeutic Activity  3. Therapeutic Exercise/HEP  4. Neuromuscular Re-education  5. Education     TREATMENT:     EVALUATION: Moderate Complexity : (Untimed Charge)    TREATMENT:   ($$ Self Care/Home Management: 8-22 mins    )  Co-Treatment PT/OT necessary due to patient's decreased overall endurance/tolerance levels, as well as need for high level skilled assistance to complete functional transfers/mobility and functional tasks  Self Care (8 Minutes): Self care including Grooming to increase independence and decrease level of assistance required.     TREATMENT GRID:  N/A    AFTER TREATMENT POSITION/PRECAUTIONS:  Alarm Activated, Bed, Needs within reach and RN notified    INTERDISCIPLINARY COLLABORATION:  RN/PCT and PT/PTA    TOTAL TREATMENT DURATION:  OT Patient Time In/Time Out  Time In: 2267  Time Out: 1 CisnerosCorrelecna Craw

## 2022-01-15 NOTE — PROGRESS NOTES
Pt is alert and confused, oriented to person only. Pt in bed, resting. Bed in low position, wheels locked, call light within reach, instructed to call with any needs. Hourly rounds completed this shift. NAD noted. Low BP noted this shift, hospitalist notified, see new orders. Pt has not c/o pain. IV is SL, and dressing is clean, dry, and intact. Report to be given to night shift nurse.

## 2022-01-15 NOTE — PROGRESS NOTES
01/14/22 1133   Dual Skin Pressure Injury Assessment   Dual Skin Pressure Injury Assessment X   Second Care Provider (Based on [facility]) Angy Mei RN   Buttocks/Ishium    (small red area, no open wound)

## 2022-01-15 NOTE — PROGRESS NOTES
Pt HR keeps dropping down to the 40's. DR Pamela Shields notified. EKG ordered. ED called to notify them of ekg being ordered.

## 2022-01-15 NOTE — PROGRESS NOTES
Patient and wife oriented to room. Patient alter with eyes closed. Denies any needs. Brief on patient as he can not use urinal.  Allevyn applied to sacrum to prevent breakdown.

## 2022-01-15 NOTE — PROGRESS NOTES
Problem: Pressure Injury - Risk of  Goal: *Prevention of pressure injury  Description: Document Volodymyr Scale and appropriate interventions in the flowsheet. Outcome: Progressing Towards Goal  Note: Pressure Injury Interventions:  Sensory Interventions: Assess changes in LOC,Check visual cues for pain,Discuss PT/OT consult with provider,Float heels,Keep linens dry and wrinkle-free,Maintain/enhance activity level,Minimize linen layers,Turn and reposition approx. every two hours (pillows and wedges if needed)    Moisture Interventions: Absorbent underpads,Apply protective barrier, creams and emollients,Check for incontinence Q2 hours and as needed    Activity Interventions: Increase time out of bed,Pressure redistribution bed/mattress(bed type),PT/OT evaluation    Mobility Interventions: Assess need for specialty bed,HOB 30 degrees or less,Pressure redistribution bed/mattress (bed type),PT/OT evaluation,Turn and reposition approx. every two hours(pillow and wedges)    Nutrition Interventions: Document food/fluid/supplement intake    Friction and Shear Interventions: Apply protective barrier, creams and emollients,Foam dressings/transparent film/skin sealants                Problem: Falls - Risk of  Goal: *Absence of Falls  Description: Document Marlin Fall Risk and appropriate interventions in the flowsheet.   Outcome: Progressing Towards Goal  Note: Fall Risk Interventions:  Mobility Interventions: Bed/chair exit alarm,OT consult for ADLs,Patient to call before getting OOB,PT Consult for mobility concerns,PT Consult for assist device competence    Mentation Interventions: Bed/chair exit alarm,Adequate sleep, hydration, pain control    Medication Interventions: Bed/chair exit alarm,Patient to call before getting OOB,Teach patient to arise slowly    Elimination Interventions: Bed/chair exit alarm,Call light in reach,Elevated toilet seat,Patient to call for help with toileting needs,Stay With Me (per policy),Toilet paper/wipes in reach,Urinal in reach    History of Falls Interventions: Bed/chair exit alarm

## 2022-01-15 NOTE — PROGRESS NOTES
Rounding done every hour and PRN while patient in room. Patient resting in room. No signs or symptoms of distress. No changes in status. Patient is a bit restless at times, taking off gown. Message was send to MD on call, no new orders received.

## 2022-01-15 NOTE — PROGRESS NOTES
Hospitalist      NAME:  Jewel Rios   Age:  82 y.o.  :   1935   MRN:   [id number]  PCP: Ericka Lauren MD  Consulting MD:  Treatment Team: Attending Provider: Charlean Olszewski, MD; Consulting Provider: Felecia Moran MD; Hospitalist: Charlean Olszewski, MD; Care Manager: Marcella Taylor; Primary Nurse: Robin Mclean, RN; Charge Nurse: Marsha Cordova; Occupational Therapist: Johnny Mosher OT; Physical Therapist: Pineda Stanton PT; Utilization Review: Davy Thompson      HPI:   Patient is a 82 y.o. male who presented to the ED for cc AMS since yesterday. Hx of prostate cancer, CAD s/p CABG, HTN, HLD, parkinsons disease with associated psychosis.    Vitlas - Stable but BP was elevated on arrival    Today, no ac events, on room air, much less confused, probably at baseline    Objective:       Data Review:   Recent Results (from the past 24 hour(s))   EKG, 12 LEAD, INITIAL    Collection Time: 01/15/22  6:34 AM   Result Value Ref Range    Ventricular Rate 67 BPM    Atrial Rate 67 BPM    P-R Interval 156 ms    QRS Duration 154 ms    Q-T Interval 468 ms    QTC Calculation (Bezet) 494 ms    Calculated P Axis 45 degrees    Calculated R Axis -23 degrees    Calculated T Axis -15 degrees    Diagnosis       Normal sinus rhythm  Right bundle branch block  Minimal voltage criteria for LVH, may be normal variant  Possible Lateral infarct , age undetermined  Inferior infarct , age undetermined  Abnormal ECG  When compared with ECG of 2022 17:39,  PREVIOUS ECG IS PRESENT     METABOLIC PANEL, BASIC    Collection Time: 01/15/22 10:40 AM   Result Value Ref Range    Sodium 142 138 - 145 mmol/L    Potassium 4.1 3.5 - 5.1 mmol/L    Chloride 108 (H) 98 - 107 mmol/L    CO2 27 21 - 32 mmol/L    Anion gap 7 7 - 16 mmol/L    Glucose 91 65 - 100 mg/dL    BUN 27 (H) 8 - 23 MG/DL    Creatinine 1.18 0.8 - 1.5 MG/DL    GFR est AA >60 >60 ml/min/1.73m2    GFR est non-AA >60 >60 ml/min/1.73m2    Calcium 9.2 8.3 - 10.4 MG/DL   PLEASE READ & DOCUMENT PPD TEST IN 24 HRS    Collection Time: 01/15/22 12:12 PM   Result Value Ref Range    PPD Negative Negative    mm Induration 0 0 - 5 mm       Physical Exam:     General:  Alert, disoriented, mild distress, pale   Lungs:   Clear to auscultation bilaterally. Heart:  Regular rate and rhythm, S1, S2 normal   Abdomen:   Soft, non-tender. Bowel sounds normal. No masses,  No organomegaly. Extremities: Ecchymosis to left dorsal hand, limited ROM   Neurologic: . Perrla, moves 4 limbs     Assessment and Plan     1- Acute encephalopathy, per admission, hx of Parkinson's disease. Improved or resolved. 2- Possible community acquired pneumonia, bilateral, covid-19 screen negative, on room air, minimal symptoms if any  3- Hypernatremia, hypovolemic, mild  4- QT interval prolongation noted  5- HTN, not well controlled  6- Hx of BPH, prostate Ca and CABG. Negative UA.  Stable    Rx:  Change IV abx to narrower po  AM lab  IVF hydration  BP control    Dispo: rehab  I called wife, left message    Signed By: Maribell Minaya MD   January 15, 2022

## 2022-01-15 NOTE — PROGRESS NOTES
Neurology Daily Progress Note     Assessment:     82year old male with hx of PD with autonomic dysfunction who presented with delusions and hallucinations. He is known to neurology. Azilact is associated with AMS and hallucinations, this has been discontinued. Will trial zonisamide 25 mg po daily to assist with on/off times and mood stabilization. Plan:     Continue Carbidopa-levodopa 25/100 mg  1.5 tablets every four times daily at 8am, 12pm, 4pm, 8pm.     Continue Carbidopa-levodopa CR 50/200mg 1 tab Qhs     Continue Seroquel 50 mg at noon and 50mg at qhs. Start Zonisamide 25 mg po qd. Discontinue Azilect. Nuplazid 34mg Q day. This is not hospital formulary, family will need to bring medication from home. If medications are not on formulary, have family bring in medications. Do not stop medications. Abrupt discontinuation/reduction of dopaminergic medications can be life threatening. If unable to swallow, give medications via NG tube. Avoid Geodon/Haldol    Continue to correct metabolic abnormalities. Management of Delirium     Non-pharmacological intervention  · Reorient the patient throughout the day  · Window open and lights on during the day. Lights off, television off, noises down during the night. If able, decrease nursing checks at night  · Therapies as often as possible  · Avoid restraints to the best of your ability   · Avoid sensory deprivation by using glasses and hearing aids, if applicable           Subjective: Interval history:    Alert, oriented to self. Pleasantly confused. Dystonic. Moves all extremities to command. History:    Billie Dyer is a 82 y.o. male who is being seen for PD with psychosis. Review of systems negative with exception of pertinent positives and negatives noted above.        Objective:     Vitals:    01/15/22 0140 01/15/22 0307 01/15/22 0347 01/15/22 0750   BP:   (!) 153/83    Pulse: (!) 57 68 62 66   Resp:   14    Temp:   97.6 °F (36.4 °C)    SpO2:   92%    Weight:       Height:              Current Facility-Administered Medications:     hydrALAZINE (APRESOLINE) tablet 25 mg, 25 mg, Oral, TID PRN, Pao Mehta MD    zonisamide (ZONEGRAN) capsule 50 mg, 50 mg, Oral, BID, Sherita Cotter, APRN    cloNIDine HCL (CATAPRES) tablet 0.2 mg, 0.2 mg, Oral, BID, Pao Mehta MD, 0.2 mg at 01/15/22 2782    tuberculin injection 5 Units, 5 Units, IntraDERMal, ONCE, Pao Mehta MD, 5 Units at 01/14/22 1246    influenza vaccine 2021-22 (6 mos+)(PF) (FLUARIX/FLULAVAL/FLUZONE QUAD) injection 0.5 mL, 1 Each, IntraMUSCular, PRIOR TO DISCHARGE, Alverto Lynch MD    QUEtiapine (SEROquel) tablet 50 mg, 50 mg, Oral, BID, Sherita Cotter, APRN, 50 mg at 01/14/22 2111    sodium chloride (NS) flush 5-40 mL, 5-40 mL, IntraVENous, Q8H, Debbi Brown, DO, 10 mL at 01/15/22 0534    sodium chloride (NS) flush 5-40 mL, 5-40 mL, IntraVENous, PRN, Edy Barceloneta, DO    acetaminophen (TYLENOL) tablet 650 mg, 650 mg, Oral, Q6H PRN **OR** acetaminophen (TYLENOL) suppository 650 mg, 650 mg, Rectal, Q6H PRN, Edy Barceloneta, DO    polyethylene glycol (MIRALAX) packet 17 g, 17 g, Oral, DAILY PRN, Edy Barceloneta, DO    ondansetron (ZOFRAN ODT) tablet 4 mg, 4 mg, Oral, Q8H PRN **OR** ondansetron (ZOFRAN) injection 4 mg, 4 mg, IntraVENous, Q6H PRN, Edy Maria Teresa, DO    aspirin delayed-release tablet 81 mg, 81 mg, Oral, DAILY, Edy Barceloneta, DO, 81 mg at 01/15/22 0806    carbidopa-levodopa ER (SINEMET CR)  mg per tablet 2 Tablet, 2 Tablet, Oral, QHS, Edy Morel DO, 2 Tablet at 01/14/22 1206    pimavanserin cap 34 mg (Nuplazid) pt supplied (Patient Supplied), 34 mg, Oral, DAILY, Debbi Brown DO    carbidopa-levodopa (SINEMET)  mg per tablet 1.5 Tablet, 1.5 Tablet, Oral, QID, Edy Morel DO, 1.5 Tablet at 01/15/22 0806    piperacillin-tazobactam (ZOSYN) 3.375 g in 0.9% sodium chloride (MBP/ADV) 100 mL MBP, 3.375 g, IntraVENous, Q8H, Cirilo Mohs, DO, Last Rate: 25 mL/hr at 01/15/22 0530, 3.375 g at 01/15/22 0530    Recent Results (from the past 12 hour(s))   EKG, 12 LEAD, INITIAL    Collection Time: 01/15/22  6:34 AM   Result Value Ref Range    Ventricular Rate 67 BPM    Atrial Rate 67 BPM    P-R Interval 156 ms    QRS Duration 154 ms    Q-T Interval 468 ms    QTC Calculation (Bezet) 494 ms    Calculated P Axis 45 degrees    Calculated R Axis -23 degrees    Calculated T Axis -15 degrees    Diagnosis       Normal sinus rhythm  Right bundle branch block  Minimal voltage criteria for LVH, may be normal variant  Possible Lateral infarct , age undetermined  Inferior infarct , age undetermined  Abnormal ECG  When compared with ECG of 13-JAN-2022 17:39,  PREVIOUS ECG IS PRESENT         Physical Exam:  General - elderly , well nourished, in no apparent distress. Confused and disoriented  HEENT - Normocephalic, atraumatic. Conjunctiva are clear. Neck - Supple without masses  Cardiovascular - Regular rate and rhythm. Normal S1, S2 without murmurs, rubs, or gallops. Lungs - Clear to auscultation. Abdomen - Soft, nontender with normal bowel sounds. Extremities - Peripheral pulses intact. Ecchymosis to left hand. No edema and no rashes. Neurological examination - Oriented to self. Comprehension, attention, memory and reasoning are impaired. Language and speech are normal.   On cranial nerve examination, (II, III, IV, VI) pupils are equal, round, and reactive to light. Visual acuity is adequate. Visual fields are full to finger confrontation. Extraocular motility is normal. (V, VII) Face is symmetric and sensation is intact to light touch. (VIII) Hearing is intact. (IX, X) Palate and uvula elevate symmetrically. Voice is hypophonic. (XI) Shoulder shrug is strong and equal bilaterally. (XII)Tongue is midline. Motor examination - There is increase muscle tone and bulk, cogwheeling. Diffuse dystonia.  Power is full throughout, moves all extremities against gravity to command. Muscle stretch reflexes are1+ throughout. Plantar response is flexor. Unable to assess sensation, cerebellar examination or gait/stance due to AMS. Signed By: MARCO ANTONIO Josue     January 15, 2022      Neurology attending    Patient seen this morning at 0915 hrs. and at this time he is clearly on from the standpoint of his carbidopa levodopa he is evidencing some degree of peak dose dyskinesia which is multifocal.  Voice is soft he is not delusional currently but clearly has severe cognitive impairment. Observed for motor fluctuations and variability in the room. Management here is obviously difficult  In terms of motor fluctuations given the substantial delusions he is not a candidate for dopamine agonists. Certainly on the basis of the recent literature he should be maintained on MAO B inhibition. This is clearly preferred versus COMT inhibition. Zonisamide is approved in [place] and clinical trial data would indicate that the dosage of 25 mg daily is accompanied by reduction in motor fluctuations. On a theoretical basis this is additionally a mood stabilizer although I suspect it will have little efficacy here.

## 2022-01-15 NOTE — PROGRESS NOTES
ACUTE PHYSICAL THERAPY GOALS:  (Developed with and agreed upon by patient and/or caregiver.)    (1.) Bebe Delgadillo  will move from supine to sit and sit to supine  with STAND BY ASSIST within 3 treatment day(s). (2.) Bebe Delgadillo will transfer from bed to chair and chair to bed with CONTACT GUARD ASSIST using the least restrictive device within 3 treatment day(s). (3.) Bebe Delgadillo will ambulate with CONTACT GUARD ASSIST for 10 feet with the least restrictive device within 3 treatment day(s). (4.) Bebe Delgadillo will perform standing static and dynamic balance activities x 10+ minutes with CONTACT GUARD ASSIST to improve safety within 3 treatment day(s). (5.) Bebe Delgadillo will improved seated balance to stand by assist to improve functional mobility and safety within 3 treatment day(s).       PHYSICAL THERAPY ASSESSMENT: Initial Assessment PT Treatment Day # 1      Bebe Delgadillo is a 82 y.o. male   PRIMARY DIAGNOSIS: Metabolic encephalopathy  Metabolic encephalopathy [B72.62]  Bacterial pneumonia [J15.9]  [facility] [G20]  Acute encephalopathy [G93.40]       Reason for Referral:  Parkinson's, encephalopathy  ICD-10: Treatment Diagnosis: Generalized Muscle Weakness (M62.81)  Other lack of cordination (R27.8)  Difficulty in walking, Not elsewhere classified (R26.2)  Dizziness and Giddiness (R42)  OBSERV[place]: [place] / [place] / Product Type: Medicare /     ASSESSMENT:     REHAB RECOMMENDATIONS:   Recommendation to date pending progress:  Setting:   Short-term Rehab  Equipment:    To Be Determined     PRIOR LEVEL OF FUNCTION:  (Prior to Hospitalization) INITIAL/CURRENT LEVEL OF FUNCTION:  (Most Recently Demonstrated)   Bed Mobility:   Unknown  Sit to Stand:   Unknown  Transfers:   Unknown  Gait/Mobility:   Unknown Bed Mobility:   Minimal Assistance  Sit to Stand:   Minimal Assistance x 2  Transfers:   Minimal Assistance  Gait/Mobility:   Not tested     ASSESSMENT:  Mr. Yg Ramos was admitted w/ psychosis and encephalopathy, has a hx of Parkinson's with associated psychosis. He was not agitated or visibly confused during eval. He is a poor historian, but states he has a rolling walker he does not use and has had several falls. Pt presented generally weak with deficits in cognition, mobility, balance, and coordination impacting ADLs. Pt very unsteady and tremulous with jerking and writhing movements noted during ADLs and mobility for ADLs, poor sitting and standing balance. Pt is at a high risk for falls d/t impaired balance and cognition. Pt required min X2 to Max A x 1 at times seated balance. He was Angelica x 2 for sit to stand and then needed to return to bed due to dizziness. Pt may benefit from skilled therapy services to address stated deficits to promote return to highest level of function, independence, and safety. Will continue to follow. SUBJECTIVE:   Mr. Yg Ramos states, \"I don't use my walker\"    SOCIAL HISTORY/LIVING ENVIRONMENT: Per chart, pt was previously living with his wife in an independent living facility.    Home Environment: Patient room  One/Two Story Residence: One story  Living Alone: No  Support Systems: Spouse/Significant Other,Child(russell)  OBJECTIVE:     PAIN: VITAL SIGNS: LINES/DRAINS:   Pre Treatment:  0/10  Post Treatment: 0/10     O2 Device: None (Room air)     GROSS EVALUATION:   Within Functional Limits Abnormal/ Functional Abnormal/ Non-Functional (see comments) Not Tested Comments:   AROM [] [] [x] []    PROM [] [] [] []    Strength [] [] [x] []    Balance [] [] [x] []    Posture [] [] [x] []    Sensation [] [] [] []    Coordination [] [] [x] []    Tone [] [] [] []    Edema [] [] [] []    Activity Tolerance [] [] [x] []     [] [] [] []      COGNITION/  PERCEPTION: Intact Impaired   (see comments) Comments:   Orientation [] [x]    Vision [] []    Hearing [x] []    Command Following [] [x] Safety Awareness [] [x]     [] []      MOBILITY: I Mod I S SBA CGA Min Mod Max Total  NT x2 Comments:   Bed Mobility    Rolling [] [] [] [] [x] [] [] [] [] [] []    Supine to Sit [] [] [] [] [] [x] [] [] [] [] []    Scooting [] [] [] [] [] [] [] [] [] [] []    Sit to Supine [] [] [] [] [] [x] [] [] [] [] []    Transfers    Sit to Stand [] [] [] [] [x] [] [] [] [] [] []    Bed to Chair [] [] [] [] [] [] [] [] [] [x] []    Stand to Sit [] [] [] [] [] [] [] [] [] [x] []    I=Independent, Mod I=Modified Independent, S=Supervision, SBA=Standby Assistance, CGA=Contact Guard Assistance,   Min=Minimal Assistance, Mod=Moderate Assistance, Max=Maximal Assistance, Total=Total Assistance, NT=Not Tested  GAIT: I Mod I S SBA CGA Min Mod Max Total  NT x2 Comments:   Level of Assistance [] [] [] [] [] [] [] [] [] [x] []    Distance     DME     Gait Quality     Weightbearing Status     I=Independent, Mod I=Modified Independent, S=Supervision, SBA=Standby Assistance, CGA=Contact Guard Assistance,   Min=Minimal Assistance, Mod=Moderate Assistance, Max=Maximal Assistance, Total=Total Assistance, NT=Not Tested    [address] [facility] [address] [id number] AM-PAC 6 Clicks   Basic Mobility Inpatient Short Form       How much difficulty does the patient currently have. .. Unable A Lot A Little None   1. Turning over in bed (including adjusting bedclothes, sheets and blankets)? [] 1   [] 2   [x] 3   [] 4   2. Sitting down on and standing up from a chair with arms ( e.g., wheelchair, bedside commode, etc.)   [] 1   [] 2   [x] 3   [] 4   3. Moving from lying on back to sitting on the side of the bed? [] 1   [] 2   [] 3   [] 4   How much help from another person does the patient currently need. .. Total A Lot A Little None   4. Moving to and from a bed to a chair (including a wheelchair)? [] 1   [x] 2   [] 3   [] 4   5. Need to walk in hospital room? [] 1   [x] 2   [] 3   [] 4   6. Climbing 3-5 steps with a railing?    [] 1   [x] 2   [] 3   [] 4   © 2007, Trustees of [facility] [employer], under license to [employer]. All rights reserved     Score:  Initial: 12 Most Recent: X (Date: -- )    Interpretation of Tool:  Represents activities that are increasingly more difficult (i.e. Bed mobility, Transfers, Gait). PLAN:   FREQUENCY/DURATION: PT Plan of Care: 3 times/week for duration of hospital stay or until stated goals are met, whichever comes first.    PROBLEM LIST:   (Skilled intervention is medically necessary to address:)  1. Decreased ADL/Functional Activities  2. Decreased Activity Tolerance  3. Decreased AROM/PROM  4. Decreased Balance  5. Decreased Cognition  6. Decreased Coordination  7. Decreased Gait Ability  8. Decreased Strength  9. Decreased Transfer Abilities   INTERVENTIONS PLANNED:   (Benefits and precautions of physical therapy have been discussed with the patient.)  1. Therapeutic Activity  2. Therapeutic Exercise/HEP  3. Neuromuscular Re-education  4. Gait Training  5. Manual Therapy  6. Education     TREATMENT:     EVALUATION: Moderate Complexity : (Untimed Charge)    TREATMENT:   (     )  Therapeutic Activity (10 Minutes): Therapeutic activity included Rolling, Supine to Sit, Sit to Supine, Scooting, Sitting balance  and Standing balance to improve functional Mobility, Strength, ROM and Activity tolerance.     TREATMENT GRID:   Date:   Date:   Date:     Activity/Exercise Parameters Parameters Parameters                                                   AFTER TREATMENT POSITION/PRECAUTIONS:  Alarm Activated, Bed and Needs within reach    INTERDISCIPLINARY COLLABORATION:  RN/PCT and OT/CAMPOVERDE    TOTAL TREATMENT DURATION:  PT Patient Time In/Time Out  Time In: 0928  Time Out: R Micah Schmidt 2 Shen, PT, DPT

## 2022-01-16 LAB
ATRIAL RATE: 276 BPM
CALCULATED P AXIS, ECG09: 93 DEGREES
CALCULATED R AXIS, ECG10: -9 DEGREES
CALCULATED T AXIS, ECG11: 19 DEGREES
DIAGNOSIS, 93000: NORMAL
MAGNESIUM SERPL-MCNC: 2.1 MG/DL (ref 1.8–2.4)
MM INDURATION POC: 0 MM (ref 0–5)
PPD POC: NEGATIVE NEGATIVE
Q-T INTERVAL, ECG07: 480 MS
QRS DURATION, ECG06: 144 MS
QTC CALCULATION (BEZET), ECG08: 514 MS
VENTRICULAR RATE, ECG03: 69 BPM

## 2022-01-16 PROCEDURE — 74011250636 HC RX REV CODE- 250/636: Performed by: HOSPITALIST

## 2022-01-16 PROCEDURE — 2709999900 HC NON-CHARGEABLE SUPPLY

## 2022-01-16 PROCEDURE — 99233 SBSQ HOSP IP/OBS HIGH 50: CPT | Performed by: PSYCHIATRY & NEUROLOGY

## 2022-01-16 PROCEDURE — 74011250636 HC RX REV CODE- 250/636: Performed by: FAMILY MEDICINE

## 2022-01-16 PROCEDURE — 74011250637 HC RX REV CODE- 250/637: Performed by: HOSPITALIST

## 2022-01-16 PROCEDURE — 36415 COLL VENOUS BLD VENIPUNCTURE: CPT

## 2022-01-16 PROCEDURE — 65270000029 HC RM PRIVATE

## 2022-01-16 PROCEDURE — 74011000250 HC RX REV CODE- 250: Performed by: FAMILY MEDICINE

## 2022-01-16 PROCEDURE — 93005 ELECTROCARDIOGRAM TRACING: CPT | Performed by: HOSPITALIST

## 2022-01-16 PROCEDURE — 99232 SBSQ HOSP IP/OBS MODERATE 35: CPT | Performed by: NURSE PRACTITIONER

## 2022-01-16 PROCEDURE — 74011250637 HC RX REV CODE- 250/637: Performed by: FAMILY MEDICINE

## 2022-01-16 PROCEDURE — 65660000000 HC RM CCU STEPDOWN

## 2022-01-16 PROCEDURE — 74011250637 HC RX REV CODE- 250/637: Performed by: NURSE PRACTITIONER

## 2022-01-16 PROCEDURE — 83735 ASSAY OF MAGNESIUM: CPT

## 2022-01-16 RX ORDER — METOPROLOL TARTRATE 25 MG/1
25 TABLET, FILM COATED ORAL 2 TIMES DAILY
Status: DISCONTINUED | OUTPATIENT
Start: 2022-01-16 | End: 2022-01-18

## 2022-01-16 RX ORDER — CARBIDOPA AND LEVODOPA 25; 100 MG/1; MG/1
1.5 TABLET, ORALLY DISINTEGRATING ORAL AS NEEDED
Status: DISCONTINUED | OUTPATIENT
Start: 2022-01-16 | End: 2022-01-26 | Stop reason: HOSPADM

## 2022-01-16 RX ORDER — SODIUM CHLORIDE 9 MG/ML
75 INJECTION, SOLUTION INTRAVENOUS CONTINUOUS
Status: DISPENSED | OUTPATIENT
Start: 2022-01-16 | End: 2022-01-17

## 2022-01-16 RX ORDER — METOPROLOL TARTRATE 5 MG/5ML
5 INJECTION INTRAVENOUS
Status: DISCONTINUED | OUTPATIENT
Start: 2022-01-16 | End: 2022-01-26 | Stop reason: HOSPADM

## 2022-01-16 RX ADMIN — METOPROLOL TARTRATE 25 MG: 25 TABLET, FILM COATED ORAL at 21:18

## 2022-01-16 RX ADMIN — SODIUM CHLORIDE, PRESERVATIVE FREE 10 ML: 5 INJECTION INTRAVENOUS at 21:27

## 2022-01-16 RX ADMIN — SODIUM CHLORIDE 75 ML/HR: 900 INJECTION, SOLUTION INTRAVENOUS at 12:44

## 2022-01-16 RX ADMIN — QUETIAPINE FUMARATE 50 MG: 100 TABLET ORAL at 11:36

## 2022-01-16 RX ADMIN — DOXYCYCLINE HYCLATE 100 MG: 100 CAPSULE ORAL at 10:05

## 2022-01-16 RX ADMIN — ASPIRIN 81 MG: 81 TABLET ORAL at 10:05

## 2022-01-16 RX ADMIN — CEFUROXIME AXETIL 250 MG: 250 TABLET ORAL at 10:05

## 2022-01-16 RX ADMIN — METOPROLOL TARTRATE 25 MG: 25 TABLET, FILM COATED ORAL at 10:00

## 2022-01-16 RX ADMIN — CARBIDOPA AND LEVODOPA 2 TABLET: 25; 100 TABLET, EXTENDED RELEASE ORAL at 21:13

## 2022-01-16 RX ADMIN — QUETIAPINE FUMARATE 50 MG: 100 TABLET ORAL at 21:13

## 2022-01-16 RX ADMIN — CLONIDINE HYDROCHLORIDE 0.1 MG: 0.1 TABLET ORAL at 06:59

## 2022-01-16 RX ADMIN — CARBIDOPA AND LEVODOPA 1.5 TABLET: 25; 100 TABLET ORAL at 10:05

## 2022-01-16 RX ADMIN — DOXYCYCLINE HYCLATE 100 MG: 100 CAPSULE ORAL at 21:13

## 2022-01-16 RX ADMIN — CARBIDOPA AND LEVODOPA 1.5 TABLET: 25; 100 TABLET ORAL at 15:01

## 2022-01-16 RX ADMIN — WATER 10 MG: 1 INJECTION INTRAMUSCULAR; INTRAVENOUS; SUBCUTANEOUS at 02:51

## 2022-01-16 RX ADMIN — CLONIDINE HYDROCHLORIDE 0.1 MG: 0.1 TABLET ORAL at 21:13

## 2022-01-16 RX ADMIN — SODIUM CHLORIDE, PRESERVATIVE FREE 10 ML: 5 INJECTION INTRAVENOUS at 05:47

## 2022-01-16 RX ADMIN — SODIUM CHLORIDE, PRESERVATIVE FREE 10 ML: 5 INJECTION INTRAVENOUS at 13:21

## 2022-01-16 RX ADMIN — CEFUROXIME AXETIL 250 MG: 250 TABLET ORAL at 21:13

## 2022-01-16 RX ADMIN — ZONISAMIDE 25 MG: 25 CAPSULE ORAL at 10:06

## 2022-01-16 RX ADMIN — CARBIDOPA AND LEVODOPA 1.5 TABLET: 25; 100 TABLET ORAL at 21:16

## 2022-01-16 NOTE — PROGRESS NOTES
Neurology Daily Progress Note     Assessment:     82year old male with hx of PD with autonomic dysfunction who presented with delusions and hallucinations. He is known to neurology. Azilact is associated with AMS and hallucinations, this has been discontinued. Increased agitation overnight and received IM geodon. Avoid Geodon/Haldol as these medications can be lethal in patients with Parkinson's disease. Plan:     Continue Carbidopa-levodopa 25/100 mg  1.5 tablets every four times daily at 8am, 12pm, 4pm, 8pm. If unable to swallow, administer Parcopa 25/100 mg ODT. Continue Carbidopa-levodopa CR 50/200mg 1 tab Qhs     Continue Seroquel 50 mg at noon and 50mg at qhs. Continue Zonisamide 25 mg po qd. Nuplazid 34mg Q day. This is not hospital formulary, family will need to bring medication from home. If medications are not on formulary, have family bring in medications. Do not stop medications. Abrupt discontinuation/reduction of dopaminergic medications can be life threatening. If unable to swallow, give medications via NG tube. Avoid Geodon/Haldol    Continue to correct metabolic abnormalities. Management of Delirium     Non-pharmacological intervention  · Reorient the patient throughout the day  · Window open and lights on during the day. Lights off, television off, noises down during the night. If able, decrease nursing checks at night  · Therapies as often as possible  · Avoid restraints to the best of your ability   · Avoid sensory deprivation by using glasses and hearing aids, if applicable         Subjective: Interval history:    Alert, oriented to self. Pleasantly confused. Moves all extremities to command. Increased confusion overnight, IV Geodon administered per nursing. History:    Selvin Finch is a 82 y.o. male who is being seen for PD with psychosis. Review of systems negative with exception of pertinent positives and negatives noted above. Objective:     Vitals:    01/15/22 2259 01/16/22 0428 01/16/22 0657 01/16/22 0751   BP: (!) 160/92 (!) 181/85 (!) 199/96 (!) 155/108   Pulse: 78 68  91   Resp: 18 18 22   Temp: 97.5 °F (36.4 °C) 98.2 °F (36.8 °C)  98.5 °F (36.9 °C)   SpO2: 96% 98%  96%   Weight:       Height:              Current Facility-Administered Medications:     metoprolol (LOPRESSOR) injection 5 mg, 5 mg, IntraVENous, Q6H PRN, Liliane Manzanares MD    metoprolol tartrate (LOPRESSOR) tablet 25 mg, 25 mg, Oral, BID, Liliane Manzanares MD, 25 mg at 01/16/22 1000    zonisamide (ZONEGRAN) capsule 25 mg, 25 mg, Oral, DAILY, Obdulio Cotterssscott CABRERA, APRN, 25 mg at 01/16/22 1006    cefUROXime (CEFTIN) tablet 250 mg, 250 mg, Oral, Q12H, Liliane Manzanares MD, 250 mg at 01/16/22 1005    doxycycline (VIBRAMYCIN) capsule 100 mg, 100 mg, Oral, Q12H, Liliane Manzanares MD, 100 mg at 01/16/22 1005    cloNIDine HCL (CATAPRES) tablet 0.1 mg, 0.1 mg, Oral, BID PRN, Liliane Manzanares MD, 0.1 mg at 01/16/22 8749    influenza vaccine 2021-22 (6 mos+)(PF) (FLUARIX/FLULAVAL/FLUZONE QUAD) injection 0.5 mL, 1 Each, IntraMUSCular, PRIOR TO DISCHARGE, Ant Lynch MD    QUEtiapine (SEROquel) tablet 50 mg, 50 mg, Oral, BID, RiceObdulioSherita RICK, APRN, 50 mg at 01/15/22 2119    sodium chloride (NS) flush 5-40 mL, 5-40 mL, IntraVENous, Q8H, Debbi Brown DO, 10 mL at 01/16/22 0547    sodium chloride (NS) flush 5-40 mL, 5-40 mL, IntraVENous, PRN, William Alias, DO    acetaminophen (TYLENOL) tablet 650 mg, 650 mg, Oral, Q6H PRN **OR** acetaminophen (TYLENOL) suppository 650 mg, 650 mg, Rectal, Q6H PRN, William Perez DO    polyethylene glycol (MIRALAX) packet 17 g, 17 g, Oral, DAILY PRN, William Perez DO    ondansetron (ZOFRAN ODT) tablet 4 mg, 4 mg, Oral, Q8H PRN **OR** ondansetron (ZOFRAN) injection 4 mg, 4 mg, IntraVENous, Q6H PRN, William Perez DO    aspirin delayed-release tablet 81 mg, 81 mg, Oral, DAILY, Nargis Jordan DO, 81 mg at 01/16/22 1005    carbidopa-levodopa ER (SINEMET CR)  mg per tablet 2 Tablet, 2 Tablet, Oral, QHS, Shila Brown [place], DO, 2 Tablet at 01/15/22 2119    pimavanserin cap 34 mg (Nuplazid) pt supplied (Patient Supplied), 34 mg, Oral, DAILY, Canary Roots, DO, 34 mg at 01/16/22 1005    carbidopa-levodopa (SINEMET)  mg per tablet 1.5 Tablet, 1.5 Tablet, Oral, QID, Canary Roots, DO, 1.5 Tablet at 01/16/22 1005    No results found for this or any previous visit (from the past 12 hour(s)). Physical Exam:  General - elderly , well nourished, in no apparent distress. Confused and disoriented  HEENT - Normocephalic, atraumatic. Conjunctiva are clear. Neck - Supple without masses  Cardiovascular - Regular rate and rhythm. Normal S1, S2 without murmurs, rubs, or gallops. Lungs - Clear to auscultation. Abdomen - Soft, nontender with normal bowel sounds. Extremities - Peripheral pulses intact. Ecchymosis to left hand. No edema and no rashes. Neurological examination - Oriented to self. Comprehension, attention, memory and reasoning are impaired. Language and speech are normal.   On cranial nerve examination, (II, III, IV, VI) pupils are equal, round, and reactive to light. Visual acuity is adequate. Visual fields are full to finger confrontation. Extraocular motility is normal. (V, VII) Face is symmetric and sensation is intact to light touch. (VIII) Hearing is intact. (IX, X) Palate and uvula elevate symmetrically. Voice is hypophonic. (XI) Shoulder shrug is strong and equal bilaterally. (XII)Tongue is midline. Motor examination - There is increase muscle tone and bulk, cogwheeling. Power is full throughout, moves all extremities against gravity to command. Muscle stretch reflexes are1+ throughout. Plantar response is flexor. Unable to assess sensation, cerebellar examination or gait/stance due to AMS.        Signed By: Osei Farrell, MARCO ANTONIO     January 16, 2022

## 2022-01-16 NOTE — PROGRESS NOTES
Problem: Pressure Injury - Risk of  Goal: *Prevention of pressure injury  Description: Document Volodymyr Scale and appropriate interventions in the flowsheet. Outcome: Progressing Towards Goal  Note: Pressure Injury Interventions:  Sensory Interventions: Assess need for specialty bed    Moisture Interventions: Absorbent underpads    Activity Interventions: Increase time out of bed    Mobility Interventions: Assess need for specialty bed    Nutrition Interventions: Document food/fluid/supplement intake    Friction and Shear Interventions: HOB 30 degrees or less,Minimize layers                Problem: Patient Education: Go to Patient Education Activity  Goal: Patient/Family Education  Outcome: Progressing Towards Goal     Problem: Falls - Risk of  Goal: *Absence of Falls  Description: Document Marlin Fall Risk and appropriate interventions in the flowsheet.   Outcome: Progressing Towards Goal  Note: Fall Risk Interventions:  Mobility Interventions: Bed/chair exit alarm    Mentation Interventions: Bed/chair exit alarm    Medication Interventions: Bed/chair exit alarm    Elimination Interventions: Call light in reach,Bed/chair exit alarm    History of Falls Interventions: Bed/chair exit alarm         Problem: Patient Education: Go to Patient Education Activity  Goal: Patient/Family Education  Outcome: Progressing Towards Goal     Problem: Patient Education: Go to Patient Education Activity  Goal: Patient/Family Education  Outcome: Progressing Towards Goal

## 2022-01-16 NOTE — PROGRESS NOTES
Pt is alert, remains confused, oriented to person only. Pt in bed, resting. Bed in low position, wheels locked, call light within reach, instructed to call with any needs. Bed alarm on. Pt remains in mittens. Hourly rounds completed this shift. NAD noted. VSS. Pt has not c/o pain. Poor appetite noted. Pt converted from sinus rhythm to A Flutter this am, hospitalist aware, see new orders. IV is infusing, and dressing is clean, dry, and intact. Report to be given to night shift nurse.

## 2022-01-16 NOTE — PROGRESS NOTES
Hospitalist      NAME:  Claudine Garg   Age:  82 y.o.  :   1935   MRN:   [id number]  PCP: Earline Peterson MD  Consulting MD:  Treatment Team: Attending Provider: Errol Galeano MD; Consulting Provider: Dori Ugarte MD; Hospitalist: Errol Galeano MD; Care Manager: Sheela Mitchell; Utilization Review: Lázaro Norman; Primary Nurse: Derrel Lesches, RN      HPI:   Patient is a 82 y.o. male who presented to the ED for cc AMS since yesterday. Hx of prostate cancer, CAD s/p CABG, HTN, HLD, parkinsons disease with associated psychosis. Vitlas - Stable but BP was elevated on arrival    Today, more confused and agitated at times, refused po meds, did not eat breakfast, in mittens. New atrial flutter noted    Objective:       Data Review:   No results found for this or any previous visit (from the past 24 hour(s)). Physical Exam:     General:  Alert, disoriented, moderate distress, pale   Lungs:   Clear to auscultation bilaterally. Heart:  Regular rate and rhythm, S1, S2 normal   Abdomen:   Soft, non-tender. Bowel sounds normal. No masses,  No organomegaly. Extremities: Ecchymosis to left dorsal hand, limited ROM   Neurologic: . Perrla, moves 4 limbs, no rigidity     Assessment and Plan     1- Acute encephalopathy, per admission, hx of Parkinson's disease, waxing and weaning, followed by Neurology. 2- Possible community acquired pneumonia, bilateral, covid-19 screen negative, on room air, minimal symptoms if any  3- Hypernatremia, hypovolemic, mild  4- QT interval prolongation noted  5- HTN, not well controlled  6- Hx of BPH, prostate Ca and CABG. Negative UA.  Stable  7- New atrial flutter, normal TSH    Rx:  Changed IV abx to narrower po  AM lab as needed  IVF hydration until able to take adequate po  BP control  BBL  Check echo, Mg    Dispo: rehab    Signed By: Fabiana Street MD   2022

## 2022-01-16 NOTE — PROGRESS NOTES
Patient very anxious and agitated and pulling at his IV and tele monitor. Patient attempting to exit the bed. Contact Dr Niurka Starks regarding this. Order for Geodon IM x1 ordered for patient.

## 2022-01-17 ENCOUNTER — APPOINTMENT (OUTPATIENT)
Dept: NON INVASIVE DIAGNOSTICS | Age: 87
DRG: 056 | End: 2022-01-17
Attending: HOSPITALIST
Payer: MEDICARE

## 2022-01-17 LAB
ANION GAP SERPL CALC-SCNC: 9 MMOL/L (ref 7–16)
BUN SERPL-MCNC: 31 MG/DL (ref 8–23)
CALCIUM SERPL-MCNC: 9.5 MG/DL (ref 8.3–10.4)
CHLORIDE SERPL-SCNC: 109 MMOL/L (ref 98–107)
CO2 SERPL-SCNC: 26 MMOL/L (ref 21–32)
CREAT SERPL-MCNC: 1.03 MG/DL (ref 0.8–1.5)
ECHO AO ASC DIAM: 3.4 CM
ECHO AO ASCENDING AORTA INDEX: 1.86 CM/M2
ECHO AO ROOT DIAM: 3.4 CM
ECHO AO ROOT INDEX: 1.86 CM/M2
ECHO AV AREA PEAK VELOCITY: 1.3 CM2
ECHO AV AREA VTI: 1.3 CM2
ECHO AV AREA/BSA PEAK VELOCITY: 0.7 CM2/M2
ECHO AV AREA/BSA VTI: 0.7 CM2/M2
ECHO AV MEAN GRADIENT: 13 MMHG
ECHO AV MEAN VELOCITY: 1.6 M/S
ECHO AV PEAK GRADIENT: 25 MMHG
ECHO AV PEAK VELOCITY: 2.5 M/S
ECHO AV VELOCITY RATIO: 0.44
ECHO AV VTI: 52.4 CM
ECHO EST RA PRESSURE: 3 MMHG
ECHO IVC PROX: 2 CM
ECHO LA AREA 2C: 21.6 CM2
ECHO LA AREA 4C: 23.3 CM2
ECHO LA DIAMETER INDEX: 1.97 CM/M2
ECHO LA DIAMETER: 3.6 CM
ECHO LA MAJOR AXIS: 6.2 CM
ECHO LA MINOR AXIS: 5.6 CM
ECHO LA TO AORTIC ROOT RATIO: 1.06
ECHO LA VOL BP: 71 ML (ref 18–58)
ECHO LA VOL/BSA BIPLANE: 39 ML/M2 (ref 16–34)
ECHO LV E' LATERAL VELOCITY: 16 CM/S
ECHO LV E' SEPTAL VELOCITY: 7 CM/S
ECHO LV EDV A2C: 166 ML
ECHO LV EDV A4C: 155 ML
ECHO LV EDV BP: 162 ML (ref 67–155)
ECHO LV EDV INDEX A4C: 85 ML/M2
ECHO LV EDV INDEX BP: 89 ML/M2
ECHO LV EDV NDEX A2C: 91 ML/M2
ECHO LV EJECTION FRACTION A2C: 53 %
ECHO LV EJECTION FRACTION A4C: 52 %
ECHO LV EJECTION FRACTION BIPLANE: 53 % (ref 55–100)
ECHO LV ESV A2C: 78 ML
ECHO LV ESV A4C: 74 ML
ECHO LV ESV INDEX A2C: 43 ML/M2
ECHO LV ESV INDEX A4C: 40 ML/M2
ECHO LV FRACTIONAL SHORTENING: 28 % (ref 28–44)
ECHO LV INTERNAL DIMENSION DIASTOLE INDEX: 2.95 CM/M2
ECHO LV INTERNAL DIMENSION DIASTOLIC: 5.4 CM (ref 4.2–5.9)
ECHO LV INTERNAL DIMENSION SYSTOLIC INDEX: 2.13 CM/M2
ECHO LV INTERNAL DIMENSION SYSTOLIC: 3.9 CM
ECHO LV IVSD: 1.1 CM (ref 0.6–1)
ECHO LV MASS 2D: 220.6 G (ref 88–224)
ECHO LV MASS INDEX 2D: 120.5 G/M2 (ref 49–115)
ECHO LV POSTERIOR WALL DIASTOLIC: 1 CM (ref 0.6–1)
ECHO LV RELATIVE WALL THICKNESS RATIO: 0.37
ECHO LVOT AREA: 2.8 CM2
ECHO LVOT AV VTI INDEX: 0.47
ECHO LVOT DIAM: 1.9 CM
ECHO LVOT MEAN GRADIENT: 2 MMHG
ECHO LVOT PEAK GRADIENT: 5 MMHG
ECHO LVOT PEAK VELOCITY: 1.1 M/S
ECHO LVOT STROKE VOLUME INDEX: 37.8 ML/M2
ECHO LVOT SV: 69.1 ML
ECHO LVOT VTI: 24.4 CM
ECHO MV A VELOCITY: 0.58 M/S
ECHO MV E DECELERATION TIME (DT): 225 MS
ECHO MV E VELOCITY: 0.8 M/S
ECHO MV E/A RATIO: 1.38
ECHO MV E/E' LATERAL: 5
ECHO MV E/E' RATIO (AVERAGED): 8.21
ECHO MV E/E' SEPTAL: 11.43
ECHO PULMONARY ARTERY END DIASTOLIC PRESSURE: 6 MMHG
ECHO PV REGURGITANT MAX VELOCITY: 1.2 M/S
ECHO RIGHT VENTRICULAR SYSTOLIC PRESSURE (RVSP): 41 MMHG
ECHO RV BASAL DIMENSION: 4.8 CM
ECHO RV INTERNAL DIMENSION: 2.7 CM
ECHO RV MID DIMENSION: 3.5 CM
ECHO RV TAPSE: 1.7 CM (ref 1.5–2)
ECHO TV REGURGITANT MAX VELOCITY: 3.07 M/S
ECHO TV REGURGITANT PEAK GRADIENT: 38 MMHG
GLUCOSE SERPL-MCNC: 83 MG/DL (ref 65–100)
MAGNESIUM SERPL-MCNC: 2.1 MG/DL (ref 1.8–2.4)
POTASSIUM SERPL-SCNC: 4.1 MMOL/L (ref 3.5–5.1)
SODIUM SERPL-SCNC: 144 MMOL/L (ref 138–145)

## 2022-01-17 PROCEDURE — 83735 ASSAY OF MAGNESIUM: CPT

## 2022-01-17 PROCEDURE — 65660000000 HC RM CCU STEPDOWN

## 2022-01-17 PROCEDURE — 80048 BASIC METABOLIC PNL TOTAL CA: CPT

## 2022-01-17 PROCEDURE — 99232 SBSQ HOSP IP/OBS MODERATE 35: CPT | Performed by: PSYCHIATRY & NEUROLOGY

## 2022-01-17 PROCEDURE — 74011250637 HC RX REV CODE- 250/637: Performed by: FAMILY MEDICINE

## 2022-01-17 PROCEDURE — 36415 COLL VENOUS BLD VENIPUNCTURE: CPT

## 2022-01-17 PROCEDURE — 93306 TTE W/DOPPLER COMPLETE: CPT

## 2022-01-17 PROCEDURE — 65270000029 HC RM PRIVATE

## 2022-01-17 PROCEDURE — 74011250637 HC RX REV CODE- 250/637: Performed by: NURSE PRACTITIONER

## 2022-01-17 PROCEDURE — 74011000250 HC RX REV CODE- 250: Performed by: FAMILY MEDICINE

## 2022-01-17 PROCEDURE — 74011250637 HC RX REV CODE- 250/637: Performed by: HOSPITALIST

## 2022-01-17 RX ADMIN — CEFUROXIME AXETIL 250 MG: 250 TABLET ORAL at 21:10

## 2022-01-17 RX ADMIN — METOPROLOL TARTRATE 25 MG: 25 TABLET, FILM COATED ORAL at 09:28

## 2022-01-17 RX ADMIN — QUETIAPINE FUMARATE 50 MG: 100 TABLET ORAL at 11:48

## 2022-01-17 RX ADMIN — METOPROLOL TARTRATE 25 MG: 25 TABLET, FILM COATED ORAL at 17:33

## 2022-01-17 RX ADMIN — ZONISAMIDE 25 MG: 25 CAPSULE ORAL at 09:28

## 2022-01-17 RX ADMIN — CEFUROXIME AXETIL 250 MG: 250 TABLET ORAL at 09:27

## 2022-01-17 RX ADMIN — ASPIRIN 81 MG: 81 TABLET ORAL at 09:27

## 2022-01-17 RX ADMIN — APIXABAN 5 MG: 5 TABLET, FILM COATED ORAL at 17:33

## 2022-01-17 RX ADMIN — CARBIDOPA AND LEVODOPA 1.5 TABLET: 25; 100 TABLET ORAL at 09:27

## 2022-01-17 RX ADMIN — SODIUM CHLORIDE, PRESERVATIVE FREE 10 ML: 5 INJECTION INTRAVENOUS at 15:32

## 2022-01-17 RX ADMIN — DOXYCYCLINE HYCLATE 100 MG: 100 CAPSULE ORAL at 09:28

## 2022-01-17 RX ADMIN — CARBIDOPA AND LEVODOPA 1.5 TABLET: 25; 100 TABLET ORAL at 11:48

## 2022-01-17 RX ADMIN — DOXYCYCLINE HYCLATE 100 MG: 100 CAPSULE ORAL at 21:10

## 2022-01-17 RX ADMIN — CARBIDOPA AND LEVODOPA 2 TABLET: 25; 100 TABLET, EXTENDED RELEASE ORAL at 21:10

## 2022-01-17 RX ADMIN — CARBIDOPA AND LEVODOPA 1.5 TABLET: 25; 100 TABLET ORAL at 19:22

## 2022-01-17 RX ADMIN — SODIUM CHLORIDE, PRESERVATIVE FREE 10 ML: 5 INJECTION INTRAVENOUS at 21:11

## 2022-01-17 RX ADMIN — QUETIAPINE FUMARATE 50 MG: 100 TABLET ORAL at 21:10

## 2022-01-17 RX ADMIN — CARBIDOPA AND LEVODOPA 1.5 TABLET: 25; 100 TABLET ORAL at 17:33

## 2022-01-17 NOTE — PROGRESS NOTES
Patient resting in bed. Assessment completed. IV running. Bed low and in locked position. Call light in reach.

## 2022-01-17 NOTE — PROGRESS NOTES
Chart reviewed by REAGAN FLORES for discharge planning. Patient resides at [address] [place] [address]. Patient recommended for STR. CM contacted [facility] to determine, if patient can transition to SNF upon discharge. VM left with Admissions, requesting call back. CM continues to follow plan of care.

## 2022-01-17 NOTE — PROGRESS NOTES
Hospitalist      NAME:  Hoa Kapadia   Age:  82 y.o.  :   1935   MRN:   [id number]  PCP: Kavya Castellon MD  Consulting MD:  Treatment Team: Attending Provider: Winnie Nixon MD; Consulting Provider: Ban Sewell MD; Hospitalist: Winnie Nixon MD; Care Manager: Tere Scott; Utilization Review: Julia Solares; Primary Nurse: Bulmaro Orr RN      HPI:   Patient is a 82 y.o. male who presented to the ED for cc AMS since yesterday. Hx of prostate cancer, CAD s/p CABG, HTN, HLD, parkinsons disease with associated psychosis. Vitlas - Stable but BP was elevated on arrival    Today, more confused and agitated at times, refused po meds, did not eat breakfast, in mittens.   New atrial flutter noted  Previously and now in paroxysmal afib    Objective:       Data Review:   Recent Results (from the past 24 hour(s))   EKG, 12 LEAD, SUBSEQUENT    Collection Time: 22  2:00 PM   Result Value Ref Range    Ventricular Rate 69 BPM    Atrial Rate 276 BPM    QRS Duration 144 ms    Q-T Interval 480 ms    QTC Calculation (Bezet) 514 ms    Calculated P Axis 93 degrees    Calculated R Axis -9 degrees    Calculated T Axis 19 degrees    Diagnosis       Atrial flutter with 4:1 A-V conduction  Right bundle branch block  Minimal voltage criteria for LVH, may be normal variant  Inferior infarct , age undetermined  Anterolateral infarct , age undetermined  Abnormal ECG  When compared with ECG of prior   Attrial flutter has replaced sinus rhythm    Confirmed by Licha Sotomayor MD, [facility] ([id number]) on 2022 5:21:24 PM     PLEASE READ & DOCUMENT PPD TEST IN 48 HRS    Collection Time: 22  3:02 PM   Result Value Ref Range    PPD Negative Negative    mm Induration 0 0 - 5 mm   MAGNESIUM    Collection Time: 22  3:41 PM   Result Value Ref Range    Magnesium 2.1 1.8 - 2.4 mg/dL   MAGNESIUM    Collection Time: 22  5:48 AM   Result Value Ref Range    Magnesium 2.1 1.8 - 2.4 mg/dL   METABOLIC PANEL, BASIC Collection Time: 01/17/22  5:48 AM   Result Value Ref Range    Sodium 144 138 - 145 mmol/L    Potassium 4.1 3.5 - 5.1 mmol/L    Chloride 109 (H) 98 - 107 mmol/L    CO2 26 21 - 32 mmol/L    Anion gap 9 7 - 16 mmol/L    Glucose 83 65 - 100 mg/dL    BUN 31 (H) 8 - 23 MG/DL    Creatinine 1.03 0.8 - 1.5 MG/DL    GFR est AA >60 >60 ml/min/1.73m2    GFR est non-AA >60 >60 ml/min/1.73m2    Calcium 9.5 8.3 - 10.4 MG/DL   ECHO ADULT COMPLETE    Collection Time: 01/17/22  8:45 AM   Result Value Ref Range    LV EDV A2C 166 mL    LV EDV A4C 155 mL    LV EDV  (A) 67 - 155 mL    LV ESV A2C 78 mL    LV ESV A4C 74 mL    IVSd 1.1 (A) 0.6 - 1.0 cm    LVIDd 5.4 4.2 - 5.9 cm    LVIDs 3.9 cm    LVOT Diameter 1.9 cm    LVOT Mean Gradient 2 mmHg    LVOT VTI 24.4 cm    LVOT Peak Velocity 1.1 m/s    LVOT Peak Gradient 5 mmHg    LVPWd 1.0 0.6 - 1.0 cm    LV E' Lateral Velocity 16 cm/s    LV E' Septal Velocity 7 cm/s    LV Ejection Fraction A2C 53 %    LV Ejection Fraction A4C 52 %    EF BP 53 (A) 55 - 100 %    LVOT Area 2.8 cm2    LVOT SV 69.1 ml    LA Minor Axis 5.6 cm    LA Major Ranger 6.2 cm    LA Area 2C 21.6 cm2    LA Area 4C 23.3 cm2    LA Volume BP 71 (A) 18 - 58 mL    LA Diameter 3.6 cm    AV Mean Velocity 1.6 m/s    AV Mean Gradient 13 mmHg    AV VTI 52.4 cm    AV Peak Velocity 2.5 m/s    AV Peak Gradient 25 mmHg    AV Area by VTI 1.3 cm2    AV Area by Peak Velocity 1.3 cm2    Aortic Root 3.4 cm    Ascending Aorta 3.4 cm    IVC Proxmal 2.0 cm    MV E Wave Deceleration Time 225.0 ms    MV A Velocity 0.58 m/s    MV E Velocity 0.80 m/s    TX Max Velocity 1.2 m/s    Pulmonary Artery EDP 6 mmHg    RVIDd 2.7 cm    RV Basal Dimension 4.8 cm    RV Mid Dimension 3.5 cm    TAPSE 1.7 1.5 - 2.0 cm    TR Max Velocity 3.07 m/s    TR Peak Gradient 38 mmHg    Fractional Shortening 2D 28 28 - 44 %    LV EDV Index BP 89 mL/m2    LV ESV Index A4C 40 mL/m2    LV EDV Index A4C 85 mL/m2    LV ESV Index A2C 43 mL/m2    LV EDV Index A2C 91 mL/m2 LVIDd Index 2.95 cm/m2    LVIDs Index 2.13 cm/m2    LV RWT Ratio 0.37     LV Mass 2D 220.6 88 - 224 g    LV Mass 2D Index 120.5 (A) 49 - 115 g/m2    MV E/A 1.38     E/E' Ratio (Averaged) 8.21     E/E' Lateral 5.00     E/E' Septal 11.43     LA Volume Index BP 39 (A) 16 - 34 ml/m2    LVOT Stroke Volume Index 37.8 mL/m2    LA Size Index 1.97 cm/m2    LA/AO Root Ratio 1.06     Ao Root Index 1.86 cm/m2    Ascending Aorta Index 1.86 cm/m2    AV Velocity Ratio 0.44     LVOT:AV VTI Index 0.47     SHEA/BSA VTI 0.7 cm2/m2    SHEA/BSA Peak Velocity 0.7 cm2/m2    Est. RA Pressure 3 mmHg    RVSP 41 mmHg       Physical Exam:     General:  Alert, disoriented, moderate distress, pale   Lungs:   Clear to auscultation bilaterally. Heart:   irreg irreg, S1, S2 normal   Abdomen:   Soft, non-tender. Bowel sounds normal. No masses,  No organomegaly. Extremities: Ecchymosis to left dorsal hand, limited ROM   Neurologic: . Perrla, moves 4 limbs, no rigidity     Assessment and Plan     1- Acute encephalopathy, per admission, hx of Parkinson's disease, waxing and weaning, followed by Neurology. 2- Possible community acquired pneumonia, bilateral, covid-19 screen negative, on room air, minimal symptoms if any  3- Hypernatremia, hypovolemic, mild  4- QT interval prolongation noted  5- HTN, not well controlled  6- Hx of BPH, prostate Ca and CABG. Negative UA. Stable  7- New atrial fibrillation, normal TSH and lytes, unremarkable echo for this matter. Chad2 score 2.     Rx:  Changed IV abx to narrower po  AM lab as needed  BP control  BBL  Eliquis started    Dispo: rehab when available, case d/w CM    Signed By: Jazz Hodges MD   January 17, 2022

## 2022-01-17 NOTE — PROGRESS NOTES
Neurology Daily Progress Note     Assessment:     82year old male with hx of PD with autonomic dysfunction who presented with delusions and hallucinations. He is known to neurology. Azilact is associated with AMS and hallucinations, this has been discontinued. Plan:     Continue Carbidopa-levodopa 25/100 mg  1.5 tablets every four times daily at 8am, 12pm, 4pm, 8pm. If unable to swallow, administer Parcopa 25/100 mg ODT. Continue Carbidopa-levodopa CR 50/200mg 1 tab Qhs     Continue Seroquel 50 mg at noon and 50mg at qhs. Continue Zonisamide 25 mg po every day (per Dr. Danilo Judge)    Nuplazid 34mg Q day. This is not hospital formulary, family will need to bring medication from home. If medications are not on formulary, have family bring in medications. Do not stop medications. Abrupt discontinuation/reduction of dopaminergic medications can be life threatening. If unable to swallow, give medications via NG tube. Avoid Geodon/Haldol    Continue to correct metabolic abnormalities. Management of Delirium     Non-pharmacological intervention  · Reorient the patient throughout the day  · Window open and lights on during the day. Lights off, television off, noises down during the night. If able, decrease nursing checks at night  · Therapies as often as possible  · Avoid restraints to the best of your ability   · Avoid sensory deprivation by using glasses and hearing aids, if applicable         Subjective: Interval history:    Oriented to self only. Pleasant and calm    History:    Cornelius Huynh is a 82 y.o. male who is being seen for PD with psychosis. Review of systems negative with exception of pertinent positives and negatives noted above.        Objective:     Vitals:    01/17/22 0020 01/17/22 0425 01/17/22 0920 01/17/22 1017   BP: (!) 93/58 (!) 163/95 (!) 158/89 (!) 163/95   Pulse: (!) 54 78 79    Resp: 20 20 18    Temp: 97.5 °F (36.4 °C) 97.4 °F (36.3 °C) 97.4 °F (36.3 °C) SpO2: 98% 100% 98%    Weight:    154 lb (69.9 kg)   Height:    5' 8\" (1.727 m)          Current Facility-Administered Medications:     metoprolol (LOPRESSOR) injection 5 mg, 5 mg, IntraVENous, Q6H PRN, David Hodges MD    metoprolol tartrate (LOPRESSOR) tablet 25 mg, 25 mg, Oral, BID, David Hodges MD, 25 mg at 01/17/22 6950    carbidopa-levodopa (PARCOPA)  mg rapid dissolve tablet 1.5 Tablet, 1.5 Tablet, Oral, PRN, Obdulio Cotterssica L, APRN    zonisamide (ZONEGRAN) capsule 25 mg, 25 mg, Oral, DAILY, Obdulio Cotterssica L, APRN, 25 mg at 01/17/22 4493    cefUROXime (CEFTIN) tablet 250 mg, 250 mg, Oral, Q12H, David Hodges MD, 250 mg at 01/17/22 3380    doxycycline (VIBRAMYCIN) capsule 100 mg, 100 mg, Oral, Q12H, David Hodges MD, 100 mg at 01/17/22 7777    cloNIDine HCL (CATAPRES) tablet 0.1 mg, 0.1 mg, Oral, BID PRN, David Hodges MD, 0.1 mg at 01/16/22 2113    influenza vaccine 2021-22 (6 mos+)(PF) (FLUARIX/FLULAVAL/FLUZONE QUAD) injection 0.5 mL, 1 Each, IntraMUSCular, PRIOR TO DISCHARGE, Jessica Lynch MD    QUEtiapine (SEROquel) tablet 50 mg, 50 mg, Oral, BID, Vahe, Sherita L, APRN, 50 mg at 01/16/22 2113    sodium chloride (NS) flush 5-40 mL, 5-40 mL, IntraVENous, Q8H, Debbi Brown DO, 10 mL at 01/16/22 2127    sodium chloride (NS) flush 5-40 mL, 5-40 mL, IntraVENous, PRN, Nini Cortes DO    acetaminophen (TYLENOL) tablet 650 mg, 650 mg, Oral, Q6H PRN **OR** acetaminophen (TYLENOL) suppository 650 mg, 650 mg, Rectal, Q6H PRN, Nini Cortes DO    polyethylene glycol (MIRALAX) packet 17 g, 17 g, Oral, DAILY PRN, Nini Cortes DO    ondansetron (ZOFRAN ODT) tablet 4 mg, 4 mg, Oral, Q8H PRN **OR** ondansetron (ZOFRAN) injection 4 mg, 4 mg, IntraVENous, Q6H PRN, Nini Cortes DO    aspirin delayed-release tablet 81 mg, 81 mg, Oral, DAILY, Debbi Brown, , 81 mg at 01/17/22 0903    carbidopa-levodopa ER (SINEMET CR)  mg per tablet 2 Tablet, 2 Tablet, Oral, Ayanna [place], DO, 2 Tablet at 01/16/22 2113    pimavanserin cap 34 mg (Nuplazid) pt supplied (Patient Supplied), 34 mg, Oral, DAILY, Gareld Crowe, DO, 34 mg at 01/16/22 1005    carbidopa-levodopa (SINEMET)  mg per tablet 1.5 Tablet, 1.5 Tablet, Oral, QID, Gareld Crowe, DO, 1.5 Tablet at 01/17/22 8642    Recent Results (from the past 12 hour(s))   MAGNESIUM    Collection Time: 01/17/22  5:48 AM   Result Value Ref Range    Magnesium 2.1 1.8 - 2.4 mg/dL   METABOLIC PANEL, BASIC    Collection Time: 01/17/22  5:48 AM   Result Value Ref Range    Sodium 144 138 - 145 mmol/L    Potassium 4.1 3.5 - 5.1 mmol/L    Chloride 109 (H) 98 - 107 mmol/L    CO2 26 21 - 32 mmol/L    Anion gap 9 7 - 16 mmol/L    Glucose 83 65 - 100 mg/dL    BUN 31 (H) 8 - 23 MG/DL    Creatinine 1.03 0.8 - 1.5 MG/DL    GFR est AA >60 >60 ml/min/1.73m2    GFR est non-AA >60 >60 ml/min/1.73m2    Calcium 9.5 8.3 - 10.4 MG/DL   ECHO ADULT COMPLETE    Collection Time: 01/17/22  8:45 AM   Result Value Ref Range    LV EDV A2C 166 mL    LV EDV A4C 155 mL    LV EDV  (A) 67 - 155 mL    LV ESV A2C 78 mL    LV ESV A4C 74 mL    IVSd 1.1 (A) 0.6 - 1.0 cm    LVIDd 5.4 4.2 - 5.9 cm    LVIDs 3.9 cm    LVOT Diameter 1.9 cm    LVOT Mean Gradient 2 mmHg    LVOT VTI 24.4 cm    LVOT Peak Velocity 1.1 m/s    LVOT Peak Gradient 5 mmHg    LVPWd 1.0 0.6 - 1.0 cm    LV E' Lateral Velocity 16 cm/s    LV E' Septal Velocity 7 cm/s    LV Ejection Fraction A2C 53 %    LV Ejection Fraction A4C 52 %    EF BP 53 (A) 55 - 100 %    LVOT Area 2.8 cm2    LVOT SV 69.1 ml    LA Minor Axis 5.6 cm    LA Major [place] 6.2 cm    LA Area 2C 21.6 cm2    LA Area 4C 23.3 cm2    LA Volume BP 71 (A) 18 - 58 mL    LA Diameter 3.6 cm    AV Mean Velocity 1.6 m/s    AV Mean Gradient 13 mmHg    AV VTI 52.4 cm    AV Peak Velocity 2.5 m/s    AV Peak Gradient 25 mmHg    AV Area by VTI 1.3 cm2    AV Area by Peak Velocity 1.3 cm2    Aortic Root 3.4 cm    Ascending Aorta 3.4 cm IVC Proxmal 2.0 cm    MV E Wave Deceleration Time 225.0 ms    MV A Velocity 0.58 m/s    MV E Velocity 0.80 m/s    ND Max Velocity 1.2 m/s    Pulmonary Artery EDP 6 mmHg    RVIDd 2.7 cm    RV Basal Dimension 4.8 cm    RV Mid Dimension 3.5 cm    TAPSE 1.7 1.5 - 2.0 cm    TR Max Velocity 3.07 m/s    TR Peak Gradient 38 mmHg    Fractional Shortening 2D 28 28 - 44 %    LV EDV Index BP 89 mL/m2    LV ESV Index A4C 40 mL/m2    LV EDV Index A4C 85 mL/m2    LV ESV Index A2C 43 mL/m2    LV EDV Index A2C 91 mL/m2    LVIDd Index 2.95 cm/m2    LVIDs Index 2.13 cm/m2    LV RWT Ratio 0.37     LV Mass 2D 220.6 88 - 224 g    LV Mass 2D Index 120.5 (A) 49 - 115 g/m2    MV E/A 1.38     E/E' Ratio (Averaged) 8.21     E/E' Lateral 5.00     E/E' Septal 11.43     LA Volume Index BP 39 (A) 16 - 34 ml/m2    LVOT Stroke Volume Index 37.8 mL/m2    LA Size Index 1.97 cm/m2    LA/AO Root Ratio 1.06     Ao Root Index 1.86 cm/m2    Ascending Aorta Index 1.86 cm/m2    AV Velocity Ratio 0.44     LVOT:AV VTI Index 0.47     SHEA/BSA VTI 0.7 cm2/m2    SHEA/BSA Peak Velocity 0.7 cm2/m2    Est. RA Pressure 3 mmHg    RVSP 41 mmHg       Physical Exam:  General - elderly , well nourished, in no apparent distress. Confused and disoriented  HEENT - Normocephalic, atraumatic. Conjunctiva are clear. Neck - Supple without masses  Cardiovascular - Regular rate and rhythm. Normal S1, S2 without murmurs, rubs, or gallops. Lungs - Clear to auscultation. Abdomen - Soft, nontender with normal bowel sounds. Extremities - Peripheral pulses intact. Ecchymosis to left hand. No edema and no rashes. Neurological examination - Oriented to self. Comprehension, attention, memory and reasoning are impaired. Language and speech are normal.   On cranial nerve examination, (II, III, IV, VI) pupils are equal, round, and reactive to light. Visual acuity is adequate. Visual fields are full to finger confrontation.  Extraocular motility is normal. (V, VII) Face is symmetric and sensation is intact to light touch. (VIII) Hearing is intact. (IX, X) Palate and uvula elevate symmetrically. Voice is hypophonic. (XI) Shoulder shrug is strong and equal bilaterally. (XII)Tongue is midline. Motor examination - cogwheeling. Power is full throughout, moves all extremities against gravity to command. Muscle stretch reflexes are1+ throughout. Plantar response is flexor. Unable to assess sensation, cerebellar examination or gait/stance due to AMS.        Signed By: Tala Serna DO     January 17, 2022

## 2022-01-17 NOTE — PROGRESS NOTES
Pt resting in bed. All needs met. Hourly rounding completed. Patient remains confused and intermittently combative. Bed alarm on in low & locked position. Side rails up x3. Call light within reach. Will continue to monitor & report to oncoming nurse.

## 2022-01-18 LAB
BACTERIA SPEC CULT: NORMAL
SERVICE CMNT-IMP: NORMAL

## 2022-01-18 PROCEDURE — 74011250637 HC RX REV CODE- 250/637: Performed by: HOSPITALIST

## 2022-01-18 PROCEDURE — 65270000029 HC RM PRIVATE

## 2022-01-18 PROCEDURE — 74011250637 HC RX REV CODE- 250/637: Performed by: FAMILY MEDICINE

## 2022-01-18 PROCEDURE — 65660000000 HC RM CCU STEPDOWN

## 2022-01-18 PROCEDURE — 76450000000

## 2022-01-18 PROCEDURE — 74011250637 HC RX REV CODE- 250/637: Performed by: NURSE PRACTITIONER

## 2022-01-18 PROCEDURE — 74011000250 HC RX REV CODE- 250: Performed by: FAMILY MEDICINE

## 2022-01-18 PROCEDURE — 74011250636 HC RX REV CODE- 250/636: Performed by: HOSPITALIST

## 2022-01-18 RX ORDER — METOPROLOL TARTRATE 50 MG/1
50 TABLET ORAL 2 TIMES DAILY
Status: DISCONTINUED | OUTPATIENT
Start: 2022-01-18 | End: 2022-01-20

## 2022-01-18 RX ORDER — SODIUM CHLORIDE 9 MG/ML
75 INJECTION, SOLUTION INTRAVENOUS CONTINUOUS
Status: DISPENSED | OUTPATIENT
Start: 2022-01-18 | End: 2022-01-19

## 2022-01-18 RX ADMIN — CARBIDOPA AND LEVODOPA 1.5 TABLET: 25; 100 TABLET ORAL at 17:37

## 2022-01-18 RX ADMIN — ZONISAMIDE 25 MG: 25 CAPSULE ORAL at 09:03

## 2022-01-18 RX ADMIN — SODIUM CHLORIDE, PRESERVATIVE FREE 10 ML: 5 INJECTION INTRAVENOUS at 21:01

## 2022-01-18 RX ADMIN — CARBIDOPA AND LEVODOPA 1.5 TABLET: 25; 100 TABLET ORAL at 09:03

## 2022-01-18 RX ADMIN — QUETIAPINE FUMARATE 50 MG: 100 TABLET ORAL at 20:59

## 2022-01-18 RX ADMIN — CARBIDOPA AND LEVODOPA 1.5 TABLET: 25; 100 TABLET ORAL at 21:00

## 2022-01-18 RX ADMIN — CARBIDOPA AND LEVODOPA 1.5 TABLET: 25; 100 TABLET ORAL at 12:07

## 2022-01-18 RX ADMIN — APIXABAN 5 MG: 5 TABLET, FILM COATED ORAL at 20:57

## 2022-01-18 RX ADMIN — APIXABAN 5 MG: 5 TABLET, FILM COATED ORAL at 09:03

## 2022-01-18 RX ADMIN — QUETIAPINE FUMARATE 50 MG: 100 TABLET ORAL at 12:07

## 2022-01-18 RX ADMIN — SODIUM CHLORIDE, PRESERVATIVE FREE 10 ML: 5 INJECTION INTRAVENOUS at 05:04

## 2022-01-18 RX ADMIN — METOPROLOL TARTRATE 50 MG: 50 TABLET, FILM COATED ORAL at 17:37

## 2022-01-18 RX ADMIN — METOPROLOL TARTRATE 25 MG: 25 TABLET, FILM COATED ORAL at 09:03

## 2022-01-18 RX ADMIN — CARBIDOPA AND LEVODOPA 2 TABLET: 25; 100 TABLET, EXTENDED RELEASE ORAL at 20:59

## 2022-01-18 RX ADMIN — DOXYCYCLINE HYCLATE 100 MG: 100 CAPSULE ORAL at 09:03

## 2022-01-18 RX ADMIN — SODIUM CHLORIDE, PRESERVATIVE FREE 10 ML: 5 INJECTION INTRAVENOUS at 14:38

## 2022-01-18 RX ADMIN — CEFUROXIME AXETIL 250 MG: 250 TABLET ORAL at 09:03

## 2022-01-18 RX ADMIN — SODIUM CHLORIDE 75 ML/HR: 900 INJECTION, SOLUTION INTRAVENOUS at 10:00

## 2022-01-18 NOTE — PROGRESS NOTES
Problem: Pressure Injury - Risk of  Goal: *Prevention of pressure injury  Description: Document Volodymyr Scale and appropriate interventions in the flowsheet. Outcome: Progressing Towards Goal  Note: Pressure Injury Interventions:  Sensory Interventions: Assess need for specialty bed,Keep linens dry and wrinkle-free    Moisture Interventions: Absorbent underpads,Apply protective barrier, creams and emollients    Activity Interventions: Assess need for specialty bed    Mobility Interventions: Assess need for specialty bed    Nutrition Interventions: Document food/fluid/supplement intake    Friction and Shear Interventions: HOB 30 degrees or less,Minimize layers                Problem: Patient Education: Go to Patient Education Activity  Goal: Patient/Family Education  Outcome: Progressing Towards Goal     Problem: Falls - Risk of  Goal: *Absence of Falls  Description: Document Marlin Fall Risk and appropriate interventions in the flowsheet.   Outcome: Progressing Towards Goal  Note: Fall Risk Interventions:  Mobility Interventions: Bed/chair exit alarm    Mentation Interventions: Bed/chair exit alarm,Door open when patient unattended    Medication Interventions: Bed/chair exit alarm    Elimination Interventions: Call light in reach,Bed/chair exit alarm    History of Falls Interventions: Bed/chair exit alarm,Investigate reason for fall         Problem: Patient Education: Go to Patient Education Activity  Goal: Patient/Family Education  Outcome: Progressing Towards Goal     Problem: Patient Education: Go to Patient Education Activity  Goal: Patient/Family Education  Outcome: Progressing Towards Goal

## 2022-01-18 NOTE — PROGRESS NOTES
Hospitalist      NAME:  Wilbur Burns   Age:  82 y.o.  :   1935   MRN:   [id number]  PCP: Muriel Wilson MD  Consulting MD:  Treatment Team: Attending Provider: Rhona Sanchez MD; Hospitalist: Rhona Sanchez MD; Care Manager: Dhruv Ga; Utilization Review: Arnulfo Rain; Primary Nurse: Lizandro Valladares RN      HPI:   Patient is a 82 y.o. male who presented to the ED for cc AMS since yesterday. Hx of prostate cancer, CAD s/p CABG, HTN, HLD, parkinsons disease with associated psychosis. Vitlas - Stable but BP was elevated on arrival    Today, more confused and agitated at times again, in [facility]    Objective:       Data Review:   No results found for this or any previous visit (from the past 24 hour(s)). Physical Exam:     General:  Alert, disoriented, moderate distress, pale   Lungs:   Clear to auscultation bilaterally. Heart:   irreg irreg, S1, S2 normal   Abdomen:   Soft, non-tender. Bowel sounds normal. No masses,  No organomegaly. Extremities: Ecchymosis to left dorsal hand, limited ROM   Neurologic: . Perrla, moves 4 limbs, no rigidity     Assessment and Plan     1- Acute encephalopathy, per admission, hx of Parkinson's disease, waxing and weaning, followed by Neurology. Patient has advanced parkinson dz and appears to have poor prognosis. 2- Possible community acquired pneumonia, bilateral, covid-19 screen negative, on room air, minimal symptoms if any  3- Hypernatremia, hypovolemic, mild  4- QT interval prolongation noted  5- HTN, not well controlled  6- Hx of BPH, prostate Ca and CABG. Negative UA. Stable  7- New atrial fibrillation, converted spontaneously to NSR, normal TSH and lytes, unremarkable echo for this matter. Chad2 score 2. Rx:  Changed IV abx to narrower po  AM lab as needed  BP control  BBL  Eliquis started  Palliative care consulted    Dispo: rehab when available although doubt pt will be able to contribute or benefit from it, case d/w CM daily.  Palliative care consulted, to consider hospice.     Signed By: Fanny Ferrer MD   January 18, 2022

## 2022-01-18 NOTE — PROGRESS NOTES
Chart reviewed by REAGAN FLORES for discharge planning. Plan of care discussed during IDR. Palliative Care Consulted to consider Hospice. CM to follow up with patient and family regarding discharge planning, after goals of care have been discussed. CM continues to follow.

## 2022-01-19 LAB
BACTERIA SPEC CULT: NORMAL
SARS-COV-2, COV2: NORMAL
SERVICE CMNT-IMP: NORMAL

## 2022-01-19 PROCEDURE — 74011000250 HC RX REV CODE- 250: Performed by: FAMILY MEDICINE

## 2022-01-19 PROCEDURE — 99223 1ST HOSP IP/OBS HIGH 75: CPT | Performed by: NURSE PRACTITIONER

## 2022-01-19 PROCEDURE — 65660000000 HC RM CCU STEPDOWN

## 2022-01-19 PROCEDURE — 74011250637 HC RX REV CODE- 250/637: Performed by: NURSE PRACTITIONER

## 2022-01-19 PROCEDURE — 97530 THERAPEUTIC ACTIVITIES: CPT

## 2022-01-19 PROCEDURE — 74011250637 HC RX REV CODE- 250/637: Performed by: FAMILY MEDICINE

## 2022-01-19 PROCEDURE — 74011250636 HC RX REV CODE- 250/636: Performed by: HOSPITALIST

## 2022-01-19 PROCEDURE — 74011250637 HC RX REV CODE- 250/637: Performed by: HOSPITALIST

## 2022-01-19 PROCEDURE — 2709999900 HC NON-CHARGEABLE SUPPLY

## 2022-01-19 PROCEDURE — 97535 SELF CARE MNGMENT TRAINING: CPT

## 2022-01-19 PROCEDURE — 65270000029 HC RM PRIVATE

## 2022-01-19 PROCEDURE — U0005 INFEC AGEN DETEC AMPLI PROBE: HCPCS

## 2022-01-19 RX ORDER — LISINOPRIL 20 MG/1
20 TABLET ORAL DAILY
Status: DISCONTINUED | OUTPATIENT
Start: 2022-01-19 | End: 2022-01-21

## 2022-01-19 RX ORDER — SODIUM CHLORIDE 9 MG/ML
50 INJECTION, SOLUTION INTRAVENOUS CONTINUOUS
Status: DISPENSED | OUTPATIENT
Start: 2022-01-19 | End: 2022-01-20

## 2022-01-19 RX ADMIN — SODIUM CHLORIDE 50 ML/HR: 900 INJECTION, SOLUTION INTRAVENOUS at 22:46

## 2022-01-19 RX ADMIN — SODIUM CHLORIDE 75 ML/HR: 900 INJECTION, SOLUTION INTRAVENOUS at 01:04

## 2022-01-19 RX ADMIN — CARBIDOPA AND LEVODOPA 1.5 TABLET: 25; 100 TABLET ORAL at 12:50

## 2022-01-19 RX ADMIN — CARBIDOPA AND LEVODOPA 1.5 TABLET: 25; 100 TABLET ORAL at 20:42

## 2022-01-19 RX ADMIN — APIXABAN 5 MG: 5 TABLET, FILM COATED ORAL at 20:43

## 2022-01-19 RX ADMIN — SODIUM CHLORIDE, PRESERVATIVE FREE 10 ML: 5 INJECTION INTRAVENOUS at 05:08

## 2022-01-19 RX ADMIN — METOPROLOL TARTRATE 50 MG: 50 TABLET, FILM COATED ORAL at 09:04

## 2022-01-19 RX ADMIN — APIXABAN 5 MG: 5 TABLET, FILM COATED ORAL at 09:04

## 2022-01-19 RX ADMIN — METOPROLOL TARTRATE 50 MG: 50 TABLET, FILM COATED ORAL at 18:01

## 2022-01-19 RX ADMIN — ZONISAMIDE 25 MG: 25 CAPSULE ORAL at 09:04

## 2022-01-19 RX ADMIN — SODIUM CHLORIDE, PRESERVATIVE FREE 10 ML: 5 INJECTION INTRAVENOUS at 14:45

## 2022-01-19 RX ADMIN — SODIUM CHLORIDE 75 ML/HR: 900 INJECTION, SOLUTION INTRAVENOUS at 09:09

## 2022-01-19 RX ADMIN — QUETIAPINE FUMARATE 50 MG: 100 TABLET ORAL at 20:43

## 2022-01-19 RX ADMIN — QUETIAPINE FUMARATE 50 MG: 100 TABLET ORAL at 12:51

## 2022-01-19 RX ADMIN — CARBIDOPA AND LEVODOPA 1.5 TABLET: 25; 100 TABLET ORAL at 09:04

## 2022-01-19 RX ADMIN — CARBIDOPA AND LEVODOPA 2 TABLET: 25; 100 TABLET, EXTENDED RELEASE ORAL at 21:27

## 2022-01-19 RX ADMIN — CARBIDOPA AND LEVODOPA 1.5 TABLET: 25; 100 TABLET ORAL at 18:01

## 2022-01-19 RX ADMIN — SODIUM CHLORIDE, PRESERVATIVE FREE 10 ML: 5 INJECTION INTRAVENOUS at 21:28

## 2022-01-19 RX ADMIN — LISINOPRIL 20 MG: 20 TABLET ORAL at 12:50

## 2022-01-19 NOTE — CONSULTS
Palliative Care    Patient: Cornelius Huynh MRN: [id number]  SSN: [id number]    YOB: 1935  Age: 82 y.o. Sex: male       Date of Request: 1/18/2022  Date of Consult:  1/19/2022  Reason for Consult:  goals of care  Requesting Physician: Dr Ruth Cheung     Assessment/Plan:     Principal Diagnosis:    Dementia  F03.90     Additional Diagnoses:   · Advance Care Planning Counseling Z71.89  · Debility, Unspecified  R53.81  · Fatigue, Lethargy  R53.83  · Frailty  R54  · Counseling, Encounter for Medical Advice  Z71.9  · Encounter for Palliative Care  Z51.5    Palliative Performance Scale (PPS):       Medical Decision Making:   Reviewed and summarized notes from admission to present   Discussed case with appropriate providers  Reviewed laboratory and x-ray data from admission to present     Pt resting in bed, no distress noted. He is states he is in the hospital, but cannot tell me where or why. He is also confused about the year. Pt denies pain. He spent the majority of the visit talking about a bill for the TV we made him buy. Attempted to reassure him. I spoke with his wife, Goldie, via phone. Introduced role of PC and reviewed events. Goldie has very good understanding of pt's condition. She asked me to be very maurisio about his prognosis, and whether I think his time is short. Counseled that his overall condition does have a poor prognosis, especially if he continues to have a poor appetite and/or aspiration. We discussed hospice as an option, and she is receptive to this. She would like for him to try STR first- this does not seem completely unreasonable, as he was able to work with PT today, with some improvement noted. Counseled that if pt did not tolerate STR, or declined further, hospice could be initiated at that point. She voiced understanding. Goldie is 82, and states her health is not great, and she is unsure if she will be able to care for him alone.   Maribell Del Cid should be able to assist with transitioning to higher level of care, if needed. We also discussed code status. Goldie states pt has a Living Will, and should be a DNR. Order placed per her request.  Goldie voiced appreciation for pt's care. No further PC needs- we will not plan to follow. Will discuss findings with members of the interdisciplinary team.      Thank you for this referral.          .    Subjective:     History obtained from:  Patient, Family and Chart    Chief Complaint: AMS  History of Present Illness:  Mr Raghu Kaiser is an 81 yo male with PMH of prostate CA, CAD, HTN, HLD, Parkinson's disease, Dementia, and other conditions listed below, who presented to the ER from [facility] on 1/13/2022 with worsening confusion/mental status for several months, but progressively over the past few days. Per wife, pt was very aggressive, paranoid, and agitated. Wife denied fevers, n/v/d, cough. Work up in the ED revealed metabolic encephalopathy, and pt was admitted for further management. Pt was treated for possible CAP, as well as hypernatremia, HTN, and new onset atrial fibrillation. He has had ongoing confusion/agitation. Advance Directive: Yes       Code Status:  Full [address] of : Yes - Patient states he has a [address]; spouse/significant other to bring copy.     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 Mohamud Villdea)     Cognitive changes 7/2/2018    Colon polyp 7/26/2016    Overview:   (f/u in 2012)     Diplopia 7/26/2016    (Dr. Jeannette Brand and Dr. Bharati Vizcaino)     Diverticulosis     Diverticulosis     Elevated prostate specific antigen (PSA) 11/27/2013    History of BPH     HTN (hypertension) 11/27/2013    Hypercholesteraemia     Hypercholesteremia 11/27/2013    Hyperlipidemia 2016    Hypertension     contrplled by medications    Lacunar infarct, acute ([facility] 2016    lacunar infarct with 6 CN palsy (post CABG)- Dr. Rizwana traore     Malignant neoplasm of prostate ([facility] 2010    Overview:  (Dr. Telly Hardy)     Nodular prostate without urinary obstruction 2013    Parkinson's disease ([facility] 2016    followed by Dr. Liana Ewing Pneumonia     Prostate cancer [facility])     Pulmonary HTN ([facility]     Sebaceous cyst 2013    Skin cancer     Slow transit constipation 2018    Stroke ([facility] [address]     tia that affected eye    Thromboembolus ([facility]     pulmonary embolus    Unspecified adverse effect of anesthesia     pt states that he has HX of being sensative  to some anesthesias    Vitamin D deficiency       Past Surgical History:   Procedure Laterality Date    COLONOSCOPY N/A 5/3/2019    COLONOSCOPY/ 27 performed by Gaurang Riddle MD at [address] Right 2014    HX CATARACT REMOVAL Left     HX COLONOSCOPY  2013    (Dr. John Johnson)    HX CORONARY ARTERY BYPASS GRAFT  2011    CABG x7 (Dr. Danielito Tejeda)    HX CYST REMOVAL  2012    cyst excision    HX HEENT      pt had a hot needle to soft palate to help snoring    HX OTHER SURGICAL Right     retina sx     HX OTHER SURGICAL      Bilateral Vats Ablation    HX PROSTATECTOMY      ROBOTIC    HX UROLOGICAL  ?     circumcision    HX UROLOGICAL  2017    PROSTATE BIOPSY    MO CARDIAC SURG PROCEDURE UNLIST      cabg      Family History   Problem Relation Age of Onset    Lung Disease Mother         copd    Cancer Sister     Cancer Sister     Hypertension Other         GEN FAM HX       Social History     Tobacco Use    Smoking status: Former Smoker     Packs/day: 1.00     Years: 22.00     Pack years: 22.00     Quit date:      Years since quittin.0    Smokeless tobacco: Never Used    Tobacco comment: 1980   Substance Use Topics    Alcohol use: Yes     Alcohol/week: 0.8 standard drinks     Types: 1 Glasses of wine per week     Comment: daily at dinner     Prior to Admission medications    Medication Sig Start Date End Date Taking? Authorizing Provider   donepeziL (ARICEPT) 10 mg tablet TAKE 1 TABLET BY MOUTH EVERY DAY 1/5/22  Yes Emi Small MD   pimavanserin (Nuplazid) 34 mg cap Take 1 Capsule by mouth daily. 1/4/22  Yes Emi Small MD   QUEtiapine (SEROquel) 25 mg tablet Take 1 tablet in the afternoon and 2 tablets in the evening. Patient taking differently: 50 mg. Take 1 tablet in the afternoon and 2 tablets in the evening. 12/29/21  Yes Beth Pisano MD   carbidopa-levodopa ER (SINEMET CR)  mg per tablet TAKE 2 TABLETs BY MOUTH NIGHTLY  Patient taking differently: 1 Tablet. TAKE 2 TABLETs BY MOUTH NIGHTLY 9/28/21  Yes Emi Small MD   carbidopa-levodopa (SINEMET)  mg per tablet TAKE 1 & 1/2 (ONE & ONE-HALF) TABLETS BY MOUTH 4 TIMES DAILY AT [facility], 4PM AND 8PM. 4/29/21  Yes Emi Small MD   rasagiline (AZILECT) 1 mg tablet TAKE 1 TABLET BY MOUTH ONCE DAILY 4/26/21  Yes Medhat Jules MD   cloNIDine HCL (CATAPRES) 0.1 mg tablet Take 0.1 mg by mouth two (2) times a day. Yes Provider, Historical   cholecalciferol (VITAMIN D3) 1,000 unit cap Take  by mouth daily. Yes Provider, Historical   atorvastatin (LIPITOR) 80 mg tablet Take 80 mg by mouth nightly. Yes Provider, Historical   Aspirin, Buffered 81 mg tab Take  by mouth. Yes Provider, Historical   OTHER PT for postural imbalance and posture  SPeech therapy for hypophonia 1/4/22   Emi Small MD   nitroglycerin (NITROSTAT) 0.4 mg SL tablet by SubLINGual route every five (5) minutes as needed for Chest Pain.     Provider, Historical       Allergies   Allergen Reactions    Adhesive Tape Rash    Geodon [Ziprasidone Hcl] Other (comments)     Contraindicated with parkinsons        Review of Systems:  Review of systems not obtained due to patient factors- AMS      Objective:     Visit Vitals  BP (!) 185/89 (BP 1 Location: Left upper arm, BP Patient Position: At rest)   Pulse 67   Temp 98 °F (36.7 °C)   Resp 16   Ht 5' 8\" (1.727 m)   Wt 150 lb 12.7 oz (68.4 kg)   SpO2 97%   BMI 22.93 kg/m²        Physical Exam:    General:  Calm. Debilitated and frail. No acute distress. Eyes:  Conjunctivae/corneas clear    Nose: Nares normal. Septum midline. Neck: Supple, symmetrical, trachea midline   Lungs:   Clear to auscultation bilaterally, unlabored   Heart:  Irregular rate and rhythm   Abdomen:   Soft, non-tender, non-distended   Extremities: Normal, atraumatic, no cyanosis or edema   Skin: Skin color, texture, turgor normal.    Neurologic: Nonfocal   Psych: Alert and confused      Assessment:     Hospital Problems  Date Reviewed: 1/4/2022          Codes Class Noted POA    Acute encephalopathy ICD-10-CM: G93.40  ICD-9-CM: 348.30  1/15/2022 Unknown        * (Principal) Metabolic encephalopathy EPS-01-: G93.41  ICD-9-CM: 348.31  1/13/2022 Unknown        Bacterial pneumonia ICD-10-CM: J15.9  ICD-9-CM: 482.9  1/13/2022 Unknown        Parkinson disease ([facility] ICD-10-CM: G20  ICD-9-CM: 332.0  8/24/2016 Unknown        Tremor ICD-10-CM: R25.1  ICD-9-CM: 781.0  8/24/2016 Yes        Hyperlipidemia ICD-10-CM: E78.5  ICD-9-CM: 272.4  7/26/2016 Yes        Parkinson's disease ([facility] ICD-10-CM: G20  ICD-9-CM: 332.0  7/26/2016 Yes    Overview Signed 7/26/2016  1:54 PM by Alex COLLADO     Overview:   (Dr. Ave Ahumada)    Last Assessment & Plan:   States that he received a letter that his neurologist, Dr. Gracie Austin has moved.   Will arrange f/u appt with a different neurologist.               HTN (hypertension) ICD-10-CM: I10  ICD-9-CM: 401.9  11/27/2013 Yes        Malignant neoplasm of prostate ([facility] ICD-10-CM: C61  ICD-9-CM: 185  9/13/2010 Yes    Overview Signed 7/26/2016  1:54 PM by Di Pardo     Overview:   (Dr. Linda Sin)                   Signed By: Rolf Rodriguez NP     January 19, 2022

## 2022-01-19 NOTE — PROGRESS NOTES
Problem: Pressure Injury - Risk of  Goal: *Prevention of pressure injury  Description: Document Volodymyr Scale and appropriate interventions in the flowsheet. Outcome: Progressing Towards Goal  Note: Pressure Injury Interventions:  Sensory Interventions: Assess changes in LOC,Avoid rigorous massage over bony prominences,Check visual cues for pain,Discuss PT/OT consult with provider,Float heels,Keep linens dry and wrinkle-free,Maintain/enhance activity level,Minimize linen layers,Monitor skin under medical devices,Pad between skin to skin,Turn and reposition approx. every two hours (pillows and wedges if needed),Pressure redistribution bed/mattress (bed type)    Moisture Interventions: Absorbent underpads,Apply protective barrier, creams and emollients,Assess need for specialty bed,Check for incontinence Q2 hours and as needed,Limit adult briefs,Maintain skin hydration (lotion/cream),Minimize layers,Moisture barrier    Activity Interventions: Assess need for specialty bed,Increase time out of bed,Pressure redistribution bed/mattress(bed type),PT/OT evaluation    Mobility Interventions: Assess need for specialty bed,Float heels,HOB 30 degrees or less,Pressure redistribution bed/mattress (bed type),PT/OT evaluation,Turn and reposition approx. every two hours(pillow and wedges)    Nutrition Interventions: Document food/fluid/supplement intake,Offer support with meals,snacks and hydration    Friction and Shear Interventions: Apply protective barrier, creams and emollients,Foam dressings/transparent film/skin sealants,HOB 30 degrees or less,Lift sheet,Lift team/patient mobility team,Minimize layers                Problem: Patient Education: Go to Patient Education Activity  Goal: Patient/Family Education  Outcome: Progressing Towards Goal     Problem: Falls - Risk of  Goal: *Absence of Falls  Description: Document Marlin Fall Risk and appropriate interventions in the flowsheet.   Outcome: Progressing Towards Goal  Note: Fall Risk Interventions:  Mobility Interventions: Communicate number of staff needed for ambulation/transfer,Bed/chair exit alarm,OT consult for ADLs,Patient to call before getting OOB,PT Consult for mobility concerns,PT Consult for assist device competence,Utilize walker, cane, or other assistive device    Mentation Interventions: Adequate sleep, hydration, pain control,Bed/chair exit alarm,Door open when patient unattended,Evaluate medications/consider consulting pharmacy,Eyeglasses and hearing aids,More frequent rounding,Reorient patient,Room close to nurse's station,Toileting rounds,Update white board    Medication Interventions: Bed/chair exit alarm,Evaluate medications/consider consulting pharmacy,Patient to call before getting OOB,Teach patient to arise slowly    Elimination Interventions: Bed/chair exit alarm,Call light in reach,Patient to call for help with toileting needs,Stay With Me (per policy),Toilet paper/wipes in reach,Toileting schedule/hourly rounds    History of Falls Interventions: Bed/chair exit alarm,Consult care management for discharge planning,Door open when patient unattended,Evaluate medications/consider consulting pharmacy         Problem: Patient Education: Go to Patient Education Activity  Goal: Patient/Family Education  Outcome: Progressing Towards Goal     Problem: Patient Education: Go to Patient Education Activity  Goal: Patient/Family Education  Outcome: Progressing Towards Goal

## 2022-01-19 NOTE — PROGRESS NOTES
Hospitalist      NAME:  Loy Barksdale   Age:  82 y.o.  :   1935   MRN:   [id number]  PCP: Mark Ye MD  Consulting MD:  Treatment Team: Attending Provider: Nubia Chnag MD; Hospitalist: Nubia Chang MD; Care Manager: Elio Gonsalez; Utilization Review: Khalida Russell; Primary Nurse: Puneet Flores, RN; Occupational Therapy Assistant: Hossein Desai      HPI:   Patient is a 82 y.o. male who presented to the ED for cc AMS since yesterday. Hx of prostate cancer, CAD s/p CABG, HTN, HLD, parkinsons disease with associated psychosis. Vitlas - Stable but BP was elevated on arrival    Today, mittens off, able to be fed, no ac events    Objective:       Data Review:   No results found for this or any previous visit (from the past 24 hour(s)). Physical Exam:     General:  Alert, moderate distress, pale   Lungs:   Clear to auscultation bilaterally. Heart:   irreg irreg, S1, S2 normal   Abdomen:   Soft, non-tender. Bowel sounds normal. No masses,  No organomegaly. Extremities: Ecchymosis to left dorsal hand, limited ROM   Neurologic: . Perrla, moves 4 limbs, no rigidity     Assessment and Plan     1- Acute encephalopathy, per admission, hx of Parkinson's disease, waxing and weaning, followed by Neurology. Patient has advanced parkinson dz and appears to have poor prognosis. 2- Possible community acquired pneumonia, bilateral, covid-19 screen negative, on room air, minimal symptoms if any, treated with abx  3- Hypernatremia, hypovolemic, mild  4- QT interval prolongation noted  5- HTN, not well controlled  6- Hx of BPH, prostate Ca and CABG. Negative UA. Stable  7- New atrial fibrillation, converted spontaneously to NSR, normal TSH and lytes, unremarkable echo for this matter. Chad2 score 2. Echo shows diastolic dysfunction.     Rx:  AM lab as needed  BP control  BBL  Eliquis started    Dispo: REHAB TOMORROW, NEED TO LEAVE  AM  Palliative care saw pt and Hospice will ne considered if do not improve in rehab per conversation with wife.     Signed By: Regino Lopez MD   January 19, 2022

## 2022-01-19 NOTE — PROGRESS NOTES
[facility] scheduled 1/20 @0900. Patient's spouse notified of anticipated discharge. CM remains available.

## 2022-01-19 NOTE — PROGRESS NOTES
Pt resting in bed. All needs met. Hourly rounding completed. Bed alarm on in low & locked position. Side rails up x3. Call light within reach. Will continue to monitor & report to oncoming nurse.

## 2022-01-19 NOTE — PROGRESS NOTES
Syncopal like episode reported to Dr Christine Perdomo via [employer]. Pt's vitals stable with only outlier being heart rate 48. Request order to place pt on tele for monitoring. Tele placed.

## 2022-01-19 NOTE — PROGRESS NOTES
ACUTE OT GOALS:  (Developed with and agreed upon by patient and/or caregiver.)  1. Pt will toilet with min a   2. Pt will complete functional mobility for ADLs with min A using AD as needed  3. Pt will complete lower body dressing with min A using AE as needed  4. Pt will complete grooming and hygiene with set up  5. Pt will demonstrate independence with HEP to promote increased BUE strength and functional use for ADLs  6. Pt will complete UB bathing/ dressing with min A     OCCUPATIONAL THERAPY: Daily Note OT Treatment Day # 2    Artemio Avendaño is a 82 y.o. male   PRIMARY DIAGNOSIS: Metabolic encephalopathy  Metabolic encephalopathy [L02.34]  Bacterial pneumonia [J15.9]  Parkinson disease ([facility] [address] [G20]  Acute encephalopathy [G93.40]       [place] / [place] / Product Type: Medicare /   ASSESSMENT:     REHAB RECOMMENDATIONS: CURRENT LEVEL OF FUNCTION:  (Most Recently Demonstrated)   Recommendation to date pending progress:  Setting:   Short-term Rehab  Equipment:    None Bathing:   Not tested  Dressing:   Not tested  Feeding/Grooming:   Not tested  Toileting:   Not tested  Functional Mobility:   Minimal Assistance- bed mobility      ASSESSMENT:  Mr. Bakari Lott was supine in bed upon arrival. Pt completed bed mobility with min A. Pt sat EOB for 5 minutes. Pt became non responsive. Pt was returned to supine with max A. RN came in and took vital signs. Pt became alert in supine. Continue POC.       SUBJECTIVE:   Mr. Bakari Lott states, \"Hey\"    SOCIAL HISTORY/LIVING ENVIRONMENT:   Home Environment: Patient room  One/Two Story Residence: One story  Living Alone: No  Support Systems: Spouse/Significant Other,Child(russell)    OBJECTIVE:     PAIN: VITAL SIGNS: LINES/DRAINS:   Pre Treatment: Pain Screen  Pain Scale 1: Numeric (0 - 10)  Pain Intensity 1: 0  Post Treatment: 0   IV  O2 Device: None (Room air)     ACTIVITIES OF DAILY LIVING: I Mod I S SBA CGA Min Mod Max Total NT Comments   BASIC ADLs:              Bathing/ Showering [] [] [] [] [] [] [] [] [] [x]    Toileting [] [] [] [] [] [] [] [] [] [x]    Dressing [] [] [] [] [] [] [] [] [] [x]    Feeding [] [] [] [] [] [] [] [] [] [x]    Grooming [] [] [] [] [] [] [] [] [] [x]    Personal Device Care [] [] [] [] [] [] [] [] [] [x]    Functional Mobility [] [] [] [] [] [] [] [] [] [x]    I=Independent, Mod I=Modified Independent, S=Supervision, SBA=Standby Assistance, CGA=Contact Guard Assistance,   Min=Minimal Assistance, Mod=Moderate Assistance, Max=Maximal Assistance, Total=Total Assistance, NT=Not Tested    MOBILITY: I Mod I S SBA CGA Min Mod Max Total  NT x2 Comments:   Supine to sit [] [] [] [] [] [x] [] [] [] [] []    Sit to supine [] [] [] [] [] [] [] [x] [] [] []    Sit to stand [] [] [] [] [] [] [] [] [] [x] []    Bed to chair [] [] [] [] [] [] [] [] [] [x] []    I=Independent, Mod I=Modified Independent, S=Supervision, SBA=Standby Assistance, CGA=Contact Guard Assistance,   Min=Minimal Assistance, Mod=Moderate Assistance, Max=Maximal Assistance, Total=Total Assistance, NT=Not Tested    BALANCE: Good Fair+ Fair Fair- Poor NT Comments   Sitting Static [] [x] [] [] [] []    Sitting Dynamic [] [] [] [] [] []              Standing Static [] [] [] [] [] [x]    Standing Dynamic [] [] [] [] [] [x]      PLAN:   FREQUENCY/DURATION: OT Plan of Care: 3 times/week for duration of hospital stay or until stated goals are met, whichever comes first.    TREATMENT:   TREATMENT:   ($$ Self Care/Home Management: 23-37 mins    )  Therapeutic Activity (23 Minutes): Therapeutic activity included Rolling, Supine to Sit and Sit to Supine to improve functional Mobility and Strength.     TREATMENT GRID:  N/A    AFTER TREATMENT POSITION/PRECAUTIONS:  Bed, Needs within reach, RN notified and lap belt     INTERDISCIPLINARY COLLABORATION:  RN/PCT and OT/CAMPOVERDE    TOTAL TREATMENT DURATION:  OT Patient Time In/Time Out  Time In: 1400  Time Out: Yvonne Metzger, CAMPOVERDE

## 2022-01-19 NOTE — PROGRESS NOTES
ACUTE PHYSICAL THERAPY GOALS:  (Developed with and agreed upon by patient and/or caregiver.)  (1.) Jewel Rios  will move from supine to sit and sit to supine  with STAND BY ASSIST within 3 treatment day(s). (2.) Jewel Rios will transfer from bed to chair and chair to bed with CONTACT GUARD ASSIST using the least restrictive device within 3 treatment day(s). (3.) Jewel Rios will ambulate with CONTACT GUARD ASSIST for 10 feet with the least restrictive device within 3 treatment day(s). (4.) Jewel Rios will perform standing static and dynamic balance activities x 10+ minutes with CONTACT GUARD ASSIST to improve safety within 3 treatment day(s). (5.) Jewel Rios will improved seated balance to stand by assist to improve functional mobility and safety within 3 treatment day(s).         PHYSICAL THERAPY: Daily Note and AM Treatment Day # 2    Jewel Rios is a 82 y.o. male   PRIMARY DIAGNOSIS: Metabolic encephalopathy  Metabolic encephalopathy [U15.29]  Bacterial pneumonia [J15.9]  Parkinson disease ([facility] [G20]  Acute encephalopathy [G93.40]         ASSESSMENT:     REHAB RECOMMENDATIONS: CURRENT LEVEL OF FUNCTION:  (Most Recently Demonstrated)   Recommendation to date pending progress:  Setting:   Short-term Rehab  Equipment:    To Be Determined Bed Mobility:   Minimal Assistance x 2  Sit to Stand:   Minimal Assistance x 2  Transfers:   Minimal Assistance x 2  Gait/Mobility:   Minimal Assistance x 2     ASSESSMENT:  Mr. Berenice Perkins is making slow progress towards PT goals. Patient needed min assist x 2 for all mobility today including supine <-> sit, transfers, and ambulation x 15' with rolling walker. Poor standing balance noted. Will continue efforts.      SUBJECTIVE:   Mr. Berenice Perkins states, \"I have a disease called Parkinson's\"    SOCIAL HISTORY/ LIVING ENVIRONMENT: See initial evaluation  Home Environment: Patient room  One/Two Story Residence: One story  Living Alone: No  Support Systems: Spouse/Significant Other,Child(russell)  OBJECTIVE:     PAIN: VITAL SIGNS: LINES/DRAINS:   Pre Treatment: Pain Screen  Pain Scale 1: FLACC  Pain Intensity 1: 0  Post Treatment: 0   None  O2 Device: None (Room air)     MOBILITY: I Mod I S SBA CGA Min Mod Max Total  NT x2 Comments:   Bed Mobility    Rolling [] [] [] [] [] [] [] [] [] [] []    Supine to Sit [] [] [] [] [] [x] [] [] [] [] [x]    Scooting [] [] [] [] [] [] [] [] [] [] []    Sit to Supine [] [] [] [] [] [x] [] [] [] [] [x]    Transfers    Sit to Stand [] [] [] [] [] [x] [] [] [] [] [x]    Bed to Chair [] [] [] [] [] [x] [] [] [] [] [x]    Stand to Sit [] [] [] [] [] [x] [] [] [] [] [x]    I=Independent, Mod I=Modified Independent, S=Supervision, SBA=Standby Assistance, CGA=Contact Guard Assistance,   Min=Minimal Assistance, Mod=Moderate Assistance, Max=Maximal Assistance, Total=Total Assistance, NT=Not Tested    BALANCE: Good Fair+ Fair Fair- Poor NT Comments   Sitting Static [] [x] [] [] [] []    Sitting Dynamic [] [] [x] [] [] []              Standing Static [] [] [] [] [x] []    Standing Dynamic [] [] [] [] [x] []      GAIT: I Mod I S SBA CGA Min Mod Max Total  NT x2 Comments:   Level of Assistance [] [] [] [] [] [x] [] [] [] [] [x]    Distance 15'    DME Rolling Walker    Gait Quality     Weightbearing  Status N/A     I=Independent, Mod I=Modified Independent, S=Supervision, SBA=Standby Assistance, CGA=Contact Guard Assistance,   Min=Minimal Assistance, Mod=Moderate Assistance, Max=Maximal Assistance, Total=Total Assistance, NT=Not Tested    PLAN:   FREQUENCY/DURATION: PT Plan of Care: 3 times/week for duration of hospital stay or until stated goals are met, whichever comes first.  TREATMENT:     TREATMENT:   ($$ Therapeutic Activity: 23-37 mins    )  Therapeutic Activity (25 Minutes):  Therapeutic activity included Supine to Sit, Sit to Supine, Scooting, Transfer Training, Ambulation on level ground, Sitting balance  and Standing balance to improve functional Mobility, Strength and Activity tolerance.     TREATMENT GRID:  N/A    AFTER TREATMENT POSITION/PRECAUTIONS:  Alarm Activated, Bed, Needs within reach and RN notified    INTERDISCIPLINARY COLLABORATION:  RN/PCT, PT/PTA and Rehab Attendant     TOTAL TREATMENT DURATION:  PT Patient Time In/Time Out  Time In: 0840  Time Out: Hillary Wong PTA

## 2022-01-19 NOTE — PROGRESS NOTES
CM met with patient and spouse to discuss discharge planning. Patient confused at this time. Patient's spouse confirmed she would like to proceed with STR placement at [address]. SNF selected. CM spoke with Neymar Kohli [phone] with Admissions, to determine when bed will be available. CM awaiting call back. PCR COVID Test to be ordered, for SNF Placement. CM continues to follow plan of care.

## 2022-01-20 LAB
SARS-COV-2, COV2: NOT DETECTED
SPECIMEN SOURCE, FCOV2M: NORMAL

## 2022-01-20 PROCEDURE — 74011250637 HC RX REV CODE- 250/637: Performed by: HOSPITALIST

## 2022-01-20 PROCEDURE — 74011250637 HC RX REV CODE- 250/637: Performed by: FAMILY MEDICINE

## 2022-01-20 PROCEDURE — 74011250637 HC RX REV CODE- 250/637: Performed by: NURSE PRACTITIONER

## 2022-01-20 PROCEDURE — 2709999900 HC NON-CHARGEABLE SUPPLY

## 2022-01-20 PROCEDURE — 74011000250 HC RX REV CODE- 250: Performed by: FAMILY MEDICINE

## 2022-01-20 PROCEDURE — 97530 THERAPEUTIC ACTIVITIES: CPT

## 2022-01-20 PROCEDURE — 65270000029 HC RM PRIVATE

## 2022-01-20 PROCEDURE — 74011250637 HC RX REV CODE- 250/637: Performed by: INTERNAL MEDICINE

## 2022-01-20 PROCEDURE — 65660000000 HC RM CCU STEPDOWN

## 2022-01-20 RX ORDER — METOPROLOL TARTRATE 25 MG/1
12.5 TABLET, FILM COATED ORAL 2 TIMES DAILY
Status: DISCONTINUED | OUTPATIENT
Start: 2022-01-20 | End: 2022-01-26 | Stop reason: HOSPADM

## 2022-01-20 RX ORDER — CLONIDINE HYDROCHLORIDE 0.1 MG/1
0.1 TABLET ORAL
Status: DISCONTINUED | OUTPATIENT
Start: 2022-01-20 | End: 2022-01-26 | Stop reason: HOSPADM

## 2022-01-20 RX ORDER — OLANZAPINE 2.5 MG/1
2.5 TABLET ORAL ONCE
Status: DISCONTINUED | OUTPATIENT
Start: 2022-01-20 | End: 2022-01-20

## 2022-01-20 RX ORDER — LORAZEPAM 0.5 MG/1
0.5 TABLET ORAL ONCE
Status: COMPLETED | OUTPATIENT
Start: 2022-01-20 | End: 2022-01-20

## 2022-01-20 RX ADMIN — METOPROLOL TARTRATE 12.5 MG: 25 TABLET, FILM COATED ORAL at 17:02

## 2022-01-20 RX ADMIN — SODIUM CHLORIDE, PRESERVATIVE FREE 10 ML: 5 INJECTION INTRAVENOUS at 21:15

## 2022-01-20 RX ADMIN — CLONIDINE HYDROCHLORIDE 0.1 MG: 0.1 TABLET ORAL at 06:02

## 2022-01-20 RX ADMIN — LORAZEPAM 0.5 MG: 0.5 TABLET ORAL at 18:15

## 2022-01-20 RX ADMIN — CARBIDOPA AND LEVODOPA 1.5 TABLET: 25; 100 TABLET ORAL at 16:40

## 2022-01-20 RX ADMIN — SODIUM CHLORIDE, PRESERVATIVE FREE 10 ML: 5 INJECTION INTRAVENOUS at 14:30

## 2022-01-20 RX ADMIN — CARBIDOPA AND LEVODOPA 1.5 TABLET: 25; 100 TABLET ORAL at 19:14

## 2022-01-20 RX ADMIN — CARBIDOPA AND LEVODOPA 1.5 TABLET: 25; 100 TABLET ORAL at 08:12

## 2022-01-20 RX ADMIN — ZONISAMIDE 25 MG: 25 CAPSULE ORAL at 08:12

## 2022-01-20 RX ADMIN — METOPROLOL TARTRATE 50 MG: 50 TABLET, FILM COATED ORAL at 08:13

## 2022-01-20 RX ADMIN — APIXABAN 5 MG: 5 TABLET, FILM COATED ORAL at 08:13

## 2022-01-20 RX ADMIN — QUETIAPINE FUMARATE 50 MG: 100 TABLET ORAL at 11:31

## 2022-01-20 RX ADMIN — CLONIDINE HYDROCHLORIDE 0.1 MG: 0.1 TABLET ORAL at 16:54

## 2022-01-20 RX ADMIN — CARBIDOPA AND LEVODOPA 2 TABLET: 25; 100 TABLET, EXTENDED RELEASE ORAL at 21:15

## 2022-01-20 RX ADMIN — APIXABAN 5 MG: 5 TABLET, FILM COATED ORAL at 20:27

## 2022-01-20 RX ADMIN — SODIUM CHLORIDE, PRESERVATIVE FREE 10 ML: 5 INJECTION INTRAVENOUS at 05:05

## 2022-01-20 RX ADMIN — QUETIAPINE FUMARATE 50 MG: 100 TABLET ORAL at 20:27

## 2022-01-20 RX ADMIN — LISINOPRIL 20 MG: 20 TABLET ORAL at 08:13

## 2022-01-20 RX ADMIN — CARBIDOPA AND LEVODOPA 1.5 TABLET: 25; 100 TABLET ORAL at 11:31

## 2022-01-20 NOTE — PROGRESS NOTES
Hospitalist Progress Note   Admit Date:  2022  5:12 PM   Name:  Dar Euceda   Age:  82 y.o. Sex:  male  :  1935   MRN:  [id number]   Room:  [facility]    Presenting Complaint: Altered mental status    Reason(s) for Admission: Metabolic encephalopathy [M40.88]  Bacterial pneumonia [J15.9]  Parkinson disease ([facility] [address] [G20]  Acute encephalopathy [G93.40]     Hospital Course & Interval History:     HPI:     Copied from history and physical/prior progress note HPI:  Patient is a 82 y.o. male who presented to the ED for cc AMS since yesterday. Hx of prostate cancer, CAD s/p CABG, HTN, HLD, parkinsons disease with associated psychosis. Vitlas - Stable but BP was elevated on arrival     Today--could not go to skilled nursing facility today because he required restraints yesterday. None recent- Seroquel appears effective. Notified of low heart rate and attenuated metoprolol significantly and added parameters. Confused but attempts to follow social graces.                                     Assessment and Plan      1- Acute encephalopathy, per admission, hx of Parkinson's disease, waxing and weaning, followed by Neurology. -Advanced with Parkinson's dementia- continue current meds and supportive care. 2- Possible community acquired pneumonia, bilateral, covid-19 screen negative, on room air-continue supportive care-ready for discharge to skilled nursing facility when bed available  3- Hypernatremia, hypovolemic, mild-improved  4- QT interval prolongation noted-monitor electrolytes  5- HTN--increased with agitation-increase frequency of as needed clonidine which may help agitation also. -Attenuated metoprolol due to bradycardia and may need to monitor bradycardia with clonidine  6- Hx of BPH, prostate Ca and CABG. continue home meds as appropriate and supportive care. 7- New atrial fibrillation, converted spontaneously to NSR, normal TSH and lytes, unremarkable echo for this matter. Chad2 score 2. Echo shows diastolic dysfunction.-Was started on Eliquis. Beta-blocker for rate control was attenuated today due to slow ventricular response        Dispo: REHAB TOMORROW--if accepted-avoid restraints if possible. Tolerating Seroquel dosing twice daily  Palliative care saw pt and Hospice will ne considered if do not improve in rehab per conversation with wife--- documented by prior [place] Problems as of 1/20/2022 Date Reviewed: 1/4/2022          Codes Class Noted - Resolved POA    Acute encephalopathy ICD-10-CM: G93.40  ICD-9-CM: 348.30  1/15/2022 - Present Unknown        * (Principal) Metabolic encephalopathy GYE-80-DC: G93.41  ICD-9-CM: 348.31  1/13/2022 - Present Unknown        Bacterial pneumonia ICD-10-CM: J15.9  ICD-9-CM: 482.9  1/13/2022 - Present Unknown        Parkinson disease ([facility] ICD-10-CM: [place] Phlegm  ICD-9-CM: 332.0  8/24/2016 - Present Unknown        Tremor ICD-10-CM: R25.1  ICD-9-CM: 781.0  8/24/2016 - Present Yes        Hyperlipidemia ICD-10-CM: E78.5  ICD-9-CM: 272.4  7/26/2016 - Present Yes        Parkinson's disease ([facility] ICD-10-CM: [place] Phlegm  ICD-9-CM: 332.0  7/26/2016 - Present Yes    Overview Signed 7/26/2016  1:54 PM by James COLLADO     Overview:   (Dr. Bertrand Simmons)    Last Assessment & Plan:   States that he received a letter that his neurologist, Dr. Zoe Mccormick has moved.   Will arrange f/u appt with a different neurologist.               HTN (hypertension) ICD-10-CM: I10  ICD-9-CM: 401.9  11/27/2013 - Present Yes        Malignant neoplasm of prostate ([facility] ICD-10-CM: C61  ICD-9-CM: 185  9/13/2010 - Present Yes    Overview Signed 7/26/2016  1:54 PM by James COLLADO     Overview:   (Dr. Kwasi Chiu)                   Objective:     Patient Vitals for the past 24 hrs:   Temp Pulse Resp BP SpO2   01/20/22 1702 -- 69 -- -- --   01/20/22 1654 -- 69 -- -- --   01/20/22 1634 98.1 °F (36.7 °C) 61 18 (!) 187/87 95 %   01/20/22 1146 -- (!) 57 -- -- --   01/20/22 1135 98.2 °F (36.8 °C) (!) 55 18 131/64 97 %   01/20/22 0803 98 °F (36.7 °C) 60 18 (!) 175/87 96 %   01/20/22 0559 -- 80 -- (!) 191/88 --   01/20/22 0419 98 °F (36.7 °C) 74 18 (!) 187/76 98 %   01/20/22 0125 97.5 °F (36.4 °C) (!) 55 18 139/66 94 %   01/19/22 1928 97.9 °F (36.6 °C) 64 18 (!) 157/68 99 %     Oxygen Therapy  O2 Sat (%): 95 % (01/20/22 1634)  Pulse via Oximetry: 64 beats per minute (01/14/22 1039)  O2 Device: None (Room air) (01/17/22 2310)    Estimated body mass index is 22.93 kg/m² as calculated from the following:    Height as of this encounter: 5' 8\" (1.727 m). Weight as of this encounter: 68.4 kg (150 lb 12.7 oz). No intake or output data in the 24 hours ending 01/20/22 1742      Physical Exam:     Blood pressure (!) 187/87, pulse 69, temperature 98.1 °F (36.7 °C), resp. rate 18, height 5' 8\" (1.727 m), weight 68.4 kg (150 lb 12.7 oz), SpO2 95 %. General:    Confused but attempts to obey commands and interact socially-recall 0/3 at 1 minute  Head:  Normocephalic, atraumatic  Eyes:  Sclerae appear normal.  Pupils equally round. ENT:  Nares appear normal, no drainage. Moist oral mucosa  Neck:  No restricted ROM. Trachea midline   CV: At present time regular and rate is controlled- rate just under 60 at time of my exam.  Lungs:   CTAB. No wheezing, rhonchi, or rales. Respirations even, unlabored  Abdomen: Bowel sounds present. Soft, nontender, nondistended. Extremities: No cyanosis or clubbing. No edema  Skin:     No rashes and normal coloration. Warm and dry. Neuro:  CN II-XII grossly intact. Sensation intact.   A&Ox3  Psych:  Parkinson's with dementia    I have reviewed ordered lab tests and independently visualized imaging below:    Recent Labs:  Recent Results (from the past 48 hour(s))   SARS-COV-2    Collection Time: 01/19/22  3:19 PM   Result Value Ref Range    SARS-CoV-2 Please find results under separate order     SARS-COV-2, PCR    Collection Time: 01/19/22  3:19 PM    Specimen: Nasopharyngeal   Result Value Ref Range    Specimen source Nasopharyngeal      SARS-CoV-2 Not detected NOTD         All Micro Results     Procedure Component Value Units Date/Time    SARS-COV-2, PCR [[id number]] Collected: 01/19/22 1519    Order Status: Completed Specimen: Nasopharyngeal Updated: 01/20/22 0132     Specimen source Nasopharyngeal        SARS-CoV-2 Not detected        Comment:      The specimen is NEGATIVE for SARS-CoV-2, the novel coronavirus associated with COVID-19. This test has been authorized by the FDA under an Emergency Use Authorization (EUA) for use by authorized laboratories.         Fact sheet for Healthcare Providers: Firetide.nz       Fact sheet for Patients: Firetide[employer]nz       Methodology: RT-PCR         BLOOD CULTURE [[id number]] Collected: 01/14/22 0532    Order Status: Completed Specimen: Blood Updated: 01/19/22 0742     Special Requests: --        LEFT  Antecubital       Culture result: NO GROWTH 5 DAYS       BLOOD CULTURE [[id number]] Collected: 01/13/22 1732    Order Status: Completed Specimen: Blood Updated: 01/18/22 0624     Special Requests: FOREARM        Culture result: NO GROWTH 5 DAYS       RESPIRATORY VIRUS PANEL W/COVID-19, PCR [[id number]] Collected: 01/13/22 2100    Order Status: Completed Specimen: Nasopharyngeal Updated: 01/13/22 2218     Adenovirus NOT DETECTED        Coronavirus 229E NOT DETECTED        Coronavirus HKU1 NOT DETECTED        Coronavirus CVNL63 NOT DETECTED        Coronavirus OC43 NOT DETECTED        SARS-CoV-2, PCR NOT DETECTED        Metapneumovirus NOT DETECTED        Rhinovirus and Enterovirus NOT DETECTED        Influenza A NOT DETECTED        Influenza B NOT DETECTED        Parainfluenza 1 NOT DETECTED        Parainfluenza 2 NOT DETECTED        Parainfluenza 3 NOT DETECTED        Parainfluenza virus 4 NOT DETECTED        RSV by PCR NOT DETECTED        B. parapertussis, PCR NOT DETECTED Bordetella pertussis - PCR NOT DETECTED        Chlamydophila pneumoniae DNA, QL, PCR NOT DETECTED        Mycoplasma pneumoniae DNA, QL, PCR NOT DETECTED       COVID-19 RAPID TEST [[id number]] Collected: 01/13/22 1732    Order Status: Completed Specimen: Nasopharyngeal Updated: 01/13/22 1816     Specimen source NASAL        Comment: RAPID ONLY        COVID-19 rapid test Not detected        Comment:      The specimen is NEGATIVE for SARS-CoV-2, the novel coronavirus associated with COVID-19. A negative result does not rule out COVID-19. This test has been authorized by the FDA under an Emergency Use Authorization (EUA) for use by authorized laboratories. Fact sheet for Healthcare Providers: [employer]raxisco.nz  Fact sheet for Patients: OneAway.nz       Methodology: Isothermal Nucleic Acid Amplification               Other Studies:  No results found.     Current Meds:  Current Facility-Administered Medications   Medication Dose Route Frequency    metoprolol tartrate (LOPRESSOR) tablet 12.5 mg  12.5 mg Oral BID    lisinopriL (PRINIVIL, ZESTRIL) tablet 20 mg  20 mg Oral DAILY    apixaban (ELIQUIS) tablet 5 mg  5 mg Oral BID    metoprolol (LOPRESSOR) injection 5 mg  5 mg IntraVENous Q6H PRN    carbidopa-levodopa (PARCOPA)  mg rapid dissolve tablet 1.5 Tablet  1.5 Tablet Oral PRN    zonisamide (ZONEGRAN) capsule 25 mg  25 mg Oral DAILY    cloNIDine HCL (CATAPRES) tablet 0.1 mg  0.1 mg Oral BID PRN    influenza vaccine 2021-22 (6 mos+)(PF) (FLUARIX/FLULAVAL/FLUZONE QUAD) injection 0.5 mL  1 Each IntraMUSCular PRIOR TO DISCHARGE    QUEtiapine (SEROquel) tablet 50 mg  50 mg Oral BID    sodium chloride (NS) flush 5-40 mL  5-40 mL IntraVENous Q8H    sodium chloride (NS) flush 5-40 mL  5-40 mL IntraVENous PRN    acetaminophen (TYLENOL) tablet 650 mg  650 mg Oral Q6H PRN    Or    acetaminophen (TYLENOL) suppository 650 mg  650 mg Rectal Q6H PRN    polyethylene glycol (MIRALAX) packet 17 g  17 g Oral DAILY PRN    ondansetron (ZOFRAN ODT) tablet 4 mg  4 mg Oral Q8H PRN    Or    ondansetron (ZOFRAN) injection 4 mg  4 mg IntraVENous Q6H PRN    carbidopa-levodopa ER (SINEMET CR)  mg per tablet 2 Tablet  2 Tablet Oral QHS    pimavanserin cap 34 mg (Nuplazid) pt supplied (Patient Supplied)  34 mg Oral DAILY    carbidopa-levodopa (SINEMET)  mg per tablet 1.5 Tablet  1.5 Tablet Oral QID       Signed:  Bertrand Gracia DO    Part of this note may have been written by using a voice dictation software. The note has been proof read but may still contain some grammatical/other typographical errors.

## 2022-01-20 NOTE — PROGRESS NOTES
Patient unable to admit to [address] this day , as patient will need to be without restraints for 48 hours. Most recent clinicals and documentation regarding  Restraints faxed to Concha Yanes with Admissions at [address]. CM awaiting call back from staff, to determine if patient can discharge to [facility] 1/21. CM continues to follow plan of care. Spouse updated.

## 2022-01-20 NOTE — PROGRESS NOTES
Per the primary RN yesterday, Renee Dumont:    Mittens removed 1/19/2022 @ 1130  Lap belt removed 1/19/2022 @ 1702

## 2022-01-20 NOTE — PROGRESS NOTES
Patient very anxious and attempting to get out of bed. Tried several times to redirect patient without success.  Notified Dr. Nayely Bell. New order for Ativan X 1.

## 2022-01-20 NOTE — PROGRESS NOTES
ACUTE PHYSICAL THERAPY GOALS:  (Developed with and agreed upon by patient and/or caregiver.)  (1.) Doug Arenas  will move from supine to sit and sit to supine  with STAND BY ASSIST within 3 treatment day(s). (2.) Doug Arenas will transfer from bed to chair and chair to bed with CONTACT GUARD ASSIST using the least restrictive device within 3 treatment day(s). (3.) Doug Arenas will ambulate with CONTACT GUARD ASSIST for 10 feet with the least restrictive device within 3 treatment day(s). (4.) Doug Arenas will perform standing static and dynamic balance activities x 10+ minutes with CONTACT GUARD ASSIST to improve safety within 3 treatment day(s). (5.) Doug Arenas will improved seated balance to stand by assist to improve functional mobility and safety within 3 treatment day(s).         PHYSICAL THERAPY: Daily Note and AM Treatment Day # 3    Doug Arenas is a 82 y.o. male   PRIMARY DIAGNOSIS: Metabolic encephalopathy  Metabolic encephalopathy [F69.09]  Bacterial pneumonia [J15.9]  [facility] ([facility] [G20]  Acute encephalopathy [G93.40]         ASSESSMENT:     REHAB RECOMMENDATIONS: CURRENT LEVEL OF FUNCTION:  (Most Recently Demonstrated)   Recommendation to date pending progress:  Setting:   Short-term Rehab  Equipment:    To Be Determined Bed Mobility:   Standby Assistance  Sit to Stand:   Minimal Assistance  Transfers:   Minimal Assistance  Gait/Mobility:   Minimal Assistance     ASSESSMENT:  Mr. Brandon Winslow is making slow progress towards PT goals. Patient presents slightly agitated today and VERY impulsive needing frequent and constant redirection. He performs supine to sit with SBA and transfers with min assist. Patient ambulates 15' around the bed initially with RW but therapist deemed this a safety hazard as he was using it improperly. Patient returned to EOB and to supine with SBA.  Once supine he was constantly attempting to get back out of bed despite redirection and reorientation. RN and  aware. Will continue efforts. SUBJECTIVE:   Mr. Brandon Winslow states, \"I need to catch a train. Please get out of my way. \"    SOCIAL HISTORY/ LIVING ENVIRONMENT: See initial evaluation  Home Environment: Patient room  One/Two Story Residence: One story  Living Alone: No  Support Systems: Spouse/Significant Other,Child(russell)  OBJECTIVE:     PAIN: VITAL SIGNS: LINES/DRAINS:   Pre Treatment: Pain Screen  Pain Scale 1: FLACC  Pain Intensity 1: 0  Post Treatment: 0   None  O2 Device: None (Room air)     MOBILITY: I Mod I S SBA CGA Min Mod Max Total  NT x2 Comments:   Bed Mobility    Rolling [] [] [] [] [] [] [] [] [] [] []    Supine to Sit [] [] [] [x] [] [] [] [] [] [] []    Scooting [] [] [] [] [] [] [] [] [] [] []    Sit to Supine [] [] [] [x] [] [] [] [] [] [] []    Transfers    Sit to Stand [] [] [] [] [] [x] [] [] [] [] []    Bed to Chair [] [] [] [] [] [x] [] [] [] [] []    Stand to Sit [] [] [] [] [] [x] [] [] [] [] []    I=Independent, Mod I=Modified Independent, S=Supervision, SBA=Standby Assistance, CGA=Contact Guard Assistance,   Min=Minimal Assistance, Mod=Moderate Assistance, Max=Maximal Assistance, Total=Total Assistance, NT=Not Tested    BALANCE: Good Fair+ Fair Fair- Poor NT Comments   Sitting Static [x] [x] [] [] [] []    Sitting Dynamic [] [x] [] [] [] []              Standing Static [] [] [] [x] [] []    Standing Dynamic [] [] [] [] [x] []      GAIT: I Mod I S SBA CGA Min Mod Max Total  NT x2 Comments:   Level of Assistance [] [] [] [] [] [x] [] [] [] [] []    Distance 15'    DME Gait Belt and HHA    Gait Quality     Weightbearing  Status N/A     I=Independent, Mod I=Modified Independent, S=Supervision, SBA=Standby Assistance, CGA=Contact Guard Assistance,   Min=Minimal Assistance, Mod=Moderate Assistance, Max=Maximal Assistance, Total=Total Assistance, NT=Not Tested    PLAN:   FREQUENCY/DURATION: PT Plan of Care: 3 times/week for duration of hospital stay or until stated goals are met, whichever comes first.  TREATMENT:     TREATMENT:   ($$ Therapeutic Activity: 23-37 mins    )  Therapeutic Activity (24 Minutes): Therapeutic activity included Supine to Sit, Sit to Supine, Scooting, Transfer Training, Ambulation on level ground, Sitting balance  and Standing balance to improve functional Mobility, Strength and Activity tolerance.     TREATMENT GRID:  N/A    AFTER TREATMENT POSITION/PRECAUTIONS:  Alarm Activated, Bed, Needs within reach and RN notified    INTERDISCIPLINARY COLLABORATION:  RN/PCT and PT/PTA    TOTAL TREATMENT DURATION:  PT Patient Time In/Time Out  Time In: 0840  Time Out: [address] Logan Wong PTA

## 2022-01-20 NOTE — PROGRESS NOTES
Telemetry called to notify RN that patient's heart rate 48. Patient's heart rate currently 51bpm. Dr. Mikala Moralez notified.

## 2022-01-21 LAB
ANION GAP SERPL CALC-SCNC: 6 MMOL/L (ref 7–16)
BASOPHILS # BLD: 0.1 K/UL (ref 0–0.2)
BASOPHILS NFR BLD: 1 % (ref 0–2)
BUN SERPL-MCNC: 24 MG/DL (ref 8–23)
CALCIUM SERPL-MCNC: 9 MG/DL (ref 8.3–10.4)
CHLORIDE SERPL-SCNC: 110 MMOL/L (ref 98–107)
CO2 SERPL-SCNC: 25 MMOL/L (ref 21–32)
CREAT SERPL-MCNC: 0.78 MG/DL (ref 0.8–1.5)
DIFFERENTIAL METHOD BLD: ABNORMAL
EOSINOPHIL # BLD: 0.4 K/UL (ref 0–0.8)
EOSINOPHIL NFR BLD: 7 % (ref 0.5–7.8)
ERYTHROCYTE [DISTWIDTH] IN BLOOD BY AUTOMATED COUNT: 11.9 % (ref 11.9–14.6)
GLUCOSE SERPL-MCNC: 85 MG/DL (ref 65–100)
HCT VFR BLD AUTO: 39.4 % (ref 41.1–50.3)
HGB BLD-MCNC: 12.9 G/DL (ref 13.6–17.2)
IMM GRANULOCYTES # BLD AUTO: 0 K/UL (ref 0–0.5)
IMM GRANULOCYTES NFR BLD AUTO: 0 % (ref 0–5)
LYMPHOCYTES # BLD: 2 K/UL (ref 0.5–4.6)
LYMPHOCYTES NFR BLD: 34 % (ref 13–44)
MCH RBC QN AUTO: 30.1 PG (ref 26.1–32.9)
MCHC RBC AUTO-ENTMCNC: 32.7 G/DL (ref 31.4–35)
MCV RBC AUTO: 91.8 FL (ref 79.6–97.8)
MONOCYTES # BLD: 0.6 K/UL (ref 0.1–1.3)
MONOCYTES NFR BLD: 10 % (ref 4–12)
NEUTS SEG # BLD: 2.8 K/UL (ref 1.7–8.2)
NEUTS SEG NFR BLD: 48 % (ref 43–78)
NRBC # BLD: 0 K/UL (ref 0–0.2)
PLATELET # BLD AUTO: 148 K/UL (ref 150–450)
PMV BLD AUTO: 11.6 FL (ref 9.4–12.3)
POTASSIUM SERPL-SCNC: 3.6 MMOL/L (ref 3.5–5.1)
RBC # BLD AUTO: 4.29 M/UL (ref 4.23–5.6)
SODIUM SERPL-SCNC: 141 MMOL/L (ref 138–145)
WBC # BLD AUTO: 5.8 K/UL (ref 4.3–11.1)

## 2022-01-21 PROCEDURE — 74011250637 HC RX REV CODE- 250/637: Performed by: INTERNAL MEDICINE

## 2022-01-21 PROCEDURE — 74011250637 HC RX REV CODE- 250/637: Performed by: FAMILY MEDICINE

## 2022-01-21 PROCEDURE — 74011250637 HC RX REV CODE- 250/637: Performed by: NURSE PRACTITIONER

## 2022-01-21 PROCEDURE — 2709999900 HC NON-CHARGEABLE SUPPLY

## 2022-01-21 PROCEDURE — 74011000250 HC RX REV CODE- 250: Performed by: FAMILY MEDICINE

## 2022-01-21 PROCEDURE — 65270000029 HC RM PRIVATE

## 2022-01-21 PROCEDURE — 85025 COMPLETE CBC W/AUTO DIFF WBC: CPT

## 2022-01-21 PROCEDURE — 36415 COLL VENOUS BLD VENIPUNCTURE: CPT

## 2022-01-21 PROCEDURE — 80048 BASIC METABOLIC PNL TOTAL CA: CPT

## 2022-01-21 PROCEDURE — 74011250637 HC RX REV CODE- 250/637: Performed by: HOSPITALIST

## 2022-01-21 RX ORDER — LISINOPRIL 20 MG/1
40 TABLET ORAL DAILY
Status: DISCONTINUED | OUTPATIENT
Start: 2022-01-22 | End: 2022-01-26 | Stop reason: HOSPADM

## 2022-01-21 RX ADMIN — CARBIDOPA AND LEVODOPA 1.5 TABLET: 25; 100 TABLET ORAL at 17:10

## 2022-01-21 RX ADMIN — CARBIDOPA AND LEVODOPA 1.5 TABLET: 25; 100 TABLET ORAL at 21:29

## 2022-01-21 RX ADMIN — APIXABAN 5 MG: 5 TABLET, FILM COATED ORAL at 08:57

## 2022-01-21 RX ADMIN — CARBIDOPA AND LEVODOPA 1.5 TABLET: 25; 100 TABLET ORAL at 11:12

## 2022-01-21 RX ADMIN — SODIUM CHLORIDE, PRESERVATIVE FREE 10 ML: 5 INJECTION INTRAVENOUS at 21:35

## 2022-01-21 RX ADMIN — APIXABAN 5 MG: 5 TABLET, FILM COATED ORAL at 21:31

## 2022-01-21 RX ADMIN — CLONIDINE HYDROCHLORIDE 0.1 MG: 0.1 TABLET ORAL at 21:34

## 2022-01-21 RX ADMIN — CLONIDINE HYDROCHLORIDE 0.1 MG: 0.1 TABLET ORAL at 17:12

## 2022-01-21 RX ADMIN — METOPROLOL TARTRATE 12.5 MG: 25 TABLET, FILM COATED ORAL at 17:11

## 2022-01-21 RX ADMIN — SODIUM CHLORIDE, PRESERVATIVE FREE 10 ML: 5 INJECTION INTRAVENOUS at 14:01

## 2022-01-21 RX ADMIN — METOPROLOL TARTRATE 12.5 MG: 25 TABLET, FILM COATED ORAL at 08:57

## 2022-01-21 RX ADMIN — CARBIDOPA AND LEVODOPA 2 TABLET: 25; 100 TABLET, EXTENDED RELEASE ORAL at 21:30

## 2022-01-21 RX ADMIN — CARBIDOPA AND LEVODOPA 1.5 TABLET: 25; 100 TABLET ORAL at 08:57

## 2022-01-21 RX ADMIN — QUETIAPINE FUMARATE 50 MG: 100 TABLET ORAL at 21:30

## 2022-01-21 RX ADMIN — SODIUM CHLORIDE, PRESERVATIVE FREE 10 ML: 5 INJECTION INTRAVENOUS at 05:08

## 2022-01-21 RX ADMIN — QUETIAPINE FUMARATE 50 MG: 100 TABLET ORAL at 11:13

## 2022-01-21 RX ADMIN — ZONISAMIDE 25 MG: 25 CAPSULE ORAL at 08:57

## 2022-01-21 RX ADMIN — LISINOPRIL 20 MG: 20 TABLET ORAL at 08:57

## 2022-01-21 NOTE — ROUTINE PROCESS
TRANSFER - IN REPORT:    Verbal report received from KATIE Grider  on [facility]  being received from 6th floor for routine progression of care      Report consisted of patients Situation, Background, Assessment and   Recommendations(SBAR). Information from the following report(s) SBAR was reviewed with the receiving nurse. Opportunity for questions and clarification was provided. Assessment will be completed upon patients arrival to unit and care will be assumed.

## 2022-01-21 NOTE — PROGRESS NOTES
TRANSFER - OUT REPORT:    Verbal report given to [facility], RN(name) on Loy Barksdale  being transferred to 7th floor(unit) for routine progression of care       Report consisted of patients Situation, Background, Assessment and   Recommendations(SBAR). Information from the following report(s) SBAR was reviewed with the receiving nurse. Lines:   Peripheral IV 01/19/22 Anterior;Right Forearm (Active)   Site Assessment Clean, dry, & intact 01/21/22 0749   Phlebitis Assessment 0 01/21/22 0749   Infiltration Assessment 0 01/21/22 0749   Dressing Status Clean, dry, & intact 01/21/22 0749   Dressing Type Tape;Transparent 01/21/22 0749   Hub Color/Line Status Patent 01/21/22 0749   Alcohol Cap Used No 01/21/22 0749        Opportunity for questions and clarification was provided.       Patient transported with:   [employer]

## 2022-01-21 NOTE — PROGRESS NOTES
Physician Progress Note      PATIENT:               Gallo Mcclendon  CSN #:                  [id number]  :                       1935  ADMIT DATE:       2022 5:12 PM  [address] [place] DATE:  RESPONDING  PROVIDER #:        DENNISE SILVERIO MD          QUERY TEXT:    Pt admitted with increased confusion away from baseline mentation--hx of parkinson. Pt noted to have Possible CAP  If possible, please document in the progress notes and discharge summary if you are evaluating and/or treating any of the following:    Note: CAP and HCAP indicate where the pneumonia was acquired, not a specific type. The medical record reflects the following:  Risk Factors: parkinson's dementia, increased AMS, metabolic encephalopathy  Clinical Indicators: cxray-- mild opacities in the mid and lower lung fields. wbc 7, 7.2, procal <0.05  Treatment: iv zosyn changed to po comparative, cxray, fluids, neuro consult, essential home meds. Options provided:  -- Gram negative pneumonia  -- Gram positive pneumonia  -- MRSA pneumonia  -- MSSA pneumonia  -- Bacterial pneumonia  -- Viral pneumonia  -- Aspiration pneumonia  -- Hypostatic pneumonia  -- Other - I will add my own diagnosis  -- Disagree - Not applicable / Not valid  -- Disagree - Clinically unable to determine / Unknown  -- Refer to Clinical Documentation Reviewer    PROVIDER RESPONSE TEXT:    This patient has viral pneumonia.     Query created by: Farhan Ross on 2022 1:43 PM      Electronically signed by:  Luellen Severance MD Doylene Pares MD 2022 2:34 PM

## 2022-01-21 NOTE — PROGRESS NOTES
Hospitalist Progress Note   Admit Date:  2022  5:12 PM   Name:  Claudine Garg   Age:  82 y.o. Sex:  male  :  1935   MRN:  [id number]   Room:  2/    Presenting Complaint: Altered mental status    Reason(s) for Admission: Metabolic encephalopathy [G02.64]  Bacterial pneumonia [J15.9]  Parkinson disease ([facility] [address] [G20]  Acute encephalopathy [G93.40]     Hospital Course & Interval History:     HPI:     Copied from history and physical/prior progress note HPI:  Patient is a 82 y.o. male who presented to the ED for cc AMS since yesterday. Hx of prostate cancer, CAD s/p CABG, HTN, HLD, parkinsons disease with associated psychosis. Vitlas - Stable but BP was elevated on arrival     Today 22--could not go to skilled nursing facility today because he required restraints 21- facility has updated casemgmt and will accept on 22  No new gross distress or complaints                                     Assessment and Plan      1- Acute encephalopathy, per admission, hx of Parkinson's disease, waxing and weaning, followed by Neurology outpt. .   -Advanced with Parkinson's dementia- continue current meds and supportive care. 2- Possible community acquired pneumonia, bilateral, covid-19 screen negative, on room air-continue supportive care-ready for discharge to skilled nursing facility for 22  3- HTN--increased with agitation-increase frequency of as needed clonidine which may help agitation also. -Attenuated metoprolol due to bradycardia and may need to monitor bradycardia with clonidine  4- Hx of BPH, prostate Ca and CABG. continue home meds as appropriate and supportive care. 5- New atrial fibrillation, converted spontaneously to NSR, normal TSH and lytes, unremarkable echo for this matter. Chad2 score 2. Echo shows diastolic dysfunction.-Was started on Eliquis.  Beta-blocker for rate control was attenuated due to slow ventricular response--rate controlled and bp fair        Dispo: [facility] Sunday 1/23/22--per facility communication to [facility]. Palliative care saw pt and Hospice will ne considered if do not improve in rehab per conversation with wife--- documented by prior Terrence Problems as of 1/21/2022 Date Reviewed: 1/4/2022          Codes Class Noted - Resolved POA    Acute encephalopathy ICD-10-CM: G93.40  ICD-9-CM: 348.30  1/15/2022 - Present Unknown        * (Principal) Metabolic encephalopathy FLA-69-YJ: G93.41  ICD-9-CM: 348.31  1/13/2022 - Present Unknown        Bacterial pneumonia ICD-10-CM: J15.9  ICD-9-CM: 482.9  1/13/2022 - Present Unknown        Parkinson disease ([facility] ICD-10-CM: Linzie Penelope  ICD-9-CM: 332.0  8/24/2016 - Present Unknown        Tremor ICD-10-CM: R25.1  ICD-9-CM: 781.0  8/24/2016 - Present Yes        Hyperlipidemia ICD-10-CM: E78.5  ICD-9-CM: 272.4  7/26/2016 - Present Yes        Parkinson's disease ([facility] ICD-10-CM: Linzie Penelope  ICD-9-CM: 332.0  7/26/2016 - Present Yes    Overview Signed 7/26/2016  1:54 PM by Rob COLLADO     Overview:   (Dr. Kirby Rain)    Last Assessment & Plan:   States that he received a letter that his neurologist, Dr. Nino Umaña has moved.   Will arrange f/u appt with a different neurologist.               HTN (hypertension) ICD-10-CM: I10  ICD-9-CM: 401.9  11/27/2013 - Present Yes        Malignant neoplasm of prostate ([facility] ICD-10-CM: C61  ICD-9-CM: 867  9/13/2010 - Present Yes    Overview Signed 7/26/2016  1:54 PM by Rob COLLADO     Overview:   (Dr. Ashutosh Campos)                   Objective:     Patient Vitals for the past 24 hrs:   Temp Pulse Resp BP SpO2   01/21/22 1100 97.4 °F (36.3 °C) (!) 58 14 (!) 167/86 96 %   01/21/22 0825 97.8 °F (36.6 °C) (!) 57 14 (!) 188/69 98 %   01/21/22 0438 97.7 °F (36.5 °C) (!) 58 18 (!) 156/74 96 %   01/20/22 2328 98 °F (36.7 °C) (!) 55 18 119/67 98 %   01/20/22 1926 97.8 °F (36.6 °C) 62 18 (!) 141/78 98 %   01/20/22 1702 -- 69 -- -- --   01/20/22 1654 -- 69 -- -- -- Oxygen Therapy  O2 Sat (%): 96 % (01/21/22 1100)  Pulse via Oximetry: 64 beats per minute (01/14/22 1039)  O2 Device: None (Room air) (01/21/22 2797)    Estimated body mass index is 22.93 kg/m² as calculated from the following:    Height as of this encounter: 5' 8\" (1.727 m). Weight as of this encounter: 68.4 kg (150 lb 12.7 oz). Intake/Output Summary (Last 24 hours) at 1/21/2022 1634  Last data filed at 1/21/2022 0441  Gross per 24 hour   Intake 0 ml   Output --   Net 0 ml         Physical Exam:     Blood pressure (!) 167/86, pulse (!) 58, temperature 97.4 °F (36.3 °C), resp. rate 14, height 5' 8\" (1.727 m), weight 68.4 kg (150 lb 12.7 oz), SpO2 96 %. General-alert and oriented x3, cooperative and calm  Eyes-conjunctive are clear pupils equal round react to light extraocular muscles intact  Ears-no deformity-no drainage  Nares-no nasal deformity-no discharge-turbinates not significantly enlarged  Throat-oral mucosa moist, no erythema or exudate  Heart-rate ok, regular  At time of exam.   Lungs-clear auscultation palpation and percussion, symmetric excursion of the chest wall. No wheezing  Abdomen-soft, nontender, no gross percussible organomegaly. No gross distention. Bowel sounds present all 4 quadrants  Extremities-no significant edema, moves all extremities symmetrically.   Skin-no obvious breakdown or significant rash  Neurologic-cranial nerves II through XII grossly intact, no new focal motor deficits  Psychiatric-difficult to assess with dementia-- responded early am to ativan for acute agitation    I have reviewed ordered lab tests and independently visualized imaging below:    Recent Labs:  Recent Results (from the past 48 hour(s))   CBC WITH AUTOMATED DIFF    Collection Time: 01/21/22  6:50 AM   Result Value Ref Range    WBC 5.8 4.3 - 11.1 K/uL    RBC 4.29 4.23 - 5.6 M/uL    HGB 12.9 (L) 13.6 - 17.2 g/dL    HCT 39.4 (L) 41.1 - 50.3 %    MCV 91.8 79.6 - 97.8 FL    MCH 30.1 26.1 - 32.9 PG MCHC 32.7 31.4 - 35.0 g/dL    RDW 11.9 11.9 - 14.6 %    PLATELET 829 (L) 098 - 450 K/uL    MPV 11.6 9.4 - 12.3 FL    ABSOLUTE NRBC 0.00 0.0 - 0.2 K/uL    DF AUTOMATED      NEUTROPHILS 48 43 - 78 %    LYMPHOCYTES 34 13 - 44 %    MONOCYTES 10 4.0 - 12.0 %    EOSINOPHILS 7 0.5 - 7.8 %    BASOPHILS 1 0.0 - 2.0 %    IMMATURE GRANULOCYTES 0 0.0 - 5.0 %    ABS. NEUTROPHILS 2.8 1.7 - 8.2 K/UL    ABS. LYMPHOCYTES 2.0 0.5 - 4.6 K/UL    ABS. MONOCYTES 0.6 0.1 - 1.3 K/UL    ABS. EOSINOPHILS 0.4 0.0 - 0.8 K/UL    ABS. BASOPHILS 0.1 0.0 - 0.2 K/UL    ABS. IMM. GRANS. 0.0 0.0 - 0.5 K/UL   METABOLIC PANEL, BASIC    Collection Time: 01/21/22  6:50 AM   Result Value Ref Range    Sodium 141 138 - 145 mmol/L    Potassium 3.6 3.5 - 5.1 mmol/L    Chloride 110 (H) 98 - 107 mmol/L    CO2 25 21 - 32 mmol/L    Anion gap 6 (L) 7 - 16 mmol/L    Glucose 85 65 - 100 mg/dL    BUN 24 (H) 8 - 23 MG/DL    Creatinine 0.78 (L) 0.8 - 1.5 MG/DL    GFR est AA >60 >60 ml/min/1.73m2    GFR est non-AA >60 >60 ml/min/1.73m2    Calcium 9.0 8.3 - 10.4 MG/DL       All Micro Results     Procedure Component Value Units Date/Time    SARS-COV-2, PCR [[id number]] Collected: 01/19/22 1519    Order Status: Completed Specimen: Nasopharyngeal Updated: 01/20/22 0132     Specimen source Nasopharyngeal        SARS-CoV-2 Not detected        Comment:      The specimen is NEGATIVE for SARS-CoV-2, the novel coronavirus associated with COVID-19. This test has been authorized by the FDA under an Emergency Use Authorization (EUA) for use by authorized laboratories.         Fact sheet for Healthcare Providers: iTendency.uy       Fact sheet for Patients: iTendency.uy       Methodology: RT-PCR         BLOOD CULTURE [[id number]] Collected: 01/14/22 0532    Order Status: Completed Specimen: Blood Updated: 01/19/22 0742     Special Requests: --        LEFT  Antecubital       Culture result: NO GROWTH 5 DAYS       BLOOD CULTURE [[id number]] Collected: 01/13/22 1732    Order Status: Completed Specimen: Blood Updated: 01/18/22 0624     Special Requests: FOREARM        Culture result: NO GROWTH 5 DAYS       RESPIRATORY VIRUS PANEL W/COVID-19, PCR [[id number]] Collected: 01/13/22 2100    Order Status: Completed Specimen: Nasopharyngeal Updated: 01/13/22 2218     Adenovirus NOT DETECTED        Coronavirus 229E NOT DETECTED        Coronavirus HKU1 NOT DETECTED        Coronavirus CVNL63 NOT DETECTED        Coronavirus OC43 NOT DETECTED        SARS-CoV-2, PCR NOT DETECTED        Metapneumovirus NOT DETECTED        Rhinovirus and Enterovirus NOT DETECTED        Influenza A NOT DETECTED        Influenza B NOT DETECTED        Parainfluenza 1 NOT DETECTED        Parainfluenza 2 NOT DETECTED        Parainfluenza 3 NOT DETECTED        Parainfluenza virus 4 NOT DETECTED        RSV by PCR NOT DETECTED        B. parapertussis, PCR NOT DETECTED        Bordetella pertussis - PCR NOT DETECTED        Chlamydophila pneumoniae DNA, QL, PCR NOT DETECTED        Mycoplasma pneumoniae DNA, QL, PCR NOT DETECTED       COVID-19 RAPID TEST [[id number]] Collected: 01/13/22 1732    Order Status: Completed Specimen: Nasopharyngeal Updated: 01/13/22 1816     Specimen source NASAL        Comment: RAPID ONLY        COVID-19 rapid test Not detected        Comment:      The specimen is NEGATIVE for SARS-CoV-2, the novel coronavirus associated with COVID-19. A negative result does not rule out COVID-19. This test has been authorized by the FDA under an Emergency Use Authorization (EUA) for use by authorized laboratories. Fact sheet for Healthcare Providers: ConventionUpdate.co.nz  Fact sheet for Patients: ConventionUpdate.co.nz       Methodology: Isothermal Nucleic Acid Amplification               Other Studies:  No results found.     Current Meds:  Current Facility-Administered Medications   Medication Dose Route Frequency  metoprolol tartrate (LOPRESSOR) tablet 12.5 mg  12.5 mg Oral BID    cloNIDine HCL (CATAPRES) tablet 0.1 mg  0.1 mg Oral Q4H PRN    lisinopriL (PRINIVIL, ZESTRIL) tablet 20 mg  20 mg Oral DAILY    apixaban (ELIQUIS) tablet 5 mg  5 mg Oral BID    metoprolol (LOPRESSOR) injection 5 mg  5 mg IntraVENous Q6H PRN    carbidopa-levodopa (PARCOPA)  mg rapid dissolve tablet 1.5 Tablet  1.5 Tablet Oral PRN    zonisamide (ZONEGRAN) capsule 25 mg  25 mg Oral DAILY    influenza vaccine 2021-22 (6 mos+)(PF) (FLUARIX/FLULAVAL/FLUZONE QUAD) injection 0.5 mL  1 Each IntraMUSCular PRIOR TO DISCHARGE    QUEtiapine (SEROquel) tablet 50 mg  50 mg Oral BID    sodium chloride (NS) flush 5-40 mL  5-40 mL IntraVENous Q8H    sodium chloride (NS) flush 5-40 mL  5-40 mL IntraVENous PRN    acetaminophen (TYLENOL) tablet 650 mg  650 mg Oral Q6H PRN    Or    acetaminophen (TYLENOL) suppository 650 mg  650 mg Rectal Q6H PRN    polyethylene glycol (MIRALAX) packet 17 g  17 g Oral DAILY PRN    ondansetron (ZOFRAN ODT) tablet 4 mg  4 mg Oral Q8H PRN    Or    ondansetron (ZOFRAN) injection 4 mg  4 mg IntraVENous Q6H PRN    carbidopa-levodopa ER (SINEMET CR)  mg per tablet 2 Tablet  2 Tablet Oral QHS    pimavanserin cap 34 mg (Nuplazid) pt supplied (Patient Supplied)  34 mg Oral DAILY    carbidopa-levodopa (SINEMET)  mg per tablet 1.5 Tablet  1.5 Tablet Oral QID       Signed:  Shala Mendoza DO    Part of this note may have been written by using a voice dictation software. The note has been proof read but may still contain some grammatical/other typographical errors.

## 2022-01-21 NOTE — PROGRESS NOTES
PT Daily Note:  HOLD PT today per RN due to increased agitation. Will check back on patient at a later date/time if schedule permits.   Thank you,  Elsa Avina, PTA

## 2022-01-21 NOTE — PROGRESS NOTES
CM spoke with [address] [place] [address], [address], STR placement. Per Emma Ca, patient can admit Sunday 1/23 at the earliest. CM will inform attending DO Rae of update. COVID PCR is requested for SNF admission 1/23. CM remains available.

## 2022-01-22 LAB
ANION GAP SERPL CALC-SCNC: 5 MMOL/L (ref 7–16)
BASOPHILS # BLD: 0.1 K/UL (ref 0–0.2)
BASOPHILS NFR BLD: 1 % (ref 0–2)
BUN SERPL-MCNC: 20 MG/DL (ref 8–23)
CALCIUM SERPL-MCNC: 9.4 MG/DL (ref 8.3–10.4)
CHLORIDE SERPL-SCNC: 107 MMOL/L (ref 98–107)
CO2 SERPL-SCNC: 28 MMOL/L (ref 21–32)
COVID-19 RAPID TEST, COVR: NOT DETECTED
CREAT SERPL-MCNC: 0.78 MG/DL (ref 0.8–1.5)
DIFFERENTIAL METHOD BLD: ABNORMAL
EOSINOPHIL # BLD: 0.7 K/UL (ref 0–0.8)
EOSINOPHIL NFR BLD: 9 % (ref 0.5–7.8)
ERYTHROCYTE [DISTWIDTH] IN BLOOD BY AUTOMATED COUNT: 11.5 % (ref 11.9–14.6)
GLUCOSE SERPL-MCNC: 88 MG/DL (ref 65–100)
HCT VFR BLD AUTO: 43.9 % (ref 41.1–50.3)
HGB BLD-MCNC: 14.6 G/DL (ref 13.6–17.2)
IMM GRANULOCYTES # BLD AUTO: 0 K/UL (ref 0–0.5)
IMM GRANULOCYTES NFR BLD AUTO: 0 % (ref 0–5)
LYMPHOCYTES # BLD: 2.2 K/UL (ref 0.5–4.6)
LYMPHOCYTES NFR BLD: 29 % (ref 13–44)
MAGNESIUM SERPL-MCNC: 2.2 MG/DL (ref 1.8–2.4)
MCH RBC QN AUTO: 30.9 PG (ref 26.1–32.9)
MCHC RBC AUTO-ENTMCNC: 33.3 G/DL (ref 31.4–35)
MCV RBC AUTO: 93 FL (ref 79.6–97.8)
MONOCYTES # BLD: 0.7 K/UL (ref 0.1–1.3)
MONOCYTES NFR BLD: 10 % (ref 4–12)
NEUTS SEG # BLD: 3.9 K/UL (ref 1.7–8.2)
NEUTS SEG NFR BLD: 52 % (ref 43–78)
NRBC # BLD: 0 K/UL (ref 0–0.2)
PLATELET # BLD AUTO: 168 K/UL (ref 150–450)
PMV BLD AUTO: 11.7 FL (ref 9.4–12.3)
POTASSIUM SERPL-SCNC: 3.4 MMOL/L (ref 3.5–5.1)
RBC # BLD AUTO: 4.72 M/UL (ref 4.23–5.6)
SARS-COV-2, COV2: NORMAL
SARS-COV-2, COV2: NOT DETECTED
SODIUM SERPL-SCNC: 140 MMOL/L (ref 138–145)
SOURCE, COVRS: NORMAL
SPECIMEN SOURCE, FCOV2M: NORMAL
WBC # BLD AUTO: 7.5 K/UL (ref 4.3–11.1)

## 2022-01-22 PROCEDURE — 74011250637 HC RX REV CODE- 250/637: Performed by: STUDENT IN AN ORGANIZED HEALTH CARE EDUCATION/TRAINING PROGRAM

## 2022-01-22 PROCEDURE — 80048 BASIC METABOLIC PNL TOTAL CA: CPT

## 2022-01-22 PROCEDURE — 74011250637 HC RX REV CODE- 250/637: Performed by: HOSPITALIST

## 2022-01-22 PROCEDURE — U0005 INFEC AGEN DETEC AMPLI PROBE: HCPCS

## 2022-01-22 PROCEDURE — 83735 ASSAY OF MAGNESIUM: CPT

## 2022-01-22 PROCEDURE — 36415 COLL VENOUS BLD VENIPUNCTURE: CPT

## 2022-01-22 PROCEDURE — 74011250637 HC RX REV CODE- 250/637: Performed by: NURSE PRACTITIONER

## 2022-01-22 PROCEDURE — 74011000250 HC RX REV CODE- 250: Performed by: FAMILY MEDICINE

## 2022-01-22 PROCEDURE — 65270000029 HC RM PRIVATE

## 2022-01-22 PROCEDURE — 85025 COMPLETE CBC W/AUTO DIFF WBC: CPT

## 2022-01-22 PROCEDURE — 74011250637 HC RX REV CODE- 250/637: Performed by: FAMILY MEDICINE

## 2022-01-22 PROCEDURE — 74011250637 HC RX REV CODE- 250/637: Performed by: INTERNAL MEDICINE

## 2022-01-22 PROCEDURE — 87635 SARS-COV-2 COVID-19 AMP PRB: CPT

## 2022-01-22 RX ORDER — POTASSIUM CHLORIDE 20 MEQ/1
40 TABLET, EXTENDED RELEASE ORAL 2 TIMES DAILY
Status: COMPLETED | OUTPATIENT
Start: 2022-01-22 | End: 2022-01-22

## 2022-01-22 RX ADMIN — CARBIDOPA AND LEVODOPA 1.5 TABLET: 25; 100 TABLET ORAL at 20:54

## 2022-01-22 RX ADMIN — LISINOPRIL 40 MG: 20 TABLET ORAL at 09:23

## 2022-01-22 RX ADMIN — ZONISAMIDE 25 MG: 25 CAPSULE ORAL at 09:23

## 2022-01-22 RX ADMIN — CARBIDOPA AND LEVODOPA 1.5 TABLET: 25; 100 TABLET ORAL at 16:33

## 2022-01-22 RX ADMIN — QUETIAPINE FUMARATE 50 MG: 100 TABLET ORAL at 11:51

## 2022-01-22 RX ADMIN — CARBIDOPA AND LEVODOPA 1.5 TABLET: 25; 100 TABLET ORAL at 11:51

## 2022-01-22 RX ADMIN — SODIUM CHLORIDE, PRESERVATIVE FREE 10 ML: 5 INJECTION INTRAVENOUS at 06:31

## 2022-01-22 RX ADMIN — SODIUM CHLORIDE, PRESERVATIVE FREE 10 ML: 5 INJECTION INTRAVENOUS at 13:28

## 2022-01-22 RX ADMIN — POTASSIUM CHLORIDE 40 MEQ: 20 TABLET, EXTENDED RELEASE ORAL at 09:22

## 2022-01-22 RX ADMIN — APIXABAN 5 MG: 5 TABLET, FILM COATED ORAL at 20:54

## 2022-01-22 RX ADMIN — CLONIDINE HYDROCHLORIDE 0.1 MG: 0.1 TABLET ORAL at 06:32

## 2022-01-22 RX ADMIN — METOPROLOL TARTRATE 12.5 MG: 25 TABLET, FILM COATED ORAL at 09:23

## 2022-01-22 RX ADMIN — POTASSIUM CHLORIDE 40 MEQ: 20 TABLET, EXTENDED RELEASE ORAL at 17:50

## 2022-01-22 RX ADMIN — SODIUM CHLORIDE, PRESERVATIVE FREE 5 ML: 5 INJECTION INTRAVENOUS at 20:59

## 2022-01-22 RX ADMIN — CARBIDOPA AND LEVODOPA 2 TABLET: 25; 100 TABLET, EXTENDED RELEASE ORAL at 20:53

## 2022-01-22 RX ADMIN — CLONIDINE HYDROCHLORIDE 0.1 MG: 0.1 TABLET ORAL at 20:59

## 2022-01-22 RX ADMIN — CARBIDOPA AND LEVODOPA 1.5 TABLET: 25; 100 TABLET ORAL at 09:23

## 2022-01-22 RX ADMIN — QUETIAPINE FUMARATE 50 MG: 100 TABLET ORAL at 20:54

## 2022-01-22 RX ADMIN — APIXABAN 5 MG: 5 TABLET, FILM COATED ORAL at 09:23

## 2022-01-22 NOTE — PROGRESS NOTES
Hospitalist Progress Note   Admit Date:  2022  5:12 PM   Name:  Gregorio Gar   Age:  82 y.o. Sex:  male  :  1935   MRN:  [id number]   Room:  [facility]/    Presenting Complaint: Altered mental status    Reason(s) for Admission: Metabolic encephalopathy [U15.35]  Bacterial pneumonia [J15.9]  Parkinson disease ([facility] [G20]  Acute encephalopathy [G93.40]     Hospital Course & Interval History:   Patient is an 83 y/o male with PMH prostate cancer, CAD s/p CABG, HTN, HLD, Parkinson's disease with associated psychosis who presented to ED  for cc AMS. Neurology evaluated patient for medications recommendations. Patient completed antibiotic course for possible CAP. Palliative care did evaluate patient; hospice will be considered if he does not improve at rehab per spouse. Patient cleared for discharge to skilled nursing facility  but could not transfer due to restraint use 21. Facility will accept patient tomorrow . Patient currently remains out of restraints. Subjective (22): Patient alert to self, calm, cooperative. Remains out of restraints. Denies chest pain and SOB.     Assessment & Plan:     Principal Problem:  Metabolic encephalopathy  Parkinson's Disease, Advanced  -Mentation waxes and wanes  -Follows neurology outpatient, neurology evaluated inpatient; they discontinued Azilact as it is related to AMS/Hallucinations  -Continue Sinemet, Nuplazid, Zonisamide, Seroquel  -Avoid Geodon/Haldol  -Patient currently alert to self only; calm and cooperative    Active Problems:  Malignant neoplasm of prostate (HCC)   -Noted    HTN (hypertension)  -Increased with agitation  -Continue Lisinopril, Lopressor, prn Clonidine    CAD s/p CABG  -Noted, continue BB, ACE    Atrial Fibrillation, new onset, resolved  -Converted spontaneously to NSR, TSH and lyes wnl, Echo unremarkable for this matter, shows diastolic dysfunction  -Initiated on Eliquis  -BB dose reduced due to bradycardia; currently rate-controlled    Bacterial pneumonia, resolved  -Possible CAP based on CXR imaging 1/13, s/p Zosyn/Cefuroxime and Doxycycline courses  -Covid-19 negative  -Hemodynamically stable on RA, currently without leukocytosis, afebrile    Dispo/Discharge Planning:  Anticipate d/c to SNF tomorrow, 1/23    Diet:  ADULT DIET Easy to Chew; Low Fat/Low Chol/High Fiber/2 gm Na  DVT PPx: Eliquis  Code status: DNR    Hospital Problems as of 1/22/2022 Date Reviewed: 1/4/2022          Codes Class Noted - Resolved POA    Acute encephalopathy ICD-10-CM: G93.40  ICD-9-CM: 348.30  1/15/2022 - Present Unknown        * (Principal) Metabolic encephalopathy [id number]: G93.41  ICD-9-CM: 348.31  1/13/2022 - Present Unknown        Bacterial pneumonia ICD-10-CM: J15.9  ICD-9-CM: 482.9  1/13/2022 - Present Unknown        Parkinson disease ([facility] ICD-10-CM: Geralynn Mainland  ICD-9-CM: 332.0  8/24/2016 - Present Unknown        Tremor ICD-10-CM: R25.1  ICD-9-CM: 781.0  8/24/2016 - Present Yes        Hyperlipidemia ICD-10-CM: E78.5  ICD-9-CM: 272.4  7/26/2016 - Present Yes        Parkinson's disease ([facility]  ICD-9-CM: 332.0  7/26/2016 - Present Yes    Overview Signed 7/26/2016  1:54 PM by Della COLLADO     Overview:   (Dr. Adelaide Brittle)    Last Assessment & Plan:   States that he received a letter that his neurologist, Dr. Satish King has moved.   Will arrange f/u appt with a different neurologist.               HTN (hypertension) ICD-10-CM: I10  ICD-9-CM: 401.9  11/27/2013 - Present Yes        Malignant neoplasm of prostate ([facility] ICD-10-CM: C61  ICD-9-CM: 185  9/13/2010 - Present Yes    Overview Signed 7/26/2016  1:54 PM by Della COLLADO     Overview:   (Dr. Sue Ochoa)                   Objective:     Patient Vitals for the past 24 hrs:   Temp Pulse Resp BP SpO2   01/22/22 1102 97.7 °F (36.5 °C) 61 16 98/86 99 %   01/22/22 0715 97.6 °F (36.4 °C) 73 16 (!) 176/78 100 %   01/22/22 0430 97.9 °F (36.6 °C) (!) 57 20 (!) 183/86 100 % 01/21/22 2332 98.1 °F (36.7 °C) 62 20 102/61 96 %   01/21/22 2134 -- 67 -- (!) 196/99 --   01/21/22 1935 98.3 °F (36.8 °C) (!) 56 18 (!) 172/113 95 %   01/21/22 1834 -- (!) 55 -- (!) 120/59 --   01/21/22 1711 -- 71 -- (!) 201/91 --   01/21/22 1658 98.7 °F (37.1 °C) 78 18 (!) 199/116 98 %     Oxygen Therapy  O2 Sat (%): 99 % (01/22/22 1102)  Pulse via Oximetry: 64 beats per minute (01/14/22 1039)  O2 Device: None (Room air) (01/21/22 1920)    Estimated body mass index is 22.93 kg/m² as calculated from the following:    Height as of this encounter: 5' 8\" (1.727 m). Weight as of this encounter: 68.4 kg (150 lb 12.7 oz). Intake/Output Summary (Last 24 hours) at 1/22/2022 1342  Last data filed at 1/22/2022 0827  Gross per 24 hour   Intake 230 ml   Output --   Net 230 ml         Physical Exam:   Blood pressure 98/86, pulse 61, temperature 97.7 °F (36.5 °C), resp. rate 16, height 5' 8\" (1.727 m), weight 68.4 kg (150 lb 12.7 oz), SpO2 99 %. General:    Frail. Alert. No acute distress. Oriented to self only, calm  Head:  Normocephalic, atraumatic  Eyes:  Sclerae appear normal.  Pupils equally round. Glasses intact  ENT:  Nares appear normal, no drainage. Moist oral mucosa  Neck:  No restricted ROM. Trachea midline   CV:   RRR. No m/r/g. No jugular venous distension. Lungs:   CTAB. No wheezing, rhonchi, or rales. Respirations even, unlabored on RA  Abdomen: Bowel sounds present. Soft, nontender, nondistended. Extremities: No cyanosis or clubbing. No edema  Skin:     No rashes and normal coloration. Warm and dry. Neuro:  CN II-XII grossly intact. Sensation intact. A&O to self only. Calm, cooperative, follows commands and moves all 4 extremities. Psych:  Normal mood and affect.       I have reviewed ordered lab tests and independently visualized imaging below:    Recent Labs:  Recent Results (from the past 48 hour(s))   CBC WITH AUTOMATED DIFF    Collection Time: 01/21/22  6:50 AM   Result Value Ref Range    WBC 5.8 4.3 - 11.1 K/uL    RBC 4.29 4.23 - 5.6 M/uL    HGB 12.9 (L) 13.6 - 17.2 g/dL    HCT 39.4 (L) 41.1 - 50.3 %    MCV 91.8 79.6 - 97.8 FL    MCH 30.1 26.1 - 32.9 PG    MCHC 32.7 31.4 - 35.0 g/dL    RDW 11.9 11.9 - 14.6 %    PLATELET 954 (L) 254 - 450 K/uL    MPV 11.6 9.4 - 12.3 FL    ABSOLUTE NRBC 0.00 0.0 - 0.2 K/uL    DF AUTOMATED      NEUTROPHILS 48 43 - 78 %    LYMPHOCYTES 34 13 - 44 %    MONOCYTES 10 4.0 - 12.0 %    EOSINOPHILS 7 0.5 - 7.8 %    BASOPHILS 1 0.0 - 2.0 %    IMMATURE GRANULOCYTES 0 0.0 - 5.0 %    ABS. NEUTROPHILS 2.8 1.7 - 8.2 K/UL    ABS. LYMPHOCYTES 2.0 0.5 - 4.6 K/UL    ABS. MONOCYTES 0.6 0.1 - 1.3 K/UL    ABS. EOSINOPHILS 0.4 0.0 - 0.8 K/UL    ABS. BASOPHILS 0.1 0.0 - 0.2 K/UL    ABS. IMM. GRANS. 0.0 0.0 - 0.5 K/UL   METABOLIC PANEL, BASIC    Collection Time: 01/21/22  6:50 AM   Result Value Ref Range    Sodium 141 138 - 145 mmol/L    Potassium 3.6 3.5 - 5.1 mmol/L    Chloride 110 (H) 98 - 107 mmol/L    CO2 25 21 - 32 mmol/L    Anion gap 6 (L) 7 - 16 mmol/L    Glucose 85 65 - 100 mg/dL    BUN 24 (H) 8 - 23 MG/DL    Creatinine 0.78 (L) 0.8 - 1.5 MG/DL    GFR est AA >60 >60 ml/min/1.73m2    GFR est non-AA >60 >60 ml/min/1.73m2    Calcium 9.0 8.3 - 10.4 MG/DL   CBC WITH AUTOMATED DIFF    Collection Time: 01/22/22  5:33 AM   Result Value Ref Range    WBC 7.5 4.3 - 11.1 K/uL    RBC 4.72 4.23 - 5.6 M/uL    HGB 14.6 13.6 - 17.2 g/dL    HCT 43.9 41.1 - 50.3 %    MCV 93.0 79.6 - 97.8 FL    MCH 30.9 26.1 - 32.9 PG    MCHC 33.3 31.4 - 35.0 g/dL    RDW 11.5 (L) 11.9 - 14.6 %    PLATELET 119 552 - 490 K/uL    MPV 11.7 9.4 - 12.3 FL    ABSOLUTE NRBC 0.00 0.0 - 0.2 K/uL    DF AUTOMATED      NEUTROPHILS 52 43 - 78 %    LYMPHOCYTES 29 13 - 44 %    MONOCYTES 10 4.0 - 12.0 %    EOSINOPHILS 9 (H) 0.5 - 7.8 %    BASOPHILS 1 0.0 - 2.0 %    IMMATURE GRANULOCYTES 0 0.0 - 5.0 %    ABS. NEUTROPHILS 3.9 1.7 - 8.2 K/UL    ABS. LYMPHOCYTES 2.2 0.5 - 4.6 K/UL    ABS. MONOCYTES 0.7 0.1 - 1.3 K/UL    ABS. EOSINOPHILS 0.7 0.0 - 0.8 K/UL    ABS. BASOPHILS 0.1 0.0 - 0.2 K/UL    ABS. IMM. GRANS. 0.0 0.0 - 0.5 K/UL   METABOLIC PANEL, BASIC    Collection Time: 01/22/22  5:33 AM   Result Value Ref Range    Sodium 140 138 - 145 mmol/L    Potassium 3.4 (L) 3.5 - 5.1 mmol/L    Chloride 107 98 - 107 mmol/L    CO2 28 21 - 32 mmol/L    Anion gap 5 (L) 7 - 16 mmol/L    Glucose 88 65 - 100 mg/dL    BUN 20 8 - 23 MG/DL    Creatinine 0.78 (L) 0.8 - 1.5 MG/DL    GFR est AA >60 >60 ml/min/1.73m2    GFR est non-AA >60 >60 ml/min/1.73m2    Calcium 9.4 8.3 - 10.4 MG/DL   MAGNESIUM    Collection Time: 01/22/22  5:33 AM   Result Value Ref Range    Magnesium 2.2 1.8 - 2.4 mg/dL   SARS-COV-2    Collection Time: 01/22/22  6:35 AM   Result Value Ref Range    SARS-CoV-2 Nasopharyngeal     SARS-COV-2, PCR    Collection Time: 01/22/22  6:35 AM    Specimen: Nasopharyngeal   Result Value Ref Range    Specimen source Nasopharyngeal      SARS-CoV-2 Not detected NOTD     COVID-19 RAPID TEST    Collection Time: 01/22/22  6:35 AM   Result Value Ref Range    Specimen source Nasopharyngeal      COVID-19 rapid test Not detected NOTD         All Micro Results     Procedure Component Value Units Date/Time    SARS-COV-2, PCR [[id number]] Collected: 01/22/22 7397    Order Status: Completed Specimen: Nasopharyngeal Updated: 01/22/22 1229     Specimen source Nasopharyngeal        SARS-CoV-2 Not detected        Comment:      The specimen is NEGATIVE for SARS-CoV-2, the novel coronavirus associated with COVID-19. This test has been authorized by the FDA under an Emergency Use Authorization (EUA) for use by authorized laboratories.         Fact sheet for Healthcare Providers: ConventionUpdate.co.nz       Fact sheet for Patients: ConventionUpdate.co.nz       Methodology: RT-PCR         COVID-19 RAPID TEST [[id number]] Collected: 01/22/22 7278    Order Status: Completed Specimen: Nasopharyngeal Updated: 01/22/22 1716 Specimen source Nasopharyngeal        COVID-19 rapid test Not detected        Comment:      The specimen is NEGATIVE for SARS-CoV-2, the novel coronavirus associated with COVID-19. A negative result does not rule out COVID-19. This test has been authorized by the FDA under an Emergency Use Authorization (EUA) for use by authorized laboratories. Fact sheet for Healthcare Providers: FIRE1  Fact sheet for Patients: simplifyMD.       Methodology: Isothermal Nucleic Acid Amplification         SARS-COV-2, PCR [[id number]] Collected: 01/19/22 1519    Order Status: Completed Specimen: Nasopharyngeal Updated: 01/20/22 0132     Specimen source Nasopharyngeal        SARS-CoV-2 Not detected        Comment:      The specimen is NEGATIVE for SARS-CoV-2, the novel coronavirus associated with COVID-19. This test has been authorized by the FDA under an Emergency Use Authorization (EUA) for use by authorized laboratories.         Fact sheet for Healthcare Providers: FIRE1       Fact sheet for Patients: simplifyMD.       Methodology: RT-PCR         BLOOD CULTURE [[id number]] Collected: 01/14/22 0532    Order Status: Completed Specimen: Blood Updated: 01/19/22 0742     Special Requests: --        LEFT  Antecubital       Culture result: NO GROWTH 5 DAYS       BLOOD CULTURE [[id number]] Collected: 01/13/22 1732    Order Status: Completed Specimen: Blood Updated: 01/18/22 0624     Special Requests: FOREARM        Culture result: NO GROWTH 5 DAYS       RESPIRATORY VIRUS PANEL W/COVID-19, PCR [[id number]] Collected: 01/13/22 2100    Order Status: Completed Specimen: Nasopharyngeal Updated: 01/13/22 2218     Adenovirus NOT DETECTED        Coronavirus 229E NOT DETECTED        Coronavirus HKU1 NOT DETECTED        Coronavirus CVNL63 NOT DETECTED        Coronavirus OC43 NOT DETECTED        SARS-CoV-2, PCR NOT DETECTED        Metapneumovirus NOT DETECTED        Rhinovirus and Enterovirus NOT DETECTED        Influenza A NOT DETECTED        Influenza B NOT DETECTED        Parainfluenza 1 NOT DETECTED        Parainfluenza 2 NOT DETECTED        Parainfluenza 3 NOT DETECTED        Parainfluenza virus 4 NOT DETECTED        RSV by PCR NOT DETECTED        B. parapertussis, PCR NOT DETECTED        Bordetella pertussis - PCR NOT DETECTED        Chlamydophila pneumoniae DNA, QL, PCR NOT DETECTED        Mycoplasma pneumoniae DNA, QL, PCR NOT DETECTED       COVID-19 RAPID TEST [[id number]] Collected: 01/13/22 1732    Order Status: Completed Specimen: Nasopharyngeal Updated: 01/13/22 1816     Specimen source NASAL        Comment: RAPID ONLY        COVID-19 rapid test Not detected        Comment:      The specimen is NEGATIVE for SARS-CoV-2, the novel coronavirus associated with COVID-19. A negative result does not rule out COVID-19. This test has been authorized by the FDA under an Emergency Use Authorization (EUA) for use by authorized laboratories. Fact sheet for Healthcare Providers: ConventionAntibe Therapeuticsdate.co.nz  Fact sheet for Patients: ConventionAntibe Therapeuticsdate.co.nz       Methodology: Isothermal Nucleic Acid Amplification               Other Studies:  No results found.     Current Meds:  Current Facility-Administered Medications   Medication Dose Route Frequency    potassium chloride (K-DUR, KLOR-CON M20) SR tablet 40 mEq  40 mEq Oral BID    lisinopriL (PRINIVIL, ZESTRIL) tablet 40 mg  40 mg Oral DAILY    metoprolol tartrate (LOPRESSOR) tablet 12.5 mg  12.5 mg Oral BID    cloNIDine HCL (CATAPRES) tablet 0.1 mg  0.1 mg Oral Q4H PRN    apixaban (ELIQUIS) tablet 5 mg  5 mg Oral BID    metoprolol (LOPRESSOR) injection 5 mg  5 mg IntraVENous Q6H PRN    carbidopa-levodopa (PARCOPA)  mg rapid dissolve tablet 1.5 Tablet  1.5 Tablet Oral PRN    zonisamide (ZONEGRAN) capsule 25 mg 25 mg Oral DAILY    influenza vaccine 2021-22 (6 mos+)(PF) (FLUARIX/FLULAVAL/FLUZONE QUAD) injection 0.5 mL  1 Each IntraMUSCular PRIOR TO DISCHARGE    QUEtiapine (SEROquel) tablet 50 mg  50 mg Oral BID    sodium chloride (NS) flush 5-40 mL  5-40 mL IntraVENous Q8H    sodium chloride (NS) flush 5-40 mL  5-40 mL IntraVENous PRN    acetaminophen (TYLENOL) tablet 650 mg  650 mg Oral Q6H PRN    Or    acetaminophen (TYLENOL) suppository 650 mg  650 mg Rectal Q6H PRN    polyethylene glycol (MIRALAX) packet 17 g  17 g Oral DAILY PRN    ondansetron (ZOFRAN ODT) tablet 4 mg  4 mg Oral Q8H PRN    Or    ondansetron (ZOFRAN) injection 4 mg  4 mg IntraVENous Q6H PRN    carbidopa-levodopa ER (SINEMET CR)  mg per tablet 2 Tablet  2 Tablet Oral QHS    pimavanserin cap 34 mg (Nuplazid) pt supplied (Patient Supplied)  34 mg Oral DAILY    carbidopa-levodopa (SINEMET)  mg per tablet 1.5 Tablet  1.5 Tablet Oral QID     Labs, vital signs, diagnostic testing reviewed. Case discussed with patient, care team, Dr. Danica Barker. Signed:  Jose Carlos Lawrence NP    Part of this note may have been written by using a voice dictation software. The note has been proof read but may still contain some grammatical/other typographical errors.

## 2022-01-22 NOTE — PROGRESS NOTES
Problem: Pressure Injury - Risk of  Goal: *Prevention of pressure injury  Description: Document Volodymyr Scale and appropriate interventions in the flowsheet. Outcome: Progressing Towards Goal  Note: Pressure Injury Interventions:  Sensory Interventions: Assess changes in LOC    Moisture Interventions: Absorbent underpads    Activity Interventions: Increase time out of bed,PT/OT evaluation    Mobility Interventions: HOB 30 degrees or less,PT/OT evaluation    Nutrition Interventions: Offer support with meals,snacks and hydration,Document food/fluid/supplement intake    Friction and Shear Interventions: HOB 30 degrees or less                Problem: Patient Education: Go to Patient Education Activity  Goal: Patient/Family Education  Outcome: Progressing Towards Goal     Problem: Falls - Risk of  Goal: *Absence of Falls  Description: Document Marlin Fall Risk and appropriate interventions in the flowsheet.   Outcome: Progressing Towards Goal  Note: Fall Risk Interventions:  Mobility Interventions: Bed/chair exit alarm    Mentation Interventions: Bed/chair exit alarm    Medication Interventions: Bed/chair exit alarm    Elimination Interventions: Bed/chair exit alarm,Call light in reach    History of Falls Interventions: Bed/chair exit alarm,Door open when patient unattended         Problem: Patient Education: Go to Patient Education Activity  Goal: Patient/Family Education  Outcome: Progressing Towards Goal     Problem: Patient Education: Go to Patient Education Activity  Goal: Patient/Family Education  Outcome: Progressing Towards Goal     Problem: Altered Thought Process (Adult/Pediatric)  Goal: *STG: Participates in treatment plan  Outcome: Progressing Towards Goal  Goal: *STG: Remains safe in hospital  Outcome: Progressing Towards Goal  Goal: *STG: Complies with medication therapy  Outcome: Progressing Towards Goal  Goal: *STG: Decreased delusional thinking  Outcome: Progressing Towards Goal  Goal: *STG: Decreased hallucinations  Outcome: Progressing Towards Goal  Goal: *LTG: Returns to baseline functioning  Outcome: Progressing Towards Goal  Goal: Interventions  Outcome: Progressing Towards Goal     Problem: Patient Education: Go to Patient Education Activity  Goal: Patient/Family Education  Outcome: Progressing Towards Goal

## 2022-01-23 LAB
ANION GAP SERPL CALC-SCNC: 6 MMOL/L (ref 7–16)
BUN SERPL-MCNC: 26 MG/DL (ref 8–23)
CALCIUM SERPL-MCNC: 9.2 MG/DL (ref 8.3–10.4)
CHLORIDE SERPL-SCNC: 109 MMOL/L (ref 98–107)
CO2 SERPL-SCNC: 27 MMOL/L (ref 21–32)
CREAT SERPL-MCNC: 1.36 MG/DL (ref 0.8–1.5)
GLUCOSE SERPL-MCNC: 85 MG/DL (ref 65–100)
POTASSIUM SERPL-SCNC: 3.9 MMOL/L (ref 3.5–5.1)
SODIUM SERPL-SCNC: 142 MMOL/L (ref 136–145)

## 2022-01-23 PROCEDURE — 74011250637 HC RX REV CODE- 250/637: Performed by: FAMILY MEDICINE

## 2022-01-23 PROCEDURE — 36415 COLL VENOUS BLD VENIPUNCTURE: CPT

## 2022-01-23 PROCEDURE — 74011250637 HC RX REV CODE- 250/637: Performed by: INTERNAL MEDICINE

## 2022-01-23 PROCEDURE — 74011250637 HC RX REV CODE- 250/637: Performed by: NURSE PRACTITIONER

## 2022-01-23 PROCEDURE — 74011000250 HC RX REV CODE- 250: Performed by: FAMILY MEDICINE

## 2022-01-23 PROCEDURE — 74011250637 HC RX REV CODE- 250/637: Performed by: HOSPITALIST

## 2022-01-23 PROCEDURE — 80048 BASIC METABOLIC PNL TOTAL CA: CPT

## 2022-01-23 PROCEDURE — 65270000029 HC RM PRIVATE

## 2022-01-23 RX ORDER — METOPROLOL TARTRATE 25 MG/1
12.5 TABLET, FILM COATED ORAL 2 TIMES DAILY
Qty: 30 TABLET | Refills: 0 | Status: SHIPPED | OUTPATIENT
Start: 2022-01-23 | End: 2022-02-22

## 2022-01-23 RX ORDER — CARBIDOPA AND LEVODOPA 50; 200 MG/1; MG/1
TABLET, EXTENDED RELEASE ORAL
Qty: 180 TABLET | Refills: 3 | Status: SHIPPED | OUTPATIENT
Start: 2022-01-23

## 2022-01-23 RX ORDER — ZONISAMIDE 25 MG/1
25 CAPSULE ORAL DAILY
Qty: 30 CAPSULE | Refills: 0 | Status: SHIPPED | OUTPATIENT
Start: 2022-01-23 | End: 2022-02-22

## 2022-01-23 RX ORDER — QUETIAPINE FUMARATE 25 MG/1
50 TABLET, FILM COATED ORAL 2 TIMES DAILY
Qty: 90 TABLET | Refills: 2 | Status: SHIPPED | OUTPATIENT
Start: 2022-01-23

## 2022-01-23 RX ADMIN — APIXABAN 5 MG: 5 TABLET, FILM COATED ORAL at 08:27

## 2022-01-23 RX ADMIN — CARBIDOPA AND LEVODOPA 1.5 TABLET: 25; 100 TABLET ORAL at 22:19

## 2022-01-23 RX ADMIN — METOPROLOL TARTRATE 12.5 MG: 25 TABLET, FILM COATED ORAL at 08:27

## 2022-01-23 RX ADMIN — APIXABAN 5 MG: 5 TABLET, FILM COATED ORAL at 22:21

## 2022-01-23 RX ADMIN — CARBIDOPA AND LEVODOPA 1.5 TABLET: 25; 100 TABLET ORAL at 12:37

## 2022-01-23 RX ADMIN — CLONIDINE HYDROCHLORIDE 0.1 MG: 0.1 TABLET ORAL at 22:20

## 2022-01-23 RX ADMIN — CARBIDOPA AND LEVODOPA 1.5 TABLET: 25; 100 TABLET ORAL at 08:27

## 2022-01-23 RX ADMIN — QUETIAPINE FUMARATE 50 MG: 100 TABLET ORAL at 12:37

## 2022-01-23 RX ADMIN — LISINOPRIL 40 MG: 20 TABLET ORAL at 08:27

## 2022-01-23 RX ADMIN — CARBIDOPA AND LEVODOPA 1.5 TABLET: 25; 100 TABLET ORAL at 16:44

## 2022-01-23 RX ADMIN — METOPROLOL TARTRATE 12.5 MG: 25 TABLET, FILM COATED ORAL at 17:54

## 2022-01-23 RX ADMIN — ZONISAMIDE 25 MG: 25 CAPSULE ORAL at 08:27

## 2022-01-23 RX ADMIN — CARBIDOPA AND LEVODOPA 2 TABLET: 25; 100 TABLET, EXTENDED RELEASE ORAL at 22:20

## 2022-01-23 RX ADMIN — SODIUM CHLORIDE, PRESERVATIVE FREE 10 ML: 5 INJECTION INTRAVENOUS at 14:00

## 2022-01-23 RX ADMIN — SODIUM CHLORIDE, PRESERVATIVE FREE 5 ML: 5 INJECTION INTRAVENOUS at 06:04

## 2022-01-23 RX ADMIN — SODIUM CHLORIDE, PRESERVATIVE FREE 10 ML: 5 INJECTION INTRAVENOUS at 22:19

## 2022-01-23 RX ADMIN — QUETIAPINE FUMARATE 50 MG: 100 TABLET ORAL at 22:20

## 2022-01-23 NOTE — DISCHARGE SUMMARY
Hospitalist Discharge Summary   Admit Date:  2022  5:12 PM   DC Note date: 2022  Name:  Stiven Norton   Age:  82 y.o. Sex:  male  :  1935   MRN:  [id number]   Room:  [facility]  PCP:  Tarik Elaine MD    Presenting Complaint: Altered mental status    Initial Admission Diagnosis: Metabolic encephalopathy [A85.26]  Bacterial pneumonia [J15.9]  Parkinson disease ([facility] [G20]  Acute encephalopathy [G93.40]     Problem List for this Hospitalization:  Hospital Problems as of 2022 Date Reviewed: 2022          Codes Class Noted - Resolved POA    Acute encephalopathy ICD-10-CM: G93.40  ICD-9-CM: 348.30  1/15/2022 - Present Unknown        * (Principal) Metabolic encephalopathy VHD-[id number]: G93.41  ICD-9-CM: 348.31  2022 - Present Unknown        Bacterial pneumonia ICD-10-CM: J15.9  ICD-9-CM: 482.9  2022 - Present Unknown        Parkinson disease ([facility] ICD-10-CM: Xochitl Tippah  ICD-9-CM: 332.0  2016 - Present Unknown        Tremor ICD-10-CM: R25.1  ICD-9-CM: 781.0  2016 - Present Yes        Hyperlipidemia ICD-10-CM: E78.5  ICD-9-CM: 272.4  2016 - Present Yes        Parkinson's disease ([facility] ICD-10-CM: Xochitl Tippah  ICD-9-CM: 332.0  2016 - Present Yes    Overview Signed 2016  1:54 PM by Ana Cristina COLLADO     Overview:   (Dr. Barbara Nance)    Last Assessment & Plan:   States that he received a letter that his neurologist, Dr. Rose Thompson has moved.   Will arrange f/u appt with a different neurologist.               HTN (hypertension) ICD-10-CM: I10  ICD-9-CM: 401.9  2013 - Present Yes        Malignant neoplasm of prostate ([facility] ICD-10-CM: C61  ICD-9-CM: 185  2010 - Present Yes    Overview Signed 2016  1:54 PM by Ana Cristina COLLADO     Overview:   (Dr. Sherrell Sandoval)                 Did Patient have Sepsis (YES OR NO): NO    Hospital Course:  Patient is an 83 y/o male with PMH prostate cancer, CAD s/p CABG, HTN, HLD, Parkinson's disease with associated psychosis who presented to ED 1/13 for cc AMS. Neurology evaluated patient for medications recommendations. Patient completed antibiotic course for possible CAP. Palliative care did evaluate patient; hospice will be considered if he does not improve at rehab per spouse. Patient with Advanced Parkinson's Disease, follows neurology outpatient. Neurology evaluated inpatient and have recommended the following medication recommendations to be continued at discharge:  -Continue Carbidopa-levodopa 25/100 mg  1.5 tablets every four times daily at 8am, 12pm, 4pm, 8pm.  -Continue Carbidopa-levodopa CR 50/200mg 1 tab Qhs   -Continue Seroquel 50 mg at noon and 50mg at qhs.   -Continue Zonisamide 25 mg po every day (per Dr. Koko Brantley)  -Nuplazid 34mg Q day. Family to provide this medication.  -Neurology discontinued Azilact due to AMS/Hallucinations. Per neurology, do not stop medications. Outpatient neurology f/u in 1 month. Patient with new onset atrial fibrillation during hospitalization although converted spontaneously back to NSR. TSH and lyes wnl, Echo unremarkable for this matter, shows diastolic dysfunction. Patient initiated on Eliquis and BB although BB dose reduced due to bradycardia. Patient currently NSR at time of discharge. Patient cleared for discharge to skilled nursing facility 1/21 but could not transfer due to restraint use 1/19/21. Patient currently remains out of restraints on day of discharge. Patient remains hemodynamically stable on RA. PCP follow-up with repeat BMP recommended in 1 week. Disposition: Skilled Nursing Facility  Diet: ADULT DIET Easy to Chew; Low Fat/Low Chol/High Fiber/2 gm Na  Code Status: DNR    Follow Up Orders: Follow-up Appointments   Procedures    FOLLOW UP VISIT Appointment in: One Week PCP 1 week Neurology 1 month     PCP 1 week  Neurology 1 month     Standing Status:   Standing     Number of Occurrences:   1     Order Specific Question:   Appointment in     Answer:    One Week       Follow-up Information     Follow up With Specialties Details Why Contact Info    [address] [facility], [facility]   [address] [facility] [phone]  [address] [place] [address], Catrachito Sargent MD Internal Medicine   [facility]  [phone]            Follow up labs/diagnostics (ultimately defer to outpatient provider): BMP 1 week    Time spent in patient discharge and coordination 35 minutes. Plan was discussed with patient, care team, Dr. Trever Wilder. All questions answered. Patient was stable at time of discharge. Instructions given to call a physician or return if any concerns. Discharge Info:   Current Discharge Medication List      START taking these medications    Details   apixaban (ELIQUIS) 5 mg tablet Take 1 Tablet by mouth two (2) times a day for 30 days. Qty: 60 Tablet, Refills: 0  Start date: 1/23/2022, End date: 2/22/2022      zonisamide (ZONEGRAN) 25 mg capsule Take 1 Capsule by mouth daily for 30 days. Qty: 30 Capsule, Refills: 0  Start date: 1/23/2022, End date: 2/22/2022      metoprolol tartrate (LOPRESSOR) 25 mg tablet Take 0.5 Tablets by mouth two (2) times a day for 30 days. Qty: 30 Tablet, Refills: 0  Start date: 1/23/2022, End date: 2/22/2022         CONTINUE these medications which have CHANGED    Details   carbidopa-levodopa ER (SINEMET CR)  mg per tablet TAKE 1 TABLET BY MOUTH NIGHTLY  Qty: 180 Tablet, Refills: 3  Start date: 1/23/2022    Associated Diagnoses: Parkinson disease ([facility]      QUEtiapine (SEROquel) 25 mg tablet Take 2 Tablets by mouth two (2) times a day. Take 2 tablets at noon and 2 tablets QHS. Qty: 90 Tablet, Refills: 2  Start date: 1/23/2022    Associated Diagnoses: Parkinson disease ([facility]  Anxiety         CONTINUE these medications which have NOT CHANGED    Details   donepeziL (ARICEPT) 10 mg tablet TAKE 1 TABLET BY MOUTH EVERY DAY  Qty: 90 Tablet, Refills: 0      pimavanserin (Nuplazid) 34 mg cap Take 1 Capsule by mouth daily. Qty: 90 Capsule, Refills: 3      carbidopa-levodopa (SINEMET)  mg per tablet TAKE 1 & 1/2 (ONE & ONE-HALF) TABLETS BY MOUTH 4 TIMES DAILY AT 8AM, NOON, 4PM AND 8PM.  Qty: 540 Tab, Refills: 0    Associated Diagnoses: Parkinson disease (HCC)      cloNIDine HCL (CATAPRES) 0.1 mg tablet Take 0.1 mg by mouth two (2) times a day. cholecalciferol (VITAMIN D3) 1,000 unit cap Take  by mouth daily. atorvastatin (LIPITOR) 80 mg tablet Take 80 mg by mouth nightly. Aspirin, Buffered 81 mg tab Take  by mouth. OTHER PT for postural imbalance and posture  SPeech therapy for hypophonia  Qty: 1 Act, Refills: 0      nitroglycerin (NITROSTAT) 0.4 mg SL tablet by SubLINGual route every five (5) minutes as needed for Chest Pain. STOP taking these medications       rasagiline (AZILECT) 1 mg tablet Comments:   Reason for Stopping:               Procedures done this admission:  * No surgery found *    Consults this admission:  IP CONSULT TO NEUROLOGY  IP CONSULT TO PALLIATIVE CARE - PROVIDER    Echocardiogram/EKG results:  Results from Hospital Encounter encounter on 01/13/22    ECHO ADULT COMPLETE    Interpretation Summary    Left Ventricle: Left ventricle size is normal. Mildly increased wall thickness. Normal wall motion. Normal left ventricular systolic function.   Aortic Valve: Mildly calcified cusps. Mild stenosis. AV mean gradient is 13 mmHg. AV peak gradient is 25 mmHg.   Mitral Valve: Mild mitral annular calcification. Mild transvalvular regurgitation.   Left Atrium: Left atrium is moderately dilated.        EKG Results     Procedure 720 Value Units Date/Time    EKG, 12 LEAD, SUBSEQUENT [[id number]] Collected: 01/16/22 1400    Order Status: Completed Updated: 01/16/22 1721     Ventricular Rate 69 BPM      Atrial Rate 276 BPM      QRS Duration 144 ms      Q-T Interval 480 ms      QTC Calculation (Bezet) 514 ms      Calculated P Axis 93 degrees      Calculated R Axis -9 degrees      Calculated T Axis 19 degrees      Diagnosis --     Atrial flutter with 4:1 A-V conduction  Right bundle branch block  Minimal voltage criteria for LVH, may be normal variant  Inferior infarct , age undetermined  Anterolateral infarct , age undetermined  Abnormal ECG  When compared with ECG of prior   Attrial flutter has replaced sinus rhythm    Confirmed by Beverley Gonzalez MD ([id number]) on 1/16/2022 5:21:24 PM      EKG, 12 LEAD, INITIAL [[id number]] Collected: 01/15/22 0634    Order Status: Completed Updated: 01/15/22 1845     Ventricular Rate 67 BPM      Atrial Rate 67 BPM      P-R Interval 156 ms      QRS Duration 154 ms      Q-T Interval 468 ms      QTC Calculation (Bezet) 494 ms      Calculated P Axis 45 degrees      Calculated R Axis -23 degrees      Calculated T Axis -15 degrees      Diagnosis --     Normal sinus rhythm  Right bundle branch block  Minimal voltage criteria for LVH, may be normal variant  Inferior infarct , age undetermined  When compared with ECG of 13-JAN-2022 17:39,  No significant change was found  Confirmed by Dineen Roche MD, Cherylene Due [id number]) on 1/15/2022 6:44:57 PM      EKG, 12 LEAD, INITIAL [[id number]]     Order Status: Canceled     EKG, 12 LEAD, INITIAL [[id number]] Collected: 01/13/22 1739    Order Status: Completed Updated: 01/14/22 1253     Ventricular Rate 83 BPM      Atrial Rate 83 BPM      P-R Interval 162 ms      QRS Duration 159 ms      Q-T Interval 419 ms      QTC Calculation (Bezet) 493 ms      Calculated P Axis 73 degrees      Calculated R Axis -11 degrees      Calculated T Axis 17 degrees      Diagnosis --     Sinus rhythm  Atrial premature complex  Right bundle branch block  Inferior infarct, age indeterminate  Anterolateral infarct, age indeterminate    Confirmed by Dineen Roche MD, Cherylene Due ([id number]) on 1/14/2022 12:53:36 PM            Diagnostic Imaging/Tests:   XR HAND LT MIN 3 V    Result Date: 1/13/2022  Left hand INDICATION: Left hand injury, pain Three views of the left hand were obtained. FINDINGS: There is no evidence of fracture or dislocation. There are no bony erosions. Joint space narrowing in all 5 digits. .     Osteoarthritis. No acute findings. CT HEAD WO CONT    Result Date: 1/13/2022  Head CT INDICATION: Altered mental status Multiple axial images obtained through the brain without intravenous contrast. Radiation dose reduction techniques were used for this study: All CT scans performed at this facility use one or all of the following: Automated exposure control, adjustment of the mA and/or kVp according to patient's size, iterative reconstruction. FINDINGS: There is mild chronic change in the white matter and basal ganglia. There is no CT evidence of acute hemorrhage or infarction. The ventricles are normal in size. There are no extra-axial fluid collections. No masses are seen. The sinuses are clear. There are no bony lesions. No CT evidence of acute intracranial abnormality. XR CHEST PORT    Result Date: 1/13/2022  CHEST X-RAY, single portable view  1/13/2022 History: Punched a chair and having hallucinations. Technique: Single frontal view of the chest. Comparison: None. Findings: The patient is status post median sternotomy. The cardiac silhouette is mildly enlarged on this AP study. The lungs are expanded without evidence for pneumothorax. No dense consolidative airspace process or pleural effusion is seen. However, mild peripheral opacities are seen in the bilateral mid and lower lung fields for which an early peripheral infiltrate cannot be excluded. 1.  Mild peripheral opacities in the mid and lower lung fields. An early or mild peripheral infiltrate as can occur with a viral pneumonia such as Covid 19 is not excluded. This could either represent active infection or post inflammatory changes if the patient has recently had Covid pneumonia. Recommend clinical correlation.  This report was made using voice transcription. Despite my best efforts to avoid any, transcription errors may persist. If there is any question about the accuracy of the report or need for clarification, then please call [phone], or text me through perfectserv for clarification or correction. All Micro Results     Procedure Component Value Units Date/Time    SARS-COV-2, PCR [[id number]] Collected: 01/22/22 7616    Order Status: Completed Specimen: Nasopharyngeal Updated: 01/22/22 1229     Specimen source Nasopharyngeal        SARS-CoV-2 Not detected        Comment:      The specimen is NEGATIVE for SARS-CoV-2, the novel coronavirus associated with COVID-19. This test has been authorized by the FDA under an Emergency Use Authorization (EUA) for use by authorized laboratories. Fact sheet for Healthcare Providers: [employer]       Fact sheet for Patients: [employer]       Methodology: RT-PCR         COVID-19 RAPID TEST [[id number]] Collected: 01/22/22 4855    Order Status: Completed Specimen: Nasopharyngeal Updated: 01/22/22 0702     Specimen source Nasopharyngeal        COVID-19 rapid test Not detected        Comment:      The specimen is NEGATIVE for SARS-CoV-2, the novel coronavirus associated with COVID-19. A negative result does not rule out COVID-19. This test has been authorized by the FDA under an Emergency Use Authorization (EUA) for use by authorized laboratories.         Fact sheet for Healthcare Providers: [employer]  Fact sheet for Patients: [employer]       Methodology: Isothermal Nucleic Acid Amplification         SARS-COV-2, PCR [[id number]] Collected: 01/19/22 1519    Order Status: Completed Specimen: Nasopharyngeal Updated: 01/20/22 0132     Specimen source Nasopharyngeal        SARS-CoV-2 Not detected        Comment:      The specimen is NEGATIVE for SARS-CoV-2, the novel coronavirus associated with COVID-19. This test has been authorized by the FDA under an Emergency Use Authorization (EUA) for use by authorized laboratories.         Fact sheet for Healthcare Providers: [employer]       Fact sheet for Patients: [employer]       Methodology: RT-PCR         BLOOD CULTURE [[id number]] Collected: 01/14/22 0532    Order Status: Completed Specimen: Blood Updated: 01/19/22 0742     Special Requests: --        LEFT  Antecubital       Culture result: NO GROWTH 5 DAYS       BLOOD CULTURE [[id number]] Collected: 01/13/22 1732    Order Status: Completed Specimen: Blood Updated: 01/18/22 0624     Special Requests: FOREARM        Culture result: NO GROWTH 5 DAYS       RESPIRATORY VIRUS PANEL W/COVID-19, PCR [[id number]] Collected: 01/13/22 2100    Order Status: Completed Specimen: Nasopharyngeal Updated: 01/13/22 2218     Adenovirus NOT DETECTED        Coronavirus 229E NOT DETECTED        Coronavirus HKU1 NOT DETECTED        Coronavirus CVNL63 NOT DETECTED        Coronavirus OC43 NOT DETECTED        SARS-CoV-2, PCR NOT DETECTED        Metapneumovirus NOT DETECTED        Rhinovirus and Enterovirus NOT DETECTED        Influenza A NOT DETECTED        Influenza B NOT DETECTED        Parainfluenza 1 NOT DETECTED        Parainfluenza 2 NOT DETECTED        Parainfluenza 3 NOT DETECTED        Parainfluenza virus 4 NOT DETECTED        RSV by PCR NOT DETECTED        B. parapertussis, PCR NOT DETECTED        Bordetella pertussis - PCR NOT DETECTED        Chlamydophila pneumoniae DNA, QL, PCR NOT DETECTED        Mycoplasma pneumoniae DNA, QL, PCR NOT DETECTED       COVID-19 RAPID TEST [[id number]] Collected: 01/13/22 1732    Order Status: Completed Specimen: Nasopharyngeal Updated: 01/13/22 1816     Specimen source NASAL        Comment: RAPID ONLY        COVID-19 rapid test Not detected        Comment:      The specimen is NEGATIVE for SARS-CoV-2, the novel coronavirus associated with COVID-19. A negative result does not rule out COVID-19. This test has been authorized by the FDA under an Emergency Use Authorization (EUA) for use by authorized laboratories. Fact sheet for Healthcare Providers: [employer]  Fact sheet for Patients: [employer]       Methodology: Isothermal Nucleic Acid Amplification               Labs: Results:       BMP, Mg, Phos Recent Labs     01/23/22  0513 01/22/22  0533 01/21/22  0650    140 141   K 3.9 3.4* 3.6   * 107 110*   CO2 27 28 25   AGAP 6* 5* 6*   BUN 26* 20 24*   CREA 1.36 0.78* 0.78*   CA 9.2 9.4 9.0   GLU 85 88 85   MG  --  2.2  --       CBC Recent Labs     01/22/22  0533 01/21/22  0650   WBC 7.5 5.8   RBC 4.72 4.29   HGB 14.6 12.9*   HCT 43.9 39.4*    148*   GRANS 52 48   LYMPH 29 34   EOS 9* 7   MONOS 10 10   BASOS 1 1   IG 0 0   ANEU 3.9 2.8   ABL 2.2 2.0   KEITH 0.7 0.4   ABM 0.7 0.6   ABB 0.1 0.1   AIG 0.0 0.0      LFT No results for input(s): ALT, TBIL, AP, TP, ALB, GLOB, AGRAT in the last 72 hours.     No lab exists for component: SGOT, GPT   Cardiac Testing No results found for: BNPP, BNP, CPK, RCK1, RCK2, RCK3, RCK4, CKMB, CKNDX, CKND1, TROPT, TROIQ   Coagulation Tests Lab Results   Component Value Date/Time    Prothrombin time 10.1 05/06/2010 01:15 PM    INR 1.0 05/06/2010 01:15 PM    aPTT 25.0 05/06/2010 01:15 PM      A1c No results found for: HBA1C, [id number]   Lipid Panel No results found for: CHOL, CHOLPOCT, CHOLX, CHLST, CHOLV, 165462, HDL, HDLP, LDL, LDLC, DLDLP, 102391, VLDLC, VLDL, TGLX, TRIGL, TRIGP, TGLPOCT, CHHD, CHHDX   Thyroid Panel Lab Results   Component Value Date/Time    TSH 1.400 01/13/2022 05:32 PM    T4, Free 1.0 01/13/2022 05:32 PM        Most Recent UA Lab Results   Component Value Date/Time    Color YELLOW 01/13/2022 05:44 PM    Appearance CLEAR 01/13/2022 05:44 PM    pH (UA) 5.5 01/13/2022 05:44 PM    Protein TRACE (A) 01/13/2022 05:44 PM    Glucose Negative 01/13/2022 05:44 PM    Ketone TRACE (A) 01/13/2022 05:44 PM    Bilirubin Negative 01/13/2022 05:44 PM    Blood Negative 01/13/2022 05:44 PM    Urobilinogen 1.0 01/13/2022 05:44 PM    Nitrites Negative 01/13/2022 05:44 PM    Leukocyte Esterase Negative 01/13/2022 05:44 PM    WBC 0-3 01/13/2022 05:44 PM    RBC 0-3 01/13/2022 05:44 PM    Epithelial cells 0-3 01/13/2022 05:44 PM    Bacteria 0 01/13/2022 05:44 PM    Casts 10-20 01/13/2022 05:44 PM    Mucus 1+ (H) 01/13/2022 05:44 PM          All Labs from Last 24 Hrs:  Recent Results (from the past 24 hour(s))   METABOLIC PANEL, BASIC    Collection Time: 01/23/22  5:13 AM   Result Value Ref Range    Sodium 142 136 - 145 mmol/L    Potassium 3.9 3.5 - 5.1 mmol/L    Chloride 109 (H) 98 - 107 mmol/L    CO2 27 21 - 32 mmol/L    Anion gap 6 (L) 7 - 16 mmol/L    Glucose 85 65 - 100 mg/dL    BUN 26 (H) 8 - 23 MG/DL    Creatinine 1.36 0.8 - 1.5 MG/DL    GFR est AA >60 >60 ml/min/1.73m2    GFR est non-AA 53 (L) >60 ml/min/1.73m2    Calcium 9.2 8.3 - 10.4 MG/DL       Current Med List in Hospital:   Current Facility-Administered Medications   Medication Dose Route Frequency    lisinopriL (PRINIVIL, ZESTRIL) tablet 40 mg  40 mg Oral DAILY    metoprolol tartrate (LOPRESSOR) tablet 12.5 mg  12.5 mg Oral BID    cloNIDine HCL (CATAPRES) tablet 0.1 mg  0.1 mg Oral Q4H PRN    apixaban (ELIQUIS) tablet 5 mg  5 mg Oral BID    metoprolol (LOPRESSOR) injection 5 mg  5 mg IntraVENous Q6H PRN    carbidopa-levodopa (PARCOPA)  mg rapid dissolve tablet 1.5 Tablet  1.5 Tablet Oral PRN    zonisamide (ZONEGRAN) capsule 25 mg  25 mg Oral DAILY    influenza vaccine 2021-22 (6 mos+)(PF) (FLUARIX/FLULAVAL/FLUZONE QUAD) injection 0.5 mL  1 Each IntraMUSCular PRIOR TO DISCHARGE    QUEtiapine (SEROquel) tablet 50 mg  50 mg Oral BID    sodium chloride (NS) flush 5-40 mL  5-40 mL IntraVENous Q8H    sodium chloride (NS) flush 5-40 mL  5-40 mL IntraVENous PRN    acetaminophen (TYLENOL) tablet 650 mg  650 mg Oral Q6H PRN    Or    acetaminophen (TYLENOL) suppository 650 mg  650 mg Rectal Q6H PRN    polyethylene glycol (MIRALAX) packet 17 g  17 g Oral DAILY PRN    ondansetron (ZOFRAN ODT) tablet 4 mg  4 mg Oral Q8H PRN    Or    ondansetron (ZOFRAN) injection 4 mg  4 mg IntraVENous Q6H PRN    carbidopa-levodopa ER (SINEMET CR)  mg per tablet 2 Tablet  2 Tablet Oral QHS    pimavanserin cap 34 mg (Nuplazid) pt supplied (Patient Supplied)  34 mg Oral DAILY    carbidopa-levodopa (SINEMET)  mg per tablet 1.5 Tablet  1.5 Tablet Oral QID       Allergies   Allergen Reactions    Adhesive Tape Rash    Geodon [Ziprasidone Hcl] Other (comments)     Contraindicated with parkinsons     Immunization History   Administered Date(s) Administered    Influenza High Dose Vaccine PF 10/02/2013, 10/23/2015    Influenza Vaccine 10/21/2008    Pneumococcal Conjugate (PCV-13) 04/26/2016    Pneumococcal Polysaccharide (PPSV-23) 10/09/2013    TB Skin Test (PPD) Intradermal 01/14/2022    Zoster Vaccine, Live 02/13/2014       Recent Vital Data:  Patient Vitals for the past 24 hrs:   Temp Pulse Resp BP SpO2   01/23/22 0741 97.6 °F (36.4 °C) 63 18 (!) 141/72 98 %   01/23/22 0500 97.5 °F (36.4 °C) (!) 54 18 138/73 100 %   01/22/22 2359 97.8 °F (36.6 °C) (!) 58 16 (!) 105/57 98 %   01/22/22 2052 97.5 °F (36.4 °C) 66 16 (!) 180/80 100 %   01/22/22 1753    112/66    01/22/22 1454 97.6 °F (36.4 °C) (!) 58 15 109/62 100 %   01/22/22 1102 97.7 °F (36.5 °C) 61 16 98/86 99 %     Oxygen Therapy  O2 Sat (%): 98 % (01/23/22 0741)  Pulse via Oximetry: 64 beats per minute (01/14/22 1039)  O2 Device: None (Room air) (01/22/22 1920)    Estimated body mass index is 22.93 kg/m² as calculated from the following:    Height as of this encounter: 5' 8\" (1.727 m). Weight as of this encounter: 68.4 kg (150 lb 12.7 oz).   No intake or output data in the 24 hours ending 01/23/22 4069      Physical Exam:  General:          Frail. Alert. No acute distress. Oriented to self and location, calm. Head:               Normocephalic, atraumatic  Eyes:               Sclerae appear normal.  Pupils equally round. Glasses intact  ENT:                Nares appear normal, no drainage. Moist oral mucosa  Neck:               No restricted ROM. Trachea midline   CV:                  RRR. No m/r/g. No jugular venous distension. Lungs:             CTAB. No wheezing, rhonchi, or rales. Respirations even, unlabored on RA  Abdomen: Bowel sounds present. Soft, nontender, nondistended. Extremities:     No cyanosis or clubbing. No peripheral edema  Skin:                No rashes and normal coloration. Warm and dry. Neuro:             CN II-XII grossly intact. Sensation intact. A&O to self and location. Calm, cooperative, follows commands and moves all 4 extremities. Psych:             Normal mood and affect. Signed:  Caden Hart NP    Part of this note may have been written by using a voice dictation software. The note has been proof read but may still contain some grammatical/other typographical errors.

## 2022-01-23 NOTE — PROGRESS NOTES
Problem: Pressure Injury - Risk of  Goal: *Prevention of pressure injury  Description: Document Volodymyr Scale and appropriate interventions in the flowsheet. Outcome: Progressing Towards Goal  Note: Pressure Injury Interventions:  Sensory Interventions: Assess changes in LOC    Moisture Interventions: Absorbent underpads,Check for incontinence Q2 hours and as needed    Activity Interventions: Increase time out of bed    Mobility Interventions: Pressure redistribution bed/mattress (bed type)    Nutrition Interventions: Document food/fluid/supplement intake,Offer support with meals,snacks and hydration    Friction and Shear Interventions: HOB 30 degrees or less                Problem: Patient Education: Go to Patient Education Activity  Goal: Patient/Family Education  Outcome: Progressing Towards Goal     Problem: Falls - Risk of  Goal: *Absence of Falls  Description: Document Marlin Fall Risk and appropriate interventions in the flowsheet.   Outcome: Progressing Towards Goal  Note: Fall Risk Interventions:  Mobility Interventions: Bed/chair exit alarm    Mentation Interventions: Bed/chair exit alarm    Medication Interventions: Bed/chair exit alarm    Elimination Interventions: Bed/chair exit alarm    History of Falls Interventions: Bed/chair exit alarm         Problem: Patient Education: Go to Patient Education Activity  Goal: Patient/Family Education  Outcome: Progressing Towards Goal     Problem: Patient Education: Go to Patient Education Activity  Goal: Patient/Family Education  Outcome: Progressing Towards Goal     Problem: Altered Thought Process (Adult/Pediatric)  Goal: *STG: Participates in treatment plan  Outcome: Progressing Towards Goal  Goal: *STG: Remains safe in hospital  Outcome: Progressing Towards Goal  Goal: *STG: Complies with medication therapy  Outcome: Progressing Towards Goal  Goal: *STG: Decreased delusional thinking  Outcome: Progressing Towards Goal  Goal: *STG: Decreased hallucinations  Outcome: Progressing Towards Goal  Goal: *LTG: Returns to baseline functioning  Outcome: Progressing Towards Goal  Goal: Interventions  Outcome: Progressing Towards Goal     Problem: Patient Education: Go to Patient Education Activity  Goal: Patient/Family Education  Outcome: Progressing Towards Goal

## 2022-01-23 NOTE — PROGRESS NOTES
Hospitalist Progress Note   Admit Date:  2022  5:12 PM   Name:  Selvin Juárezchfavio   Age:  82 y.o. Sex:  male  :  1935   MRN:  [id number]   Room:  [facility]/    Presenting Complaint: Altered mental status    Reason(s) for Admission: Metabolic encephalopathy [Z32.22]  Bacterial pneumonia [J15.9]  Parkinson disease ([facility] [address] [G20]  Acute encephalopathy [G93.40]     Hospital Course & Interval History:   Patient is an 81 y/o male with PMH prostate cancer, CAD s/p CABG, HTN, HLD, Parkinson's disease with associated psychosis who presented to ED  for cc AMS. Neurology evaluated patient for medications recommendations. Patient completed antibiotic course for possible CAP. Palliative care did evaluate patient; hospice will be considered if he does not improve at rehab per spouse. Patient cleared for discharge to skilled nursing facility  but could not transfer due to restraint use 21. Facility will accept patient tomorrow . Patient currently remains out of restraints. Discharge ordered today  however patient has remained in mittens for which facility considers this a restraint. Patient must remain out of restraints (to include extremity restraints, mittens, lap belt) for 48 hours in order to discharge to facility. Discharge has been canceled. Subjective (22): Patient alert to self, calm, cooperative. Denies chest pain and SOB. Wife at bedside, involved in plan of care.     Assessment & Plan:     Principal Problem:  Metabolic encephalopathy  Parkinson's Disease, Advanced  -Mentation waxes and wanes  -Follows neurology outpatient, neurology evaluated inpatient; they discontinued Azilact as it is related to AMS/Hallucinations  -Continue Sinemet, Nuplazid, Zonisamide, Seroquel  -Avoid Geodon/Haldol  -Patient currently alert to self only; calm and cooperative  -Non-pharmacologic management of delirium:  · Reorient the patient throughout the day  · Window open and lights on during the day. Lights off, television off, noises down during the night.  If able, decrease nursing checks at night  · Therapies as often as possible  · Avoid restraints to the best of your ability   · Avoid sensory deprivation by using glasses and hearing aids, if applicable       Active Problems:  Malignant neoplasm of prostate (HCC)   -Noted    HTN (hypertension)  -Increased with agitation  -Continue Lisinopril, Lopressor, prn Clonidine  -BP control acceptable, currently 141/72    CAD s/p CABG  -Noted, continue BB, ACE    Atrial Fibrillation, new onset, resolved  -Converted spontaneously to NSR, TSH and lyes wnl, Echo unremarkable for this matter, shows diastolic dysfunction  -Initiated on Eliquis  -BB dose reduced due to bradycardia; currently rate-controlled    Bacterial pneumonia, resolved  -Possible CAP based on CXR imaging 1/13, s/p Zosyn/Cefuroxime and Doxycycline courses  -Covid-19 negative  -Hemodynamically stable on RA, currently without leukocytosis, afebrile    Dispo/Discharge Planning:  Anticipate d/c to SNF in 48 hours if remains out of restraints    Diet:  ADULT DIET Easy to Chew; Low Fat/Low Chol/High Fiber/2 gm Na  DVT PPx: Eliquis  Code status: DNR    Hospital Problems as of 1/23/2022 Date Reviewed: 1/4/2022          Codes Class Noted - Resolved POA    Acute encephalopathy ICD-10-CM: G93.40  ICD-9-CM: 348.30  1/15/2022 - Present Unknown        * (Principal) Metabolic encephalopathy [id number]: G93.41  ICD-9-CM: 348.31  1/13/2022 - Present Unknown        Bacterial pneumonia ICD-10-CM: J15.9  ICD-9-CM: 482.9  1/13/2022 - Present Unknown        Parkinson disease ([facility] ICD-10-CM: Terri Guess  ICD-9-CM: 332.0  8/24/2016 - Present Unknown        Tremor ICD-10-CM: R25.1  ICD-9-CM: 781.0  8/24/2016 - Present Yes        Hyperlipidemia ICD-10-CM: E78.5  ICD-9-CM: 272.4  7/26/2016 - Present Yes        Parkinson's disease ([facility] ICD-10-CM: Terri Guess  ICD-9-CM: 332.0  7/26/2016 - Present Yes    Overview Signed 7/26/2016  1:54 PM by Hitesh COLLADO     Overview:   (Dr. Carol De Luna)    Last Assessment & Plan:   States that he received a letter that his neurologist, Dr. Jesse Greenfield has moved. Will arrange f/u appt with a different neurologist.               HTN (hypertension) ICD-10-CM: I10  ICD-9-CM: 401.9  11/27/2013 - Present Yes        Malignant neoplasm of prostate ([facility] [address] ICD-10-CM: C61  ICD-9-CM: 185  9/13/2010 - Present Yes    Overview Signed 7/26/2016  1:54 PM by Hitesh COLLADO     Overview:   (Dr. Margean Meckel)                   Objective:     Patient Vitals for the past 24 hrs:   Temp Pulse Resp BP SpO2   01/23/22 0741 97.6 °F (36.4 °C) 63 18 (!) 141/72 98 %   01/23/22 0500 97.5 °F (36.4 °C) (!) 54 18 138/73 100 %   01/22/22 2359 97.8 °F (36.6 °C) (!) 58 16 (!) 105/57 98 %   01/22/22 2052 97.5 °F (36.4 °C) 66 16 (!) 180/80 100 %   01/22/22 1753 -- -- -- 112/66 --   01/22/22 1454 97.6 °F (36.4 °C) (!) 58 15 109/62 100 %     Oxygen Therapy  O2 Sat (%): 98 % (01/23/22 0741)  Pulse via Oximetry: 64 beats per minute (01/14/22 1039)  O2 Device: None (Room air) (01/22/22 1920)    Estimated body mass index is 22.93 kg/m² as calculated from the following:    Height as of this encounter: 5' 8\" (1.727 m). Weight as of this encounter: 68.4 kg (150 lb 12.7 oz). No intake or output data in the 24 hours ending 01/23/22 1144      Physical Exam:   Blood pressure (!) 141/72, pulse 63, temperature 97.6 °F (36.4 °C), resp. rate 18, height 5' 8\" (1.727 m), weight 68.4 kg (150 lb 12.7 oz), SpO2 98 %. General:    Frail. Alert. No acute distress. Oriented to self only, calm  Head:  Normocephalic, atraumatic  Eyes:  Sclerae appear normal.  Pupils equally round. Glasses intact  ENT:  Nares appear normal, no drainage. Moist oral mucosa  Neck:  No restricted ROM. Trachea midline   CV:   RRR. No m/r/g. No jugular venous distension. Lungs:   CTAB. No wheezing, rhonchi, or rales. Respirations even, unlabored on RA  Abdomen: Bowel sounds present.   Soft, nontender, nondistended. Extremities: No cyanosis or clubbing. No edema  Skin:     No rashes and normal coloration. Warm and dry. Neuro:  CN II-XII grossly intact. Sensation intact. A&O to self only. Calm, cooperative, follows commands and moves all 4 extremities. Psych:  Normal mood and affect. I have reviewed ordered lab tests and independently visualized imaging below:    Recent Labs:  Recent Results (from the past 48 hour(s))   CBC WITH AUTOMATED DIFF    Collection Time: 01/22/22  5:33 AM   Result Value Ref Range    WBC 7.5 4.3 - 11.1 K/uL    RBC 4.72 4.23 - 5.6 M/uL    HGB 14.6 13.6 - 17.2 g/dL    HCT 43.9 41.1 - 50.3 %    MCV 93.0 79.6 - 97.8 FL    MCH 30.9 26.1 - 32.9 PG    MCHC 33.3 31.4 - 35.0 g/dL    RDW 11.5 (L) 11.9 - 14.6 %    PLATELET 886 629 - 547 K/uL    MPV 11.7 9.4 - 12.3 FL    ABSOLUTE NRBC 0.00 0.0 - 0.2 K/uL    DF AUTOMATED      NEUTROPHILS 52 43 - 78 %    LYMPHOCYTES 29 13 - 44 %    MONOCYTES 10 4.0 - 12.0 %    EOSINOPHILS 9 (H) 0.5 - 7.8 %    BASOPHILS 1 0.0 - 2.0 %    IMMATURE GRANULOCYTES 0 0.0 - 5.0 %    ABS. NEUTROPHILS 3.9 1.7 - 8.2 K/UL    ABS. LYMPHOCYTES 2.2 0.5 - 4.6 K/UL    ABS. MONOCYTES 0.7 0.1 - 1.3 K/UL    ABS. EOSINOPHILS 0.7 0.0 - 0.8 K/UL    ABS. BASOPHILS 0.1 0.0 - 0.2 K/UL    ABS. IMM.  GRANS. 0.0 0.0 - 0.5 K/UL   METABOLIC PANEL, BASIC    Collection Time: 01/22/22  5:33 AM   Result Value Ref Range    Sodium 140 138 - 145 mmol/L    Potassium 3.4 (L) 3.5 - 5.1 mmol/L    Chloride 107 98 - 107 mmol/L    CO2 28 21 - 32 mmol/L    Anion gap 5 (L) 7 - 16 mmol/L    Glucose 88 65 - 100 mg/dL    BUN 20 8 - 23 MG/DL    Creatinine 0.78 (L) 0.8 - 1.5 MG/DL    GFR est AA >60 >60 ml/min/1.73m2    GFR est non-AA >60 >60 ml/min/1.73m2    Calcium 9.4 8.3 - 10.4 MG/DL   MAGNESIUM    Collection Time: 01/22/22  5:33 AM   Result Value Ref Range    Magnesium 2.2 1.8 - 2.4 mg/dL   SARS-COV-2    Collection Time: 01/22/22  6:35 AM   Result Value Ref Range    SARS-CoV-2 Nasopharyngeal SARS-COV-2, PCR    Collection Time: 01/22/22  6:35 AM    Specimen: Nasopharyngeal   Result Value Ref Range    Specimen source Nasopharyngeal      SARS-CoV-2 Not detected NOTD     COVID-19 RAPID TEST    Collection Time: 01/22/22  6:35 AM   Result Value Ref Range    Specimen source Nasopharyngeal      COVID-19 rapid test Not detected NOTD     METABOLIC PANEL, BASIC    Collection Time: 01/23/22  5:13 AM   Result Value Ref Range    Sodium 142 136 - 145 mmol/L    Potassium 3.9 3.5 - 5.1 mmol/L    Chloride 109 (H) 98 - 107 mmol/L    CO2 27 21 - 32 mmol/L    Anion gap 6 (L) 7 - 16 mmol/L    Glucose 85 65 - 100 mg/dL    BUN 26 (H) 8 - 23 MG/DL    Creatinine 1.36 0.8 - 1.5 MG/DL    GFR est AA >60 >60 ml/min/1.73m2    GFR est non-AA 53 (L) >60 ml/min/1.73m2    Calcium 9.2 8.3 - 10.4 MG/DL       All Micro Results     Procedure Component Value Units Date/Time    SARS-COV-2, PCR [[id number]] Collected: 01/22/22 9360    Order Status: Completed Specimen: Nasopharyngeal Updated: 01/22/22 1229     Specimen source Nasopharyngeal        SARS-CoV-2 Not detected        Comment:      The specimen is NEGATIVE for SARS-CoV-2, the novel coronavirus associated with COVID-19. This test has been authorized by the FDA under an Emergency Use Authorization (EUA) for use by authorized laboratories. Fact sheet for Healthcare Providers: [employer]nz       Fact sheet for Patients: [employer]nz       Methodology: RT-PCR         COVID-19 RAPID TEST [[id number]] Collected: 01/22/22 6804    Order Status: Completed Specimen: Nasopharyngeal Updated: 01/22/22 0702     Specimen source Nasopharyngeal        COVID-19 rapid test Not detected        Comment:      The specimen is NEGATIVE for SARS-CoV-2, the novel coronavirus associated with COVID-19. A negative result does not rule out COVID-19.        This test has been authorized by the FDA under an Emergency Use Authorization (EUA) for use by authorized laboratories. Fact sheet for Healthcare Providers: [employer]  Fact sheet for Patients: [employer]       Methodology: Isothermal Nucleic Acid Amplification         SARS-COV-2, PCR [[id number]] Collected: 01/19/22 1519    Order Status: Completed Specimen: Nasopharyngeal Updated: 01/20/22 0132     Specimen source Nasopharyngeal        SARS-CoV-2 Not detected        Comment:      The specimen is NEGATIVE for SARS-CoV-2, the novel coronavirus associated with COVID-19. This test has been authorized by the FDA under an Emergency Use Authorization (EUA) for use by authorized laboratories.         Fact sheet for Healthcare Providers: [employer]       Fact sheet for Patients: [employer]       Methodology: RT-PCR         BLOOD CULTURE [[id number]] Collected: 01/14/22 0532    Order Status: Completed Specimen: Blood Updated: 01/19/22 0742     Special Requests: --        LEFT  Antecubital       Culture result: NO GROWTH 5 DAYS       BLOOD CULTURE [[id number]] Collected: 01/13/22 1732    Order Status: Completed Specimen: Blood Updated: 01/18/22 0624     Special Requests: FOREARM        Culture result: NO GROWTH 5 DAYS       RESPIRATORY VIRUS PANEL W/COVID-19, PCR [[id number]] Collected: 01/13/22 2100    Order Status: Completed Specimen: Nasopharyngeal Updated: 01/13/22 2218     Adenovirus NOT DETECTED        Coronavirus 229E NOT DETECTED        Coronavirus HKU1 NOT DETECTED        Coronavirus CVNL63 NOT DETECTED        Coronavirus OC43 NOT DETECTED        SARS-CoV-2, PCR NOT DETECTED        Metapneumovirus NOT DETECTED        Rhinovirus and Enterovirus NOT DETECTED        Influenza A NOT DETECTED        Influenza B NOT DETECTED        Parainfluenza 1 NOT DETECTED        Parainfluenza 2 NOT DETECTED        Parainfluenza 3 NOT DETECTED        Parainfluenza virus 4 NOT DETECTED        RSV by PCR NOT DETECTED        B. parapertussis, PCR NOT DETECTED        Bordetella pertussis - PCR NOT DETECTED        Chlamydophila pneumoniae DNA, QL, PCR NOT DETECTED        Mycoplasma pneumoniae DNA, QL, PCR NOT DETECTED       COVID-19 RAPID TEST [[id number]] Collected: 01/13/22 1732    Order Status: Completed Specimen: Nasopharyngeal Updated: 01/13/22 1816     Specimen source NASAL        Comment: RAPID ONLY        COVID-19 rapid test Not detected        Comment:      The specimen is NEGATIVE for SARS-CoV-2, the novel coronavirus associated with COVID-19. A negative result does not rule out COVID-19. This test has been authorized by the FDA under an Emergency Use Authorization (EUA) for use by authorized laboratories. Fact sheet for Healthcare Providers: [employer]nz  Fact sheet for Patients: [employer]       Methodology: Isothermal Nucleic Acid Amplification               Other Studies:  No results found.     Current Meds:  Current Facility-Administered Medications   Medication Dose Route Frequency    lisinopriL (PRINIVIL, ZESTRIL) tablet 40 mg  40 mg Oral DAILY    metoprolol tartrate (LOPRESSOR) tablet 12.5 mg  12.5 mg Oral BID    cloNIDine HCL (CATAPRES) tablet 0.1 mg  0.1 mg Oral Q4H PRN    apixaban (ELIQUIS) tablet 5 mg  5 mg Oral BID    metoprolol (LOPRESSOR) injection 5 mg  5 mg IntraVENous Q6H PRN    carbidopa-levodopa (PARCOPA)  mg rapid dissolve tablet 1.5 Tablet  1.5 Tablet Oral PRN    zonisamide (ZONEGRAN) capsule 25 mg  25 mg Oral DAILY    influenza vaccine 2021-22 (6 mos+)(PF) (FLUARIX/FLULAVAL/FLUZONE QUAD) injection 0.5 mL  1 Each IntraMUSCular PRIOR TO DISCHARGE    QUEtiapine (SEROquel) tablet 50 mg  50 mg Oral BID    sodium chloride (NS) flush 5-40 mL  5-40 mL IntraVENous Q8H    sodium chloride (NS) flush 5-40 mL  5-40 mL IntraVENous PRN    acetaminophen (TYLENOL) tablet 650 mg  650 mg Oral Q6H PRN    Or    acetaminophen (TYLENOL) suppository 650 mg  650 mg Rectal Q6H PRN    polyethylene glycol (MIRALAX) packet 17 g  17 g Oral DAILY PRN    ondansetron (ZOFRAN ODT) tablet 4 mg  4 mg Oral Q8H PRN    Or    ondansetron (ZOFRAN) injection 4 mg  4 mg IntraVENous Q6H PRN    carbidopa-levodopa ER (SINEMET CR)  mg per tablet 2 Tablet  2 Tablet Oral QHS    pimavanserin cap 34 mg (Nuplazid) pt supplied (Patient Supplied)  34 mg Oral DAILY    carbidopa-levodopa (SINEMET)  mg per tablet 1.5 Tablet  1.5 Tablet Oral QID     Labs, vital signs, diagnostic testing reviewed. Case discussed with patient, care team, Dr. Amy Davis. Signed:  Gema Kline NP    Part of this note may have been written by using a voice dictation software. The note has been proof read but may still contain some grammatical/other typographical errors.

## 2022-01-23 NOTE — PROGRESS NOTES
Problem: Infection - Risk of, Surgical Site Infection  Goal: *Absence of surgical site infection signs and symptoms  Outcome: Progressing Towards Goal     Problem: Patient Education: Go to Patient Education Activity  Goal: Patient/Family Education  Outcome: Progressing Towards Goal

## 2022-01-23 NOTE — PROGRESS NOTES
Care Management Interventions  PCP Verified by CM: Yes  Mode of Transport at Discharge: BLS  Transition of Care Consult (CM Consult): Discharge Planning,Assisted Living,SNF  Discharge Durable Medical Equipment: No  Physical Therapy Consult: No  Occupational Therapy Consult: No  Speech Therapy Consult: No  Support Systems: Spouse/Significant Other,Child(russell)  Confirm Follow Up Transport: Family  The Plan for Transition of Care is Related to the Following Treatment Goals : SNF vs [facility]  Discharge Location  Patient Expects to be Discharged to[de-identified] Rehabilitation facility  Read-Only, Retired: Discharge Placement: [address] cancelled. Patient is still requiring restraints. Per facility patient will need to be out of restraints for 48h to return.  at [facility] notified. CM to continue to follow.

## 2022-01-23 NOTE — PROGRESS NOTES
Reviewed notes for any spiritual concerns        BRIEF VISIT WITH PT    CALMING PRESENCE        Will follow as needed

## 2022-01-23 NOTE — PROGRESS NOTES
Problem: Infection - Risk of, Surgical Site Infection  Goal: *Absence of surgical site infection signs and symptoms  1/23/2022 1543 by Sheron Sher RN  Outcome: Resolved/Met  1/23/2022 1542 by Sheron Sher RN  Outcome: Progressing Towards Goal     Problem: Patient Education: Go to Patient Education Activity  Goal: Patient/Family Education  1/23/2022 1543 by Sheron Sher RN  Outcome: Resolved/Met  1/23/2022 1542 by Sheron Sher RN  Outcome: Progressing Towards Goal

## 2022-01-24 PROCEDURE — 74011250636 HC RX REV CODE- 250/636: Performed by: STUDENT IN AN ORGANIZED HEALTH CARE EDUCATION/TRAINING PROGRAM

## 2022-01-24 PROCEDURE — 74011250637 HC RX REV CODE- 250/637: Performed by: FAMILY MEDICINE

## 2022-01-24 PROCEDURE — 74011250637 HC RX REV CODE- 250/637: Performed by: NURSE PRACTITIONER

## 2022-01-24 PROCEDURE — 74011250637 HC RX REV CODE- 250/637: Performed by: HOSPITALIST

## 2022-01-24 PROCEDURE — 65270000029 HC RM PRIVATE

## 2022-01-24 PROCEDURE — 97530 THERAPEUTIC ACTIVITIES: CPT

## 2022-01-24 PROCEDURE — 74011250637 HC RX REV CODE- 250/637: Performed by: INTERNAL MEDICINE

## 2022-01-24 PROCEDURE — 74011000250 HC RX REV CODE- 250: Performed by: FAMILY MEDICINE

## 2022-01-24 PROCEDURE — 2709999900 HC NON-CHARGEABLE SUPPLY

## 2022-01-24 PROCEDURE — 97535 SELF CARE MNGMENT TRAINING: CPT

## 2022-01-24 RX ORDER — SODIUM CHLORIDE 9 MG/ML
100 INJECTION, SOLUTION INTRAVENOUS CONTINUOUS
Status: DISPENSED | OUTPATIENT
Start: 2022-01-24 | End: 2022-01-24

## 2022-01-24 RX ADMIN — CARBIDOPA AND LEVODOPA 2 TABLET: 25; 100 TABLET, EXTENDED RELEASE ORAL at 21:35

## 2022-01-24 RX ADMIN — SODIUM CHLORIDE 100 ML/HR: 900 INJECTION, SOLUTION INTRAVENOUS at 14:05

## 2022-01-24 RX ADMIN — QUETIAPINE FUMARATE 50 MG: 100 TABLET ORAL at 11:33

## 2022-01-24 RX ADMIN — CARBIDOPA AND LEVODOPA 1.5 TABLET: 25; 100 TABLET ORAL at 21:35

## 2022-01-24 RX ADMIN — SODIUM CHLORIDE, PRESERVATIVE FREE 10 ML: 5 INJECTION INTRAVENOUS at 21:36

## 2022-01-24 RX ADMIN — QUETIAPINE FUMARATE 50 MG: 100 TABLET ORAL at 21:36

## 2022-01-24 RX ADMIN — CARBIDOPA AND LEVODOPA 1.5 TABLET: 25; 100 TABLET ORAL at 08:36

## 2022-01-24 RX ADMIN — SODIUM CHLORIDE, PRESERVATIVE FREE 10 ML: 5 INJECTION INTRAVENOUS at 05:54

## 2022-01-24 RX ADMIN — METOPROLOL TARTRATE 12.5 MG: 25 TABLET, FILM COATED ORAL at 08:37

## 2022-01-24 RX ADMIN — ZONISAMIDE 25 MG: 25 CAPSULE ORAL at 08:36

## 2022-01-24 RX ADMIN — APIXABAN 5 MG: 5 TABLET, FILM COATED ORAL at 08:36

## 2022-01-24 RX ADMIN — SODIUM CHLORIDE, PRESERVATIVE FREE 10 ML: 5 INJECTION INTRAVENOUS at 13:22

## 2022-01-24 RX ADMIN — METOPROLOL TARTRATE 12.5 MG: 25 TABLET, FILM COATED ORAL at 18:45

## 2022-01-24 RX ADMIN — CARBIDOPA AND LEVODOPA 1.5 TABLET: 25; 100 TABLET ORAL at 11:34

## 2022-01-24 RX ADMIN — CARBIDOPA AND LEVODOPA 1.5 TABLET: 25; 100 TABLET ORAL at 16:19

## 2022-01-24 RX ADMIN — APIXABAN 5 MG: 5 TABLET, FILM COATED ORAL at 21:36

## 2022-01-24 RX ADMIN — LISINOPRIL 40 MG: 20 TABLET ORAL at 08:36

## 2022-01-24 NOTE — PROGRESS NOTES
01/24/22 1644   Vital Signs   Pulse (Heart Rate) 77   BP (!) 143/70   MAP (Calculated) 94   BP Patient Position Supine   More BP/Pulse rows needed? Yes   Additional Blood Pressure/Pulse Data   Pulse 2 56   BP 2 (!) 72/58   MAP 2 (Calculated) (!) 63   BP 2 Location Left arm   BP Method 2 Automatic   Patient Position 2 Sitting     Ortho blood pressures ordered every shift. When sitting patient on side of bed, pt became diaphoretic and less responsive. BP 72/58 with pulse of 56. Did not feel comfortable attempting to stand pt to side of bed. Hospitalist notified.

## 2022-01-24 NOTE — PROGRESS NOTES
Hospitalist Progress Note   Admit Date:  2022  5:12 PM   Name:  John Lan   Age:  82 y.o. Sex:  male  :  1935   MRN:  [id number]   Room:  [facility]    Presenting Complaint: Altered mental status    Reason(s) for Admission: Metabolic encephalopathy [O57.34]  Bacterial pneumonia [J15.9]  Parkinson disease ([address] [G20]  Acute encephalopathy [G93.40]     Hospital Course & Interval History:   Patient is an 81 y/o male with PMH prostate cancer, CAD s/p CABG, HTN, HLD, Parkinson's disease with associated psychosis who presented to ED  for cc AMS. Neurology evaluated patient for medications recommendations. Patient completed antibiotic course for possible CAP. Palliative care did evaluate patient; hospice will be considered if he does not improve at rehab per spouse. Patient cleared for discharge to skilled nursing facility  but could not transfer due to restraint use 21. Discharge ordered  however patient has remained in mittens for which facility considers this a restraint. Patient must remain out of restraints (to include extremity restraints, mittens, lap belt) for 48 hours in order to discharge to facility. Discharge canceled. Order placed to not use any restraints without MD notification and authorization. Subjective (22): Patient alert to self, calm, cooperative. Denies chest pain and SOB. Wife at bedside, involved in plan of care.      Assessment & Plan:     Principal Problem:  Metabolic encephalopathy  Parkinson's Disease, Advanced  -Mentation waxes and wanes  -Follows neurology outpatient, neurology evaluated inpatient; they discontinued Azilact as it is related to AMS/Hallucinations  -Continue per Neurology: Sinemet, Nuplazid, Zonisamide, Seroquel; do not stop meds  -Avoid Geodon/Haldol  -Patient currently alert to self only; calm and cooperative  -Non-pharmacologic management of delirium:  · Reorient the patient throughout the day  · Window open and lights on during the day. Lights off, television off, noises down during the night.  If able, decrease nursing checks at night  · Therapies as often as possible  · Avoid restraints  · Avoid sensory deprivation by using glasses and hearing aids, if applicable       Active Problems:  Malignant neoplasm of prostate (HCC)   -Noted    HTN (hypertension)  -Increased with agitation  -Continue Lisinopril, Lopressor, prn Clonidine    Hypotension  -BP fairly labile 178/87 --> 94/54  -I have asked nursing to evaluate orthostatic vital signs  -Give 500 cc NS fluid bolus    CAD s/p CABG  -Noted, continue BB, ACE    Atrial Fibrillation, new onset, resolved  -Converted spontaneously to NSR, TSH and lyes wnl, Echo unremarkable for this matter, shows diastolic dysfunction  -Initiated on Eliquis  -BB dose reduced due to bradycardia; currently rate-controlled    Bacterial pneumonia, resolved  -Possible CAP  on CXR imaging 1/13, s/p Zosyn/Cefuroxime and Doxycycline courses  -Hemodynamically stable on RA, currently without leukocytosis, afebrile    Dispo/Discharge Planning:  Anticipate d/c to SNF Wednesday if remains out of restraints    Diet:  ADULT DIET Easy to Chew; Low Fat/Low Chol/High Fiber/2 gm Na  DVT PPx: Eliquis  Code status: DNR    Hospital Problems as of 1/24/2022 Date Reviewed: 1/4/2022          Codes Class Noted - Resolved POA    Acute encephalopathy ICD-10-CM: G93.40  ICD-9-CM: 348.30  1/15/2022 - Present Unknown        * (Principal) Metabolic encephalopathy EBS-65-FW: G93.41  ICD-9-CM: 348.31  1/13/2022 - Present Unknown        Bacterial pneumonia ICD-10-CM: J15.9  ICD-9-CM: 482.9  1/13/2022 - Present Unknown        Parkinson disease ([facility] ICD-10-CM: Leonor Mendoza  ICD-9-CM: 332.0  8/24/2016 - Present Unknown        Tremor ICD-10-CM: R25.1  ICD-9-CM: 781.0  8/24/2016 - Present Yes        Hyperlipidemia ICD-10-CM: E78.5  ICD-9-CM: 272.4  7/26/2016 - Present Yes        Parkinson's disease ([facility] ICD-10-CM: G20  ICD-9-CM: 332.0  7/26/2016 - Present Yes    Overview Signed 7/26/2016  1:54 PM by Elias COLLADO     Overview:   (Dr. Judit Figueroa)    Last Assessment & Plan:   States that he received a letter that his neurologist, Dr. Grecia Campbell has moved. Will arrange f/u appt with a different neurologist.               HTN (hypertension) ICD-10-CM: I10  ICD-9-CM: 401.9  11/27/2013 - Present Yes        Malignant neoplasm of prostate ([facility] [address] ICD-10-CM: C61  ICD-9-CM: 279  9/13/2010 - Present Yes    Overview Signed 7/26/2016  1:54 PM by Elias COLLADO     Overview:   (Dr. Angella Schaffer)                   Objective:     Patient Vitals for the past 24 hrs:   Temp Pulse Resp BP SpO2   01/24/22 1128 97.7 °F (36.5 °C) 86 18 (!) 94/54 93 %   01/24/22 0729 97.5 °F (36.4 °C) 72 16 (!) 178/87 97 %   01/24/22 0129 96.8 °F (36 °C) 60 18 (!) 114/59 100 %   01/23/22 2037 97.5 °F (36.4 °C) 65 16 (!) 173/83 100 %   01/23/22 1630 98.4 °F (36.9 °C) 63 18 132/67 96 %     Oxygen Therapy  O2 Sat (%): 93 % (01/24/22 1128)  Pulse via Oximetry: 64 beats per minute (01/14/22 1039)  O2 Device: None (Room air) (01/23/22 1930)    Estimated body mass index is 21.86 kg/m² as calculated from the following:    Height as of this encounter: 5' 8\" (1.727 m). Weight as of this encounter: 65.2 kg (143 lb 12.8 oz). No intake or output data in the 24 hours ending 01/24/22 1331      Physical Exam:   Blood pressure (!) 94/54, pulse 86, temperature 97.7 °F (36.5 °C), resp. rate 18, height 5' 8\" (1.727 m), weight 65.2 kg (143 lb 12.8 oz), SpO2 93 %. General:    Frail. Alert. No acute distress. Oriented to self only, calm  Head:  Normocephalic, atraumatic  Eyes:  Sclerae appear normal.  Pupils equally round. Glasses intact  ENT:  Nares appear normal, no drainage. Moist oral mucosa  Neck:  No restricted ROM. Trachea midline   CV:   RRR. No m/r/g. No jugular venous distension. Lungs:   CTAB. No wheezing, rhonchi, or rales. Respirations even, unlabored on RA  Abdomen: Bowel sounds present. Soft, nontender, nondistended. Extremities: No cyanosis or clubbing. No edema  Skin:     No rashes and normal coloration. Warm and dry. Neuro:  CN II-XII grossly intact. Sensation intact. A&O to self only. Calm, cooperative, follows commands and moves all 4 extremities. Psych:  Normal mood and affect. I have reviewed ordered lab tests and independently visualized imaging below:    Recent Labs:  Recent Results (from the past 48 hour(s))   METABOLIC PANEL, BASIC    Collection Time: 01/23/22  5:13 AM   Result Value Ref Range    Sodium 142 136 - 145 mmol/L    Potassium 3.9 3.5 - 5.1 mmol/L    Chloride 109 (H) 98 - 107 mmol/L    CO2 27 21 - 32 mmol/L    Anion gap 6 (L) 7 - 16 mmol/L    Glucose 85 65 - 100 mg/dL    BUN 26 (H) 8 - 23 MG/DL    Creatinine 1.36 0.8 - 1.5 MG/DL    GFR est AA >60 >60 ml/min/1.73m2    GFR est non-AA 53 (L) >60 ml/min/1.73m2    Calcium 9.2 8.3 - 10.4 MG/DL       All Micro Results     Procedure Component Value Units Date/Time    SARS-COV-2, PCR [[id number]] Collected: 01/22/22 3653    Order Status: Completed Specimen: Nasopharyngeal Updated: 01/22/22 1229     Specimen source Nasopharyngeal        SARS-CoV-2 Not detected        Comment:      The specimen is NEGATIVE for SARS-CoV-2, the novel coronavirus associated with COVID-19. This test has been authorized by the FDA under an Emergency Use Authorization (EUA) for use by authorized laboratories. Fact sheet for Healthcare Providers: ConventionUpdate.co.nz       Fact sheet for Patients: ConventionUpdate.co.nz       Methodology: RT-PCR         COVID-19 RAPID TEST [[id number]] Collected: 01/22/22 0882    Order Status: Completed Specimen: Nasopharyngeal Updated: 01/22/22 0702     Specimen source Nasopharyngeal        COVID-19 rapid test Not detected        Comment:      The specimen is NEGATIVE for SARS-CoV-2, the novel coronavirus associated with COVID-19.   A negative result does not rule out COVID-19. This test has been authorized by the FDA under an Emergency Use Authorization (EUA) for use by authorized laboratories. Fact sheet for Healthcare Providers: Outright.nz  Fact sheet for Patients: Outright.nz       Methodology: Isothermal Nucleic Acid Amplification         SARS-COV-2, PCR [[id number]] Collected: 01/19/22 1519    Order Status: Completed Specimen: Nasopharyngeal Updated: 01/20/22 0132     Specimen source Nasopharyngeal        SARS-CoV-2 Not detected        Comment:      The specimen is NEGATIVE for SARS-CoV-2, the novel coronavirus associated with COVID-19. This test has been authorized by the FDA under an Emergency Use Authorization (EUA) for use by authorized laboratories.         Fact sheet for Healthcare Providers: Outright.nz       Fact sheet for Patients: Planandoo.co.nz       Methodology: RT-PCR         BLOOD CULTURE [[id number]] Collected: 01/14/22 0532    Order Status: Completed Specimen: Blood Updated: 01/19/22 0742     Special Requests: --        LEFT  Antecubital       Culture result: NO GROWTH 5 DAYS       BLOOD CULTURE [[id number]] Collected: 01/13/22 1732    Order Status: Completed Specimen: Blood Updated: 01/18/22 0624     Special Requests: FOREARM        Culture result: NO GROWTH 5 DAYS       RESPIRATORY VIRUS PANEL W/COVID-19, PCR [[id number]] Collected: 01/13/22 2100    Order Status: Completed Specimen: Nasopharyngeal Updated: 01/13/22 2218     Adenovirus NOT DETECTED        Coronavirus 229E NOT DETECTED        Coronavirus HKU1 NOT DETECTED        Coronavirus CVNL63 NOT DETECTED        Coronavirus OC43 NOT DETECTED        SARS-CoV-2, PCR NOT DETECTED        Metapneumovirus NOT DETECTED        Rhinovirus and Enterovirus NOT DETECTED        Influenza A NOT DETECTED        Influenza B NOT DETECTED        Parainfluenza 1 NOT DETECTED Parainfluenza 2 NOT DETECTED        Parainfluenza 3 NOT DETECTED        Parainfluenza virus 4 NOT DETECTED        RSV by PCR NOT DETECTED        B. parapertussis, PCR NOT DETECTED        Bordetella pertussis - PCR NOT DETECTED        Chlamydophila pneumoniae DNA, QL, PCR NOT DETECTED        Mycoplasma pneumoniae DNA, QL, PCR NOT DETECTED       COVID-19 RAPID TEST [[id number]] Collected: 01/13/22 4317    Order Status: Completed Specimen: Nasopharyngeal Updated: 01/13/22 1816     Specimen source NASAL        Comment: RAPID ONLY        COVID-19 rapid test Not detected        Comment:      The specimen is NEGATIVE for SARS-CoV-2, the novel coronavirus associated with COVID-19. A negative result does not rule out COVID-19. This test has been authorized by the FDA under an Emergency Use Authorization (EUA) for use by authorized laboratories. Fact sheet for Healthcare Providers: ConventionSECUDE Internationaldate.co.nz  Fact sheet for Patients: 56.comdate.co.nz       Methodology: Isothermal Nucleic Acid Amplification               Other Studies:  No results found.     Current Meds:  Current Facility-Administered Medications   Medication Dose Route Frequency    0.9% sodium chloride infusion  100 mL/hr IntraVENous CONTINUOUS    lisinopriL (PRINIVIL, ZESTRIL) tablet 40 mg  40 mg Oral DAILY    metoprolol tartrate (LOPRESSOR) tablet 12.5 mg  12.5 mg Oral BID    cloNIDine HCL (CATAPRES) tablet 0.1 mg  0.1 mg Oral Q4H PRN    apixaban (ELIQUIS) tablet 5 mg  5 mg Oral BID    metoprolol (LOPRESSOR) injection 5 mg  5 mg IntraVENous Q6H PRN    carbidopa-levodopa (PARCOPA)  mg rapid dissolve tablet 1.5 Tablet  1.5 Tablet Oral PRN    zonisamide (ZONEGRAN) capsule 25 mg  25 mg Oral DAILY    influenza vaccine 2021-22 (6 mos+)(PF) (FLUARIX/FLULAVAL/FLUZONE QUAD) injection 0.5 mL  1 Each IntraMUSCular PRIOR TO DISCHARGE    QUEtiapine (SEROquel) tablet 50 mg  50 mg Oral BID    sodium chloride (NS) flush 5-40 mL  5-40 mL IntraVENous Q8H    sodium chloride (NS) flush 5-40 mL  5-40 mL IntraVENous PRN    acetaminophen (TYLENOL) tablet 650 mg  650 mg Oral Q6H PRN    Or    acetaminophen (TYLENOL) suppository 650 mg  650 mg Rectal Q6H PRN    polyethylene glycol (MIRALAX) packet 17 g  17 g Oral DAILY PRN    ondansetron (ZOFRAN ODT) tablet 4 mg  4 mg Oral Q8H PRN    Or    ondansetron (ZOFRAN) injection 4 mg  4 mg IntraVENous Q6H PRN    carbidopa-levodopa ER (SINEMET CR)  mg per tablet 2 Tablet  2 Tablet Oral QHS    pimavanserin cap 34 mg (Nuplazid) pt supplied (Patient Supplied)  34 mg Oral DAILY    carbidopa-levodopa (SINEMET)  mg per tablet 1.5 Tablet  1.5 Tablet Oral QID     Labs, vital signs, diagnostic testing reviewed. Case discussed with patient, care team, Dr. Nik Pino. Signed:  Yuko Fajardo NP    Part of this note may have been written by using a voice dictation software. The note has been proof read but may still contain some grammatical/other typographical errors.

## 2022-01-24 NOTE — PROGRESS NOTES
ACUTE OT GOALS:  (Developed with and agreed upon by patient and/or caregiver.)  1. Pt will toilet with min a   2. Pt will complete functional mobility for ADLs with min A using AD as needed  3. Pt will complete lower body dressing with min A using AE as needed  4. Pt will complete grooming and hygiene with set up  5. Pt will demonstrate independence with HEP to promote increased BUE strength and functional use for ADLs  6. Pt will complete UB bathing/ dressing with min A     OCCUPATIONAL THERAPY: Daily Note OT Treatment Day # 3    Fredy Nicole is a 82 y.o. male   PRIMARY DIAGNOSIS: Metabolic encephalopathy  Metabolic encephalopathy [S10.07]  Bacterial pneumonia [J15.9]  Parkinson disease ([facility] [address] [G20]  Acute encephalopathy [G93.40]       [place] / [place] / Product Type: Medicare /   ASSESSMENT:     REHAB RECOMMENDATIONS: CURRENT LEVEL OF FUNCTION:  (Most Recently Demonstrated)   Recommendation to date pending progress:  Setting:   Short-term Rehab  Equipment:    None Bathing:   Not tested  Dressing:   Not tested  Feeding/Grooming:   Total Assistance-feeding   Toileting:   Not tested  Functional Mobility:   Moderate Assistance- bed mobility      ASSESSMENT:  Mr. Jason Bright was supine in bed upon arrival. Pt completed bed mobility with mod A X 2. Pt sat EOB to eat breakfast. Pt required total A for self feeding. Pt stood and attempted to take some steps. Pt became unresponsive. Pt was returned to supine. Once in supine pt became alert. RN came in. Continue POC.       SUBJECTIVE:   Mr. Jason Bright states, \"Hey\"    SOCIAL HISTORY/LIVING ENVIRONMENT:   Home Environment: Patient room  One/Two Story Residence: One story  Living Alone: No  Support Systems: Spouse/Significant Other,Child(russell)    OBJECTIVE:     PAIN: VITAL SIGNS: LINES/DRAINS:   Pre Treatment:    Post Treatment: 0   IV  O2 Device: None (Room air)     ACTIVITIES OF DAILY LIVING: I Mod I S SBA CGA Min Mod Max Total NT Comments BASIC ADLs:              Bathing/ Showering [] [] [] [] [] [] [] [] [] [x]    Toileting [] [] [] [] [] [] [] [] [] [x]    Dressing [] [] [] [] [] [] [] [] [] [x]    Feeding [] [] [] [] [] [] [] [] [x] []    Grooming [] [] [] [] [] [] [] [] [] [x]    Personal Device Care [] [] [] [] [] [] [] [] [] [x]    Functional Mobility [] [] [] [] [] [] [] [] [] [x]    I=Independent, Mod I=Modified Independent, S=Supervision, SBA=Standby Assistance, CGA=Contact Guard Assistance,   Min=Minimal Assistance, Mod=Moderate Assistance, Max=Maximal Assistance, Total=Total Assistance, NT=Not Tested    MOBILITY: I Mod I S SBA CGA Min Mod Max Total  NT x2 Comments:   Supine to sit [] [] [] [] [] [] [x] [] [] [] [x]    Sit to supine [] [] [] [] [] [] [x] [] [] [] [x]    Sit to stand [] [] [] [] [] [] [x] [] [] [] [x]    Bed to chair [] [] [] [] [] [] [] [] [] [x] []    I=Independent, Mod I=Modified Independent, S=Supervision, SBA=Standby Assistance, CGA=Contact Guard Assistance,   Min=Minimal Assistance, Mod=Moderate Assistance, Max=Maximal Assistance, Total=Total Assistance, NT=Not Tested    BALANCE: Good Fair+ Fair Fair- Poor NT Comments   Sitting Static [] [x] [] [] [] []    Sitting Dynamic [] [] [] [] [] []              Standing Static [] [] [] [x] [] []    Standing Dynamic [] [] [] [] [] [x]      PLAN:   FREQUENCY/DURATION: OT Plan of Care: 3 times/week for duration of hospital stay or until stated goals are met, whichever comes first.    TREATMENT:   TREATMENT:   ($$ Self Care/Home Management: 38-52 mins    )  Self Care (40 Minutes): Self care including Self Feeding to increase independence and decrease level of assistance required.     TREATMENT GRID:  N/A    AFTER TREATMENT POSITION/PRECAUTIONS:  Bed, Needs within reach, RN notified, Visitors at bedside and lap belt     INTERDISCIPLINARY COLLABORATION:  RN/PCT, PT/PTA and OT/CAMPOVERDE    TOTAL TREATMENT DURATION:  OT Patient Time In/Time Out  Time In: 0900  Time Out: 0940    Rachna CABRERA Miya Reynoso

## 2022-01-24 NOTE — PROGRESS NOTES
ACUTE PHYSICAL THERAPY GOALS:  (Developed with and agreed upon by patient and/or caregiver.)  (1.) Shana Velazco  will move from supine to sit and sit to supine  with STAND BY ASSIST within 3 treatment day(s). (2.) Shana Velazco will transfer from bed to chair and chair to bed with CONTACT GUARD ASSIST using the least restrictive device within 3 treatment day(s). (3.) Shana Velazco will ambulate with CONTACT GUARD ASSIST for 10 feet with the least restrictive device within 3 treatment day(s). (4.) Shana Velazco will perform standing static and dynamic balance activities x 10+ minutes with CONTACT GUARD ASSIST to improve safety within 3 treatment day(s). (5.) Shana Velazco will improved seated balance to stand by assist to improve functional mobility and safety within 3 treatment day(s).         PHYSICAL THERAPY: Daily Note and AM Treatment Day # 4    Shana Velazco is a 82 y.o. male   PRIMARY DIAGNOSIS: Metabolic encephalopathy  Metabolic encephalopathy [W89.56]  Bacterial pneumonia [J15.9]  Parkinson disease ([facility] [address] [G20]  Acute encephalopathy [G93.40]         ASSESSMENT:     REHAB RECOMMENDATIONS: CURRENT LEVEL OF FUNCTION:  (Most Recently Demonstrated)   Recommendation to date pending progress:  Setting:   Short-term Rehab  Equipment:    To Be Determined Bed Mobility:   Moderate Assistance  Sit to Stand:   Moderate Assistance x 2  Transfers:   Unable to perform  Gait/Mobility:   Unable to perform     ASSESSMENT:  Mr. Avelina Melton did not do  Well today as he did the last treatment. He had B mittens on on contact. Sat to EOB and was exhibiting quick jerky movements off and on throughout treatment. Worked on sitting balance. Stood to RW to amb around bed and pt would not take a step he then sat impulsively to the EOB and lay back across the bed and would not speak though he made eye contact a few times. RN called to room. He seemed to [place] out of it\".    Pt positioned into bed with HOB up. SUBJECTIVE:   Mr. Yg Ramos did not talk.     SOCIAL HISTORY/ LIVING ENVIRONMENT: See initial evaluation  Home Environment: Patient room  One/Two Story Residence: One story  Living Alone: No  Support Systems: Spouse/Significant Other,Child(russell)  OBJECTIVE:     PAIN: VITAL SIGNS: LINES/DRAINS:   Pre Treatment:    Post Treatment: 0   None  O2 Device: None (Room air)     MOBILITY: I Mod I S SBA CGA Min Mod Max Total  NT x2 Comments:   Bed Mobility    Rolling [] [] [] [] [] [] [] [] [] [] []    Supine to Sit [] [] [] [x] [] [] [] [] [] [] []    Scooting [] [] [] [] [] [] [] [] [] [] []    Sit to Supine [] [] [] [x] [] [] [] [] [] [] []    Transfers    Sit to Stand [] [] [] [] [] [x] [] [] [] [] []    Bed to Chair [] [] [] [] [] [x] [] [] [] [] []    Stand to Sit [] [] [] [] [] [x] [] [] [] [] []    I=Independent, Mod I=Modified Independent, S=Supervision, SBA=Standby Assistance, CGA=Contact Guard Assistance,   Min=Minimal Assistance, Mod=Moderate Assistance, Max=Maximal Assistance, Total=Total Assistance, NT=Not Tested    BALANCE: Good Fair+ Fair Fair- Poor NT Comments   Sitting Static [] [] [x] [] [] []    Sitting Dynamic [] [] [x] [] [] []              Standing Static [] [] [] [] [x] []    Standing Dynamic [] [] [] [] [] []      GAIT: I Mod I S SBA CGA Min Mod Max Total  NT x2 Comments:   Level of Assistance [] [] [] [] [] [] [] [] [] [] []    Distance     DME N/A    Gait Quality     Weightbearing  Status N/A     I=Independent, Mod I=Modified Independent, S=Supervision, SBA=Standby Assistance, CGA=Contact Guard Assistance,   Min=Minimal Assistance, Mod=Moderate Assistance, Max=Maximal Assistance, Total=Total Assistance, NT=Not Tested    PLAN:   FREQUENCY/DURATION: PT Plan of Care: 3 times/week for duration of hospital stay or until stated goals are met, whichever comes first.  TREATMENT:     TREATMENT:   ($$ Therapeutic Activity: 38-52 mins    )  Co-Treatment PT/OT necessary due to patient's decreased overall endurance/tolerance levels, as well as need for high level skilled assistance to complete functional transfers/mobility and functional tasks  Therapeutic Activity (40 Minutes): Therapeutic activity included Rolling, Supine to Sit, Sit to Supine, Scooting, Transfer Training, Sitting balance  and Standing balance to improve functional Mobility, Strength and Activity tolerance.     TREATMENT GRID:  N/A    AFTER TREATMENT POSITION/PRECAUTIONS:  Bed, Needs within reach, RN notified and Visitors at bedside    INTERDISCIPLINARY COLLABORATION:  RN/PCT, PT/PTA and OT/CAMPOVERDE    TOTAL TREATMENT DURATION:  PT Patient Time In/Time Out  Time In: 0900  Time Out: Michael Wilson PTA

## 2022-01-25 LAB
ANION GAP SERPL CALC-SCNC: 5 MMOL/L (ref 7–16)
BUN SERPL-MCNC: 34 MG/DL (ref 8–23)
CALCIUM SERPL-MCNC: 8.5 MG/DL (ref 8.3–10.4)
CHLORIDE SERPL-SCNC: 114 MMOL/L (ref 98–107)
CO2 SERPL-SCNC: 24 MMOL/L (ref 21–32)
CREAT SERPL-MCNC: 1.06 MG/DL (ref 0.8–1.5)
GLUCOSE SERPL-MCNC: 78 MG/DL (ref 65–100)
POTASSIUM SERPL-SCNC: 4.2 MMOL/L (ref 3.5–5.1)
SARS-COV-2, COV2: NORMAL
SARS-COV-2, COV2: NOT DETECTED
SODIUM SERPL-SCNC: 143 MMOL/L (ref 136–145)
SPECIMEN SOURCE, FCOV2M: NORMAL

## 2022-01-25 PROCEDURE — 74011250637 HC RX REV CODE- 250/637: Performed by: FAMILY MEDICINE

## 2022-01-25 PROCEDURE — 65270000029 HC RM PRIVATE

## 2022-01-25 PROCEDURE — U0005 INFEC AGEN DETEC AMPLI PROBE: HCPCS

## 2022-01-25 PROCEDURE — 97112 NEUROMUSCULAR REEDUCATION: CPT

## 2022-01-25 PROCEDURE — 74011250637 HC RX REV CODE- 250/637: Performed by: HOSPITALIST

## 2022-01-25 PROCEDURE — 74011250636 HC RX REV CODE- 250/636: Performed by: STUDENT IN AN ORGANIZED HEALTH CARE EDUCATION/TRAINING PROGRAM

## 2022-01-25 PROCEDURE — 74011000250 HC RX REV CODE- 250: Performed by: FAMILY MEDICINE

## 2022-01-25 PROCEDURE — 36415 COLL VENOUS BLD VENIPUNCTURE: CPT

## 2022-01-25 PROCEDURE — 97530 THERAPEUTIC ACTIVITIES: CPT

## 2022-01-25 PROCEDURE — 80048 BASIC METABOLIC PNL TOTAL CA: CPT

## 2022-01-25 PROCEDURE — 74011250636 HC RX REV CODE- 250/636: Performed by: HOSPITALIST

## 2022-01-25 PROCEDURE — 74011250637 HC RX REV CODE- 250/637: Performed by: INTERNAL MEDICINE

## 2022-01-25 PROCEDURE — 74011250637 HC RX REV CODE- 250/637: Performed by: NURSE PRACTITIONER

## 2022-01-25 RX ORDER — SODIUM CHLORIDE 9 MG/ML
75 INJECTION, SOLUTION INTRAVENOUS CONTINUOUS
Status: DISCONTINUED | OUTPATIENT
Start: 2022-01-25 | End: 2022-01-25

## 2022-01-25 RX ORDER — SODIUM CHLORIDE 9 MG/ML
100 INJECTION, SOLUTION INTRAVENOUS CONTINUOUS
Status: DISPENSED | OUTPATIENT
Start: 2022-01-25 | End: 2022-01-25

## 2022-01-25 RX ADMIN — CARBIDOPA AND LEVODOPA 2 TABLET: 25; 100 TABLET, EXTENDED RELEASE ORAL at 21:30

## 2022-01-25 RX ADMIN — APIXABAN 5 MG: 5 TABLET, FILM COATED ORAL at 08:33

## 2022-01-25 RX ADMIN — CARBIDOPA AND LEVODOPA 1.5 TABLET: 25; 100 TABLET ORAL at 12:57

## 2022-01-25 RX ADMIN — SODIUM CHLORIDE 1000 ML: 900 INJECTION, SOLUTION INTRAVENOUS at 01:00

## 2022-01-25 RX ADMIN — QUETIAPINE FUMARATE 50 MG: 100 TABLET ORAL at 12:57

## 2022-01-25 RX ADMIN — SODIUM CHLORIDE, PRESERVATIVE FREE 10 ML: 5 INJECTION INTRAVENOUS at 05:31

## 2022-01-25 RX ADMIN — CARBIDOPA AND LEVODOPA 1.5 TABLET: 25; 100 TABLET ORAL at 08:33

## 2022-01-25 RX ADMIN — METOPROLOL TARTRATE 12.5 MG: 25 TABLET, FILM COATED ORAL at 17:59

## 2022-01-25 RX ADMIN — APIXABAN 5 MG: 5 TABLET, FILM COATED ORAL at 21:31

## 2022-01-25 RX ADMIN — CARBIDOPA AND LEVODOPA 1.5 TABLET: 25; 100 TABLET ORAL at 21:31

## 2022-01-25 RX ADMIN — CLONIDINE HYDROCHLORIDE 0.1 MG: 0.1 TABLET ORAL at 16:39

## 2022-01-25 RX ADMIN — SODIUM CHLORIDE, PRESERVATIVE FREE 10 ML: 5 INJECTION INTRAVENOUS at 13:21

## 2022-01-25 RX ADMIN — SODIUM CHLORIDE 100 ML/HR: 900 INJECTION, SOLUTION INTRAVENOUS at 16:40

## 2022-01-25 RX ADMIN — QUETIAPINE FUMARATE 50 MG: 100 TABLET ORAL at 21:31

## 2022-01-25 RX ADMIN — CARBIDOPA AND LEVODOPA 1.5 TABLET: 25; 100 TABLET ORAL at 16:39

## 2022-01-25 RX ADMIN — METOPROLOL TARTRATE 12.5 MG: 25 TABLET, FILM COATED ORAL at 08:33

## 2022-01-25 RX ADMIN — SODIUM CHLORIDE, PRESERVATIVE FREE 5 ML: 5 INJECTION INTRAVENOUS at 21:35

## 2022-01-25 RX ADMIN — ZONISAMIDE 25 MG: 25 CAPSULE ORAL at 08:43

## 2022-01-25 NOTE — PROGRESS NOTES
ACUTE OT GOALS:  (Developed with and agreed upon by patient and/or caregiver.)  1. Pt will toilet with min a   2. Pt will complete functional mobility for ADLs with min A using AD as needed  3. Pt will complete lower body dressing with min A using AE as needed  4. Pt will complete grooming and hygiene with set up  5. Pt will demonstrate independence with HEP to promote increased BUE strength and functional use for ADLs  6. Pt will complete UB bathing/ dressing with min A     OCCUPATIONAL THERAPY: Daily Note OT Treatment Day # 4    Shana Velazco is a 82 y.o. male   PRIMARY DIAGNOSIS: Metabolic encephalopathy  Metabolic encephalopathy [Q64.44]  Bacterial pneumonia [J15.9]  Parkinson disease ([facility] [address] [G20]  Acute encephalopathy [G93.40]       [place] / [place] / Product Type: Medicare /   ASSESSMENT:     REHAB RECOMMENDATIONS: CURRENT LEVEL OF FUNCTION:  (Most Recently Demonstrated)   Recommendation to date pending progress:  Setting:   Short-term Rehab  Equipment:    None Bathing:   Not tested  Dressing:   Not tested  Feeding/Grooming:   Not tested  Toileting:   Not tested  Functional Mobility:   Minimal Assistance- bed mobility      ASSESSMENT:  Mr. Avelina Melton was supine in bed upon arrival. Pt completed bed mobility with min A X 2. Pt completed functional mobility with min A X 2 using a rolling walker. Continue POC.       SUBJECTIVE:   Mr. Avelina Melton states, \"Hey\"    SOCIAL HISTORY/LIVING ENVIRONMENT:   Home Environment: Patient room  One/Two Story Residence: One story  Living Alone: No  Support Systems: Spouse/Significant Other,Child(russell)    OBJECTIVE:     PAIN: VITAL SIGNS: LINES/DRAINS:   Pre Treatment: Pain Screen  Pain Scale 1: Numeric (0 - 10)  Pain Intensity 1: 0  Post Treatment: 0   IV  O2 Device: None (Room air)     ACTIVITIES OF DAILY LIVING: I Mod I S SBA CGA Min Mod Max Total NT Comments   BASIC ADLs:              Bathing/ Showering [] [] [] [] [] [] [] [] [] [x] Toileting [] [] [] [] [] [] [] [] [] [x]    Dressing [] [] [] [] [] [] [] [] [] [x]    Feeding [] [] [] [] [] [] [] [] [] [x]    Grooming [] [] [] [] [] [] [] [] [] [x]    Personal Device Care [] [] [] [] [] [] [] [] [] [x]    Functional Mobility [] [] [] [] [] [] [] [] [] [x]    I=Independent, Mod I=Modified Independent, S=Supervision, SBA=Standby Assistance, CGA=Contact Guard Assistance,   Min=Minimal Assistance, Mod=Moderate Assistance, Max=Maximal Assistance, Total=Total Assistance, NT=Not Tested    MOBILITY: I Mod I S SBA CGA Min Mod Max Total  NT x2 Comments:   Supine to sit [] [] [] [] [] [x] [] [] [] [] [x]    Sit to supine [] [] [] [] [] [x] [] [] [] [] [x]    Sit to stand [] [] [] [] [] [x] [] [] [] [] [x]    Bed to chair [] [] [] [] [] [] [] [] [] [x] []    I=Independent, Mod I=Modified Independent, S=Supervision, SBA=Standby Assistance, CGA=Contact Guard Assistance,   Min=Minimal Assistance, Mod=Moderate Assistance, Max=Maximal Assistance, Total=Total Assistance, NT=Not Tested    BALANCE: Good Fair+ Fair Fair- Poor NT Comments   Sitting Static [] [x] [] [] [] []    Sitting Dynamic [] [] [] [] [] []              Standing Static [] [] [] [x] [] []    Standing Dynamic [] [] [] [] [] [x]      PLAN:   FREQUENCY/DURATION: OT Plan of Care: 3 times/week for duration of hospital stay or until stated goals are met, whichever comes first.    TREATMENT:   TREATMENT:   ( $$ Neuromuscular Re-Education: 23-37 mins   )  Neuromuscular Re-education (30 Minutes): Neuromuscular Re-education included Balance Training, Postural training and Standing balance training to improve Coordination, Functional Mobility and Postural Control.     TREATMENT GRID:  N/A    AFTER TREATMENT POSITION/PRECAUTIONS:  Bed, Needs within reach and RN notified    INTERDISCIPLINARY COLLABORATION:  RN/PCT, PT/PTA and OT/CAMPOVERDE    TOTAL TREATMENT DURATION:  OT Patient Time In/Time Out  Time In: 0930  Time Out: [address] [facility]

## 2022-01-25 NOTE — PROGRESS NOTES
ACUTE PHYSICAL THERAPY GOALS:  (Developed with and agreed upon by patient and/or caregiver.)  (1.) Gregorio Gar  will move from supine to sit and sit to supine  with STAND BY ASSIST within 3 treatment day(s). (2.) Gregorio Gar will transfer from bed to chair and chair to bed with CONTACT GUARD ASSIST using the least restrictive device within 3 treatment day(s). (3.) Gregorio Gar will ambulate with CONTACT GUARD ASSIST for 10 feet with the least restrictive device within 3 treatment day(s). (4.) Gregorio Gar will perform standing static and dynamic balance activities x 10+ minutes with CONTACT GUARD ASSIST to improve safety within 3 treatment day(s). (5.) Gregorio Gar will improved seated balance to stand by assist to improve functional mobility and safety within 3 treatment day(s).         PHYSICAL THERAPY: Daily Note and AM Treatment Day # 5    Gregorio Gar is a 82 y.o. male   PRIMARY DIAGNOSIS: Metabolic encephalopathy  Metabolic encephalopathy [S24.72]  Bacterial pneumonia [J15.9]  [facility] [G20]  Acute encephalopathy [G93.40]         ASSESSMENT:     REHAB RECOMMENDATIONS: CURRENT LEVEL OF FUNCTION:  (Most Recently Demonstrated)   Recommendation to date pending progress:  Setting:   Short-term Rehab  Equipment:    To Be Determined Bed Mobility:   Moderate Assistance  Sit to Stand:   Minimal Assistance x 2  Transfers:   Not tested  Gait/Mobility:   Minimal Assistance x 2 c RW and chair follow     ASSESSMENT:  Mr. Justin York shows improvement from previous sessions as his mentation was better and he was able to progress activity tolerance with less (A) from therapy staff. Today, pt was Mod ()A for bed mobility, Min (A)x2 for STS from EOB and Min (A)x2 c RW and chair follow for ambulation in hallway. Pt was able to inc ambulation distance to 60'x1 although he did take 1-2 standing rest breaks.  Pt ambulates c forward-flexed and festinating gait requiring consistent (A) from therapy staff to negotiate RW. Throughout session, pt also had 1-2 episodes of brief syncope that he immediately recovered from following sternal rubs. Pt SBP was in the 180's in supine, 120s in sitting and 109 once standing although readings are likely not 100% accurate as pt was actively moving his arm c BP cuff while assessing. Overall, pt shows good progress with regard to functional capacity although his orthostatics and confused state cont to be major limiting factors in progressing therapy. Will continue to follow.       SUBJECTIVE:   Mr. Pace Delay : \"I feel good\"    SOCIAL HISTORY/ LIVING ENVIRONMENT: See initial evaluation  Home Environment: Patient room  One/Two Story Residence: One story  Living Alone: No  Support Systems: Spouse/Significant Other,Child(russell)  OBJECTIVE:     PAIN: VITAL SIGNS: LINES/DRAINS:   Pre Treatment: Pain Screen  Pain Scale 1: Numeric (0 - 10)  Pain Intensity 1: 0  Patient Stated Pain Goal: 0  Post Treatment: 0 Vital Signs  O2 Device: None (Room air) None  O2 Device: None (Room air)     MOBILITY: I Mod I S SBA CGA Min Mod Max Total  NT x2 Comments:   Bed Mobility    Rolling [] [] [] [] [] [] [] [] [] [] []    Supine to Sit [] [] [] [] [] [x] [x] [] [] [] []    Scooting [] [] [] [] [] [x] [x] [] [] [] []    Sit to Supine [] [] [] [] [] [x] [x] [] [] [] []    Transfers    Sit to Stand [] [] [] [] [] [x] [] [] [] [] []    Bed to Chair [] [] [] [] [] [] [] [] [] [] []    Stand to Sit [] [] [] [] [] [x] [] [] [] [] []    I=Independent, Mod I=Modified Independent, S=Supervision, SBA=Standby Assistance, CGA=Contact Guard Assistance,   Min=Minimal Assistance, Mod=Moderate Assistance, Max=Maximal Assistance, Total=Total Assistance, NT=Not Tested    BALANCE: Good Fair+ Fair Fair- Poor NT Comments   Sitting Static [] [] [x] [] [] []    Sitting Dynamic [] [] [x] [] [] []              Standing Static [] [] [] [x] [] []    Standing Dynamic [] [] [] [x] [] []      GAIT: I Mod I S SBA CGA Min Mod Max Total  NT x2 Comments:   Level of Assistance [] [] [] [] [] [x] [] [] [] [] [x]    Distance 60'x1 (1-2 standing breaks)    xDME Rolling Walker and chair follow    Gait Quality Festinating, forward flexed, unsteady    Weightbearing  Status N/A     I=Independent, Mod I=Modified Independent, S=Supervision, SBA=Standby Assistance, CGA=Contact Guard Assistance,   Min=Minimal Assistance, Mod=Moderate Assistance, Max=Maximal Assistance, Total=Total Assistance, NT=Not Tested    PLAN:   FREQUENCY/DURATION: PT Plan of Care: 3 times/week for duration of hospital stay or until stated goals are met, whichever comes first.  TREATMENT:     TREATMENT:   ($$ Therapeutic Activity: 38-52 mins    )  Co-Treatment PT/OT necessary due to patient's decreased overall endurance/tolerance levels, as well as need for high level skilled assistance to complete functional transfers/mobility and functional tasks  Therapeutic Activity (42 Minutes): Therapeutic activity included Rolling, Supine to Sit, Sit to Supine, Scooting, Transfer Training, Ambulation on level ground, Sitting balance  and Standing balance to improve functional Mobility, Strength and Activity tolerance.     TREATMENT GRID:  N/A    AFTER TREATMENT POSITION/PRECAUTIONS:  Bed, Needs within reach, RN notified and Visitors at bedside    INTERDISCIPLINARY COLLABORATION:  RN/PCT, PT/PTA and OT/CAMPOVERDE    TOTAL TREATMENT DURATION:  PT Patient Time In/Time Out  Time In: [address]  Time Out: [address] PT

## 2022-01-25 NOTE — PROGRESS NOTES
SW notified [facility] admissions that pt will be restraint free for 24 hours as of 1430 today. They confirmed that if the pt remains restraint free until lunchtime tomorrow he can transfer to their rehab facility. Transport scheduled for 12pm tomorrow. NP notified so that dc will be completed by that time. REAGAN spoke with pt's spouse, via phone, and updated her on the plans for dc and transport tomorrow. SW following.

## 2022-01-25 NOTE — PROGRESS NOTES
Hospitalist Progress Note   Admit Date:  2022  5:12 PM   Name:  Artemio Avendaño   Age:  82 y.o. Sex:  male  :  1935   MRN:  [id number]   Room:  [facility]    Presenting Complaint: Altered mental status    Reason(s) for Admission: Metabolic encephalopathy [O73.59]  Bacterial pneumonia [J15.9]  Parkinson disease ([facility] [address] [G20]  Acute encephalopathy [G93.40]     Hospital Course & Interval History:   Patient is an 83 y/o male with PMH prostate cancer, CAD s/p CABG, HTN, HLD, Parkinson's disease with associated psychosis who presented to ED  for cc AMS. Neurology evaluated patient for medications recommendations. Patient completed antibiotic course for possible CAP. Palliative care did evaluate patient; hospice will be considered if he does not improve at rehab per spouse. Patient cleared for discharge to skilled nursing facility  but could not transfer due to restraint use 21. Discharge ordered  however patient has remained in mittens for which facility considers this a restraint. Patient must remain out of restraints (to include extremity restraints, mittens, lap belt) for 48 hours in order to discharge to facility. Discharge canceled. Order placed to not use any restraints without MD notification and authorization. Subjective (22): Patient alert to self, he is restless in bed today. He denies chest pain and SOB. \"I love the food here. \" No complaints.     Assessment & Plan:     Principal Problem:  Metabolic encephalopathy  Parkinson's Disease, Advanced  -Mentation waxes and wanes  -Follows neurology outpatient, neurology evaluated inpatient; they discontinued Azilact as it is related to AMS/Hallucinations  -Continue per Neurology: Sinemet, Nuplazid, Zonisamide, Seroquel; do not stop meds  -Avoid Geodon/Haldol  -Patient currently alert to self only  -Non-pharmacologic management of delirium:  · Reorient the patient throughout the day  · Window open and lights on during the day. Lights off, television off, noises down during the night.  If able, decrease nursing checks at night  · Therapies as often as possible  · Avoid restraints  · Avoid sensory deprivation by using glasses and hearing aids, if applicable       Active Problems:  Malignant neoplasm of prostate (HCC)   -Noted    HTN (hypertension)  -Increased with agitation  -Continue Lopressor, prn Clonidine  -Lisinopril held as noted for orthostatic hypotension    Orthostatic Hypotension  -BP fairly labile 178/87 --> 94/54  -Positive orthostatic vital signs last evening as documented in notes by nursing staff; s/p 1500 IVF bolus overnight  -Hold Lisinopril  -BP remains labile but improved today  -Monitor orthostatics Q shift    CAD s/p CABG  -Noted, continue BB, ACE    Atrial Fibrillation, new onset, resolved  -Converted spontaneously to NSR, TSH and lyes wnl, Echo unremarkable for this matter, shows diastolic dysfunction  -Initiated on Eliquis  -BB dose reduced due to bradycardia; currently rate-controlled    Bacterial pneumonia, resolved  -Possible CAP  on CXR imaging 1/13, s/p Zosyn/Cefuroxime and Doxycycline courses  -Hemodynamically stable on RA, currently without leukocytosis, afebrile    Dispo/Discharge Planning:  Anticipate d/c to SNF Wednesday if remains out of restraints    Diet:  ADULT ORAL NUTRITION SUPPLEMENT Breakfast, Lunch, Dinner; Standard High Calorie/High Protein  ADULT DIET Easy to Chew; Low Fat/Low Chol/High Fiber/2 gm Na  DVT PPx: Eliquis  Code status: DNR    Hospital Problems as of 1/25/2022 Date Reviewed: 1/4/2022          Codes Class Noted - Resolved POA    Acute encephalopathy ICD-10-CM: G93.40  ICD-9-CM: 348.30  1/15/2022 - Present Unknown        * (Principal) Metabolic encephalopathy [id number]-[id number]: G93.41  ICD-9-CM: 348.31  1/13/2022 - Present Unknown        Bacterial pneumonia ICD-10-CM: J15.9  ICD-9-CM: 482.9  1/13/2022 - Present Unknown        [facility] ICD-10-CM: G20  ICD-9-CM: 332.0 8/24/2016 - Present Unknown        Tremor ICD-10-CM: R25.1  ICD-9-CM: 781.0  8/24/2016 - Present Yes        Hyperlipidemia ICD-10-CM: E78.5  ICD-9-CM: 272.4  7/26/2016 - Present Yes        Parkinson's disease ([facility] ICD-10-CM: G20  ICD-9-CM: 332.0  7/26/2016 - Present Yes    Overview Signed 7/26/2016  1:54 PM by Christie COLLADO     Overview:   (Dr. Buster Nguyen)    Last Assessment & Plan:   States that he received a letter that his neurologist, Dr. Justus Villar has moved. Will arrange f/u appt with a different neurologist.               HTN (hypertension) ICD-10-CM: I10  ICD-9-CM: 401.9  11/27/2013 - Present Yes        Malignant neoplasm of prostate ([facility] ICD-10-CM: C61  ICD-9-CM: 185  9/13/2010 - Present Yes    Overview Signed 7/26/2016  1:54 PM by Christie COLLADO     Overview:   (Dr. Ari Chatman)                   Objective:     Patient Vitals for the past 24 hrs:   Temp Pulse Resp BP SpO2   01/25/22 1515 97.6 °F (36.4 °C) 65 18 (!) 122/107 99 %   01/25/22 1110 97.4 °F (36.3 °C) 64 18 (!) 97/58 100 %   01/25/22 0721 98 °F (36.7 °C) 72 17 (!) 187/77 97 %   01/25/22 0402 98.2 °F (36.8 °C) 76 18 (!) 142/68 97 %   01/25/22 0229 -- -- -- (!) 104/54 --   01/25/22 0153 -- -- -- (!) 98/48 --   01/25/22 0052 -- -- -- (!) 70/32 --   01/25/22 0030 -- -- -- (!) 66/33 --   01/25/22 0020 97.7 °F (36.5 °C) 66 18 (!) 74/44 97 %   01/24/22 2018 97.6 °F (36.4 °C) 79 18 (!) 152/75 97 %   01/24/22 1644 -- 77 -- (!) 143/70 --     Oxygen Therapy  O2 Sat (%): 99 % (01/25/22 1515)  Pulse via Oximetry: 64 beats per minute (01/14/22 1039)  O2 Device: None (Room air) (01/25/22 0912)    Estimated body mass index is 21.86 kg/m² as calculated from the following:    Height as of this encounter: 5' 8\" (1.727 m). Weight as of this encounter: 65.2 kg (143 lb 12.8 oz). No intake or output data in the 24 hours ending 01/25/22 1536      Physical Exam:   Blood pressure (!) 122/107, pulse 65, temperature 97.6 °F (36.4 °C), resp.  rate 18, height 5' 8\" (1.727 m), weight 65.2 kg (143 lb 12.8 oz), SpO2 99 %. General:    Frail. Alert. No acute distress. Oriented to self only, restless  Head:  Normocephalic, atraumatic  Eyes:  Sclerae appear normal.  Pupils equally round. Glasses intact  ENT:  Nares appear normal, no drainage. Moist oral mucosa  Neck:  No restricted ROM. Trachea midline   CV:   RRR. No m/r/g. No jugular venous distension. Lungs:   CTAB. No wheezing, rhonchi, or rales. Respirations even, unlabored on RA  Abdomen: Bowel sounds present. Soft, nontender, nondistended. Extremities: No cyanosis or clubbing. No edema  Skin:     No rashes and normal coloration. Warm and dry. Neuro:  CN II-XII grossly intact. Sensation intact. A&O to self only. restless, cooperative, follows commands and moves all 4 extremities.   Psych:  restless    I have reviewed ordered lab tests and independently visualized imaging below:    Recent Labs:  Recent Results (from the past 48 hour(s))   METABOLIC PANEL, BASIC    Collection Time: 01/25/22  5:25 AM   Result Value Ref Range    Sodium 143 136 - 145 mmol/L    Potassium 4.2 3.5 - 5.1 mmol/L    Chloride 114 (H) 98 - 107 mmol/L    CO2 24 21 - 32 mmol/L    Anion gap 5 (L) 7 - 16 mmol/L    Glucose 78 65 - 100 mg/dL    BUN 34 (H) 8 - 23 MG/DL    Creatinine 1.06 0.8 - 1.5 MG/DL    GFR est AA >60 >60 ml/min/1.73m2    GFR est non-AA >60 >60 ml/min/1.73m2    Calcium 8.5 8.3 - 10.4 MG/DL   SARS-COV-2    Collection Time: 01/25/22 11:41 AM   Result Value Ref Range    SARS-CoV-2 Please find results under separate order         All Micro Results     Procedure Component Value Units Date/Time    SARS-COV-2, PCR [[id number]] Collected: 01/25/22 1141    Order Status: Completed Updated: 01/25/22 1149    SARS-COV-2, PCR [[id number]] Collected: 01/22/22 0635    Order Status: Completed Specimen: Nasopharyngeal Updated: 01/22/22 1229     Specimen source Nasopharyngeal        SARS-CoV-2 Not detected        Comment:      The specimen is NEGATIVE for SARS-CoV-2, the novel coronavirus associated with COVID-19. This test has been authorized by the FDA under an Emergency Use Authorization (EUA) for use by authorized laboratories. Fact sheet for Healthcare Providers: Tarena.[email or web address]       Fact sheet for Patients: Tarena.       Methodology: RT-PCR         COVID-19 RAPID TEST [[id number]] Collected: 01/22/22 2846    Order Status: Completed Specimen: Nasopharyngeal Updated: 01/22/22 0702     Specimen source Nasopharyngeal        COVID-19 rapid test Not detected        Comment:      The specimen is NEGATIVE for SARS-CoV-2, the novel coronavirus associated with COVID-19. A negative result does not rule out COVID-19. This test has been authorized by the FDA under an Emergency Use Authorization (EUA) for use by authorized laboratories. Fact sheet for Healthcare Providers: Tarena.[email or web address]  Fact sheet for Patients: Tarena.       Methodology: Isothermal Nucleic Acid Amplification         SARS-COV-2, PCR [[id number]] Collected: 01/19/22 1519    Order Status: Completed Specimen: Nasopharyngeal Updated: 01/20/22 0132     Specimen source Nasopharyngeal        SARS-CoV-2 Not detected        Comment:      The specimen is NEGATIVE for SARS-CoV-2, the novel coronavirus associated with COVID-19. This test has been authorized by the FDA under an Emergency Use Authorization (EUA) for use by authorized laboratories.         Fact sheet for Healthcare Providers: Boond.co.hayden       Fact sheet for Patients: [employer]ond[employer]co.       Methodology: RT-PCR         BLOOD CULTURE [[id number]] Collected: 01/14/22 0532    Order Status: Completed Specimen: Blood Updated: 01/19/22 0742     Special Requests: --        LEFT  Antecubital       Culture result: NO GROWTH 5 DAYS       BLOOD CULTURE [[id number]] Collected: 01/13/22 1732    Order Status: Completed Specimen: Blood Updated: 01/18/22 0624     Special Requests: FOREARM        Culture result: NO GROWTH 5 DAYS       RESPIRATORY VIRUS PANEL W/COVID-19, PCR [[id number]] Collected: 01/13/22 2100    Order Status: Completed Specimen: Nasopharyngeal Updated: 01/13/22 2218     Adenovirus NOT DETECTED        Coronavirus 229E NOT DETECTED        Coronavirus HKU1 NOT DETECTED        Coronavirus CVNL63 NOT DETECTED        Coronavirus OC43 NOT DETECTED        SARS-CoV-2, PCR NOT DETECTED        Metapneumovirus NOT DETECTED        Rhinovirus and Enterovirus NOT DETECTED        Influenza A NOT DETECTED        Influenza B NOT DETECTED        Parainfluenza 1 NOT DETECTED        Parainfluenza 2 NOT DETECTED        Parainfluenza 3 NOT DETECTED        Parainfluenza virus 4 NOT DETECTED        RSV by PCR NOT DETECTED        B. parapertussis, PCR NOT DETECTED        Bordetella pertussis - PCR NOT DETECTED        Chlamydophila pneumoniae DNA, QL, PCR NOT DETECTED        Mycoplasma pneumoniae DNA, QL, PCR NOT DETECTED       COVID-19 RAPID TEST [[id number]] Collected: 01/13/22 1732    Order Status: Completed Specimen: Nasopharyngeal Updated: 01/13/22 1816     Specimen source NASAL        Comment: RAPID ONLY        COVID-19 rapid test Not detected        Comment:      The specimen is NEGATIVE for SARS-CoV-2, the novel coronavirus associated with COVID-19. A negative result does not rule out COVID-19. This test has been authorized by the FDA under an Emergency Use Authorization (EUA) for use by authorized laboratories. Fact sheet for Healthcare Providers: ConventionUpdate.co.nz  Fact sheet for Patients: ConventionUpdate.co.nz       Methodology: Isothermal Nucleic Acid Amplification               Other Studies:  No results found.     Current Meds:  Current Facility-Administered Medications   Medication Dose Route Frequency    [Held by provider] lisinopriL (PRINIVIL, ZESTRIL) tablet 40 mg  40 mg Oral DAILY    metoprolol tartrate (LOPRESSOR) tablet 12.5 mg  12.5 mg Oral BID    cloNIDine HCL (CATAPRES) tablet 0.1 mg  0.1 mg Oral Q4H PRN    apixaban (ELIQUIS) tablet 5 mg  5 mg Oral BID    metoprolol (LOPRESSOR) injection 5 mg  5 mg IntraVENous Q6H PRN    carbidopa-levodopa (PARCOPA)  mg rapid dissolve tablet 1.5 Tablet  1.5 Tablet Oral PRN    zonisamide (ZONEGRAN) capsule 25 mg  25 mg Oral DAILY    influenza vaccine 2021-22 (6 mos+)(PF) (FLUARIX/FLULAVAL/FLUZONE QUAD) injection 0.5 mL  1 Each IntraMUSCular PRIOR TO DISCHARGE    QUEtiapine (SEROquel) tablet 50 mg  50 mg Oral BID    sodium chloride (NS) flush 5-40 mL  5-40 mL IntraVENous Q8H    sodium chloride (NS) flush 5-40 mL  5-40 mL IntraVENous PRN    acetaminophen (TYLENOL) tablet 650 mg  650 mg Oral Q6H PRN    Or    acetaminophen (TYLENOL) suppository 650 mg  650 mg Rectal Q6H PRN    polyethylene glycol (MIRALAX) packet 17 g  17 g Oral DAILY PRN    ondansetron (ZOFRAN ODT) tablet 4 mg  4 mg Oral Q8H PRN    Or    ondansetron (ZOFRAN) injection 4 mg  4 mg IntraVENous Q6H PRN    carbidopa-levodopa ER (SINEMET CR)  mg per tablet 2 Tablet  2 Tablet Oral QHS    pimavanserin cap 34 mg (Nuplazid) pt supplied (Patient Supplied)  34 mg Oral DAILY    carbidopa-levodopa (SINEMET)  mg per tablet 1.5 Tablet  1.5 Tablet Oral QID     Labs, vital signs, diagnostic testing reviewed. Case discussed with patient, care team, Dr. Kandis Judge. Signed:  Ernesto Lerner NP    Part of this note may have been written by using a voice dictation software. The note has been proof read but may still contain some grammatical/other typographical errors.

## 2022-01-25 NOTE — PROGRESS NOTES
Problem: Falls - Risk of  Goal: *Absence of Falls  Description: Document Brianna Isabel Fall Risk and appropriate interventions in the flowsheet.   Outcome: Progressing Towards Goal  Note: Fall Risk Interventions:  Mobility Interventions: Bed/chair exit alarm    Mentation Interventions: Bed/chair exit alarm    Medication Interventions: Bed/chair exit alarm    Elimination Interventions: Bed/chair exit alarm    History of Falls Interventions: Bed/chair exit alarm         Problem: Patient Education: Go to Patient Education Activity  Goal: Patient/Family Education  Outcome: Progressing Towards Goal

## 2022-01-25 NOTE — PROGRESS NOTES
Comprehensive Nutrition Assessment    Type and Reason for Visit: Initial,RD nutrition re-screen/LOS    Nutrition Recommendations/Plan:   Meals and Snacks:  Continue current diet. Nutrition Supplement Therapy:   Medical food supplement therapy:  Initiate Ensure Enlive three times per day (this provides 350 kcal and 20 grams protein per bottle)     Malnutrition Assessment:  Malnutrition Status: Moderate malnutrition  Context: Chronic illness  Findings of clinical characteristics of malnutrition:   Energy Intake:  Unable to assess  Weight Loss:  7.0 - Greater than 7.5% over 3 months     Body Fat Loss:  1 - Mild body fat loss, Triceps,Orbital   Muscle Mass Loss:  1 - Mild muscle mass loss, Clavicles (pectoralis &deltoids),Temples (temporalis),Thigh (quadriceps)  Fluid Accumulation:  No significant fluid accumulation,     Strength:  Not performed     Nutrition Assessment:   Nutrition History: Unable to assess r/t clinical condition and AMS. Per EMR review, pt with poor appetite during and prior to admission. Pt with 13% wt loss over 3 months per MD encounter weights. Nutrition Background: Pt with PMH significant for prostate cancer, CAD s/p CABG, HTN, HLD, parkinsons disease admitted for AMS 1/13. Daily Update:  Pt seen at bedside, confused and oriented to self only. Pt states he needed to \"catch a train. \" Pt able to state he wasn't hungry, but was agreeable to trying ONS- requested strawberry flavor. RD provided and pt consumed 100% of ONS during RD assessment. Breakfast tray at bedside with ~25% consumed. Discussed pt with RN. Nutrition Related Findings:   NFPE findings as above. ETC diet 1/13.       Current Nutrition Therapies:  ADULT DIET Easy to Chew; Low Fat/Low Chol/High Fiber/2 gm Na    Current Intake:   Average Meal Intake: 26-50% Average Supplement Intake: None ordered      Anthropometric Measures:  Height: 5' 8\" (172.7 cm)  Current Body Wt: 65.2 kg (143 lb 11.8 oz) (1/23), Weight source: Bed scale  BMI: 21.9, Underweight (BMI less than 22) age over 72     Ideal Body Weight (lbs) (Calculated): 154 lbs (70 kg), 93.3 %  Usual Body Wt: 74.8 kg (165 lb) (per MD encounter weight 11/16/2021), Percent weight change: -12.9          Edema: LUE: 1+ (1/24/2022  7:30 PM)     Estimated Daily Nutrient Needs:  Energy (kcal/day): 9377-3076 (Kcal/kg (25-30), Weight Used: Current (65.2kg))  Protein (g/day): 66-79 (1-1.2g/kg) Weight Used: (Current (65.2kg))  Fluid (ml/day):   (1 ml/kcal)    Nutrition Diagnosis:   · Inadequate oral intake related to cognitive or neurological impairment as evidenced by intake 26-50% (parkinson's disease, inability to focus on eating d/t AMS)    · Moderate malnutrition,In context of chronic illness related to inadequate protein-energy intake as evidenced by  (malnutrition criteria as defined above)       Nutrition Interventions:   Food and/or Nutrient Delivery: Continue current diet,Start oral nutrition supplement     Coordination of Nutrition Care: Continue to monitor while inpatient,Interdisciplinary rounds  Plan of Care discussed with Loren Mendez RN and IDT Rounds     Goals:       Active Goal: Meet >75% of estimated needs via PO intake by next RD assessment    Nutrition Monitoring and Evaluation:      Food/Nutrient Intake Outcomes: Food and nutrient intake,Supplement intake  Physical Signs/Symptoms Outcomes: Meal time behavior,Weight    Discharge Planning:    Continue oral nutrition supplement    Federica Beaver RD, LD  Contact: [phone]      Disaster Mode Active

## 2022-01-26 VITALS
HEART RATE: 61 BPM | WEIGHT: 143.8 LBS | BODY MASS INDEX: 21.79 KG/M2 | RESPIRATION RATE: 15 BRPM | OXYGEN SATURATION: 100 % | TEMPERATURE: 97.4 F | DIASTOLIC BLOOD PRESSURE: 78 MMHG | SYSTOLIC BLOOD PRESSURE: 154 MMHG | HEIGHT: 68 IN

## 2022-01-26 LAB
ANION GAP SERPL CALC-SCNC: 7 MMOL/L (ref 7–16)
BUN SERPL-MCNC: 23 MG/DL (ref 8–23)
CALCIUM SERPL-MCNC: 9.4 MG/DL (ref 8.3–10.4)
CHLORIDE SERPL-SCNC: 110 MMOL/L (ref 98–107)
CO2 SERPL-SCNC: 26 MMOL/L (ref 21–32)
CREAT SERPL-MCNC: 0.91 MG/DL (ref 0.8–1.5)
GLUCOSE SERPL-MCNC: 101 MG/DL (ref 65–100)
POTASSIUM SERPL-SCNC: 3.8 MMOL/L (ref 3.5–5.1)
SODIUM SERPL-SCNC: 143 MMOL/L (ref 138–145)

## 2022-01-26 PROCEDURE — 74011250637 HC RX REV CODE- 250/637: Performed by: NURSE PRACTITIONER

## 2022-01-26 PROCEDURE — 80048 BASIC METABOLIC PNL TOTAL CA: CPT

## 2022-01-26 PROCEDURE — 74011000250 HC RX REV CODE- 250: Performed by: FAMILY MEDICINE

## 2022-01-26 PROCEDURE — 74011250637 HC RX REV CODE- 250/637: Performed by: INTERNAL MEDICINE

## 2022-01-26 PROCEDURE — 2709999900 HC NON-CHARGEABLE SUPPLY

## 2022-01-26 PROCEDURE — 74011250637 HC RX REV CODE- 250/637: Performed by: HOSPITALIST

## 2022-01-26 PROCEDURE — 36415 COLL VENOUS BLD VENIPUNCTURE: CPT

## 2022-01-26 PROCEDURE — 74011250637 HC RX REV CODE- 250/637: Performed by: FAMILY MEDICINE

## 2022-01-26 RX ORDER — PYRIDOSTIGMINE BROMIDE 60 MG/1
60 TABLET ORAL EVERY 8 HOURS
Status: DISCONTINUED | OUTPATIENT
Start: 2022-01-26 | End: 2022-01-26

## 2022-01-26 RX ADMIN — CARBIDOPA AND LEVODOPA 1.5 TABLET: 25; 100 TABLET ORAL at 10:16

## 2022-01-26 RX ADMIN — SODIUM CHLORIDE, PRESERVATIVE FREE 10 ML: 5 INJECTION INTRAVENOUS at 05:05

## 2022-01-26 RX ADMIN — CLONIDINE HYDROCHLORIDE 0.1 MG: 0.1 TABLET ORAL at 02:58

## 2022-01-26 RX ADMIN — METOPROLOL TARTRATE 12.5 MG: 25 TABLET, FILM COATED ORAL at 10:16

## 2022-01-26 RX ADMIN — ZONISAMIDE 25 MG: 25 CAPSULE ORAL at 10:16

## 2022-01-26 RX ADMIN — APIXABAN 5 MG: 5 TABLET, FILM COATED ORAL at 10:16

## 2022-01-26 NOTE — PROGRESS NOTES
Problem: Falls - Risk of  Goal: *Absence of Falls  Description: Document Evelyne Ground Fall Risk and appropriate interventions in the flowsheet.   Outcome: Progressing Towards Goal  Note: Fall Risk Interventions:  Mobility Interventions: Bed/chair exit alarm    Mentation Interventions: Bed/chair exit alarm    Medication Interventions: Bed/chair exit alarm    Elimination Interventions: Bed/chair exit alarm    History of Falls Interventions: Bed/chair exit alarm         Problem: Patient Education: Go to Patient Education Activity  Goal: Patient/Family Education  Outcome: Progressing Towards Goal     Problem: Anxiety  Goal: *Alleviation of anxiety  Outcome: Progressing Towards Goal  Goal: *Alleviation of anxiety (Palliative Care)  Outcome: Progressing Towards Goal     Problem: Patient Education: Go to Patient Education Activity  Goal: Patient/Family Education  Outcome: Progressing Towards Goal

## 2022-01-26 NOTE — DISCHARGE SUMMARY
Hospitalist Discharge Summary   Admit Date:  2022  5:12 PM   DC Note date: 2022  Name:  Zaire Cope   Age:  82 y.o. Sex:  male  :  1935   MRN:  [id number]   Room:  [facility]  PCP:  Anais Guzman MD    Presenting Complaint: Altered mental status    Initial Admission Diagnosis: Metabolic encephalopathy [E60.66]  Bacterial pneumonia [J15.9]  Parkinson disease ([facility] [G20]  Acute encephalopathy [G93.40]     Problem List for this Hospitalization:  Hospital Problems as of 2022 Date Reviewed: 2022          Codes Class Noted - Resolved POA    Acute encephalopathy ICD-10-CM: G93.40  ICD-9-CM: 348.30  1/15/2022 - Present Unknown        * (Principal) Metabolic encephalopathy SOR-32-ZA: G93.41  ICD-9-CM: 348.31  2022 - Present Unknown        Bacterial pneumonia ICD-10-CM: J15.9  ICD-9-CM: 482.9  2022 - Present Unknown        Parkinson disease ([facility] ICD-10-CM: Fritz Lanes  ICD-9-CM: 332.0  2016 - Present Unknown        Tremor ICD-10-CM: R25.1  ICD-9-CM: 781.0  2016 - Present Yes        Hyperlipidemia ICD-10-CM: E78.5  ICD-9-CM: 272.4  2016 - Present Yes        Parkinson's disease ([facility] ICD-10-CM: Fritz Lanes  ICD-9-CM: 332.0  2016 - Present Yes    Overview Signed 2016  1:54 PM by Carole COLLADO     Overview:   (Dr. Pilo Leos)    Last Assessment & Plan:   States that he received a letter that his neurologist, Dr. Nannette Abraham has moved.   Will arrange f/u appt with a different neurologist.               HTN (hypertension) ICD-10-CM: I10  ICD-9-CM: 401.9  2013 - Present Yes        Malignant neoplasm of prostate ([facility] ICD-10-CM: C61  ICD-9-CM: 185  2010 - Present Yes    Overview Signed 2016  1:54 PM by Carole COLLADO     Overview:   (Dr. Scar Lindsey)                 Did Patient have Sepsis (YES OR NO): No    Hospital Course:  Patient is an 81 y/o male with PMH prostate cancer, CAD s/p CABG, HTN, HLD, Parkinson's disease with associated psychosis who presented to ED 1/13 for cc AMS. Neurology evaluated patient for medications recommendations. Patient completed antibiotic course for possible CAP. Palliative care did evaluate patient; hospice will be considered if he does not improve at rehab per spouse.     Patient cleared for discharge to skilled nursing facility 1/21 but could not transfer due to restraint use 1/19/21. Discharge ordered 1/23 however patient has remained in mittens for which facility considers this a restraint. Patient must remain out of restraints (to include extremity restraints, mittens, lap belt) for 48 hours in order to discharge to facility. Discharge canceled. He has remained out of restraints and is stable for discharge to rehab today. Assessment & Plan:          Metabolic encephalopathy  Parkinson's Disease, Advanced  -Mentation waxes and wanes  -Follows neurology outpatient, neurology evaluated inpatient; they discontinued Azilact as it is related to AMS/Hallucinations  -Continue per Neurology: Sinemet, Nuplazid, Zonisamide, Seroquel; do not stop meds  -Avoid Geodon/Haldol  -Patient currently alert to self only; calm and cooperative  -Non-pharmacologic management of delirium:  · Reorient the patient throughout the day  · Window open and lights on during the day. Lights off, television off, noises down during the night. If able, decrease nursing checks at night  · Therapies as often as possible  · Avoid restraints  · Avoid sensory deprivation by using glasses and hearing aids, if applicable        Active Problems:  Malignant neoplasm of prostate (HCC)   -Noted     HTN (hypertension)  -Increased with agitation  -Continue Lopressor, prn Clonidine  -lisinopril discontinued  Secondary to orthostatic hypotension.   -bp within norm for his age  - if lisinopril has to be re-instated would give bid dosing secondary to occasional orthostatic hypotension.     Hypotension  -improved with ivfls & holding of lisinopril   - he has labile bp secondary to autonomic dysfunction associated with advanced parkinsons disease       CAD s/p CABG  -Noted, continue BB, ACE     Atrial Fibrillation, new onset, resolved  -Converted spontaneously to NSR, TSH and lyes wnl, Echo unremarkable for this matter, shows diastolic dysfunction  -Initiated on Eliquis  -BB dose reduced due to bradycardia; currently rate-controlled     Bacterial pneumonia, resolved  -Possible CAP  on CXR imaging 1/13, s/p Zosyn/Cefuroxime and Doxycycline courses  -Hemodynamically stable on RA, currently without leukocytosis, afebrile          Disposition: Skilled Nursing Facility   Diet: ADULT ORAL NUTRITION SUPPLEMENT Breakfast, Lunch, Dinner; Standard High Calorie/High Protein  ADULT DIET Easy to Chew; Low Fat/Low Chol/High Fiber/2 gm Na  Code Status: DNR    Follow Up Orders: Follow-up Appointments   Procedures    FOLLOW UP VISIT Appointment in: One Week PCP 1 week Neurology 1 month     PCP 1 week  Neurology 1 month     Standing Status:   Standing     Number of Occurrences:   1     Order Specific Question:   Appointment in     Answer: One Week       Follow-up Information     Follow up With Specialties Details Why Contact Info    [address] [place] [address] [place] [id number] [facility], Red Francis MD Internal Medicine   [facility] [id number] [phone]      [facility] In 2 months  [place] [id number] [phone]          Follow up labs/diagnostics (ultimately defer to outpatient provider):  none    Time spent in patient discharge and coordination 40  minutes. Plan was discussed with the pt and idt. All questions answered. Patient was stable at time of discharge. Instructions given to call a physician or return if any concerns.     Discharge Info:   Current Discharge Medication List      START taking these medications    Details apixaban (ELIQUIS) 5 mg tablet Take 1 Tablet by mouth two (2) times a day for 30 days. Qty: 60 Tablet, Refills: 0  Start date: 1/23/2022, End date: 2/22/2022      zonisamide (ZONEGRAN) 25 mg capsule Take 1 Capsule by mouth daily for 30 days. Qty: 30 Capsule, Refills: 0  Start date: 1/23/2022, End date: 2/22/2022      metoprolol tartrate (LOPRESSOR) 25 mg tablet Take 0.5 Tablets by mouth two (2) times a day for 30 days. Qty: 30 Tablet, Refills: 0  Start date: 1/23/2022, End date: 2/22/2022         CONTINUE these medications which have CHANGED    Details   carbidopa-levodopa ER (SINEMET CR)  mg per tablet TAKE 1 TABLET BY MOUTH NIGHTLY  Qty: 180 Tablet, Refills: 3  Start date: 1/23/2022    Associated Diagnoses: Parkinson disease ([address]      QUEtiapine (SEROquel) 25 mg tablet Take 2 Tablets by mouth two (2) times a day. Take 2 tablets at noon and 2 tablets QHS. Qty: 90 Tablet, Refills: 2  Start date: 1/23/2022    Associated Diagnoses: Parkinson disease ([address] Anxiety         CONTINUE these medications which have NOT CHANGED    Details   donepeziL (ARICEPT) 10 mg tablet TAKE 1 TABLET BY MOUTH EVERY DAY  Qty: 90 Tablet, Refills: 0      pimavanserin (Nuplazid) 34 mg cap Take 1 Capsule by mouth daily. Qty: 90 Capsule, Refills: 3      carbidopa-levodopa (SINEMET)  mg per tablet TAKE 1 & 1/2 (ONE & ONE-HALF) TABLETS BY MOUTH 4 TIMES DAILY AT 8AM, NOON, 4PM AND 8PM.  Qty: 540 Tab, Refills: 0    Associated Diagnoses: Parkinson disease (HCC)      cloNIDine HCL (CATAPRES) 0.1 mg tablet Take 0.1 mg by mouth two (2) times a day. cholecalciferol (VITAMIN D3) 1,000 unit cap Take  by mouth daily. atorvastatin (LIPITOR) 80 mg tablet Take 80 mg by mouth nightly. Aspirin, Buffered 81 mg tab Take  by mouth.       OTHER PT for postural imbalance and posture  SPeech therapy for hypophonia  Qty: 1 Act, Refills: 0      nitroglycerin (NITROSTAT) 0.4 mg SL tablet by SubLINGual route every five (5) minutes as needed for Chest Pain. STOP taking these medications       rasagiline (AZILECT) 1 mg tablet Comments:   Reason for Stopping:               Procedures done this admission:  * No surgery found *    Consults this admission:  IP CONSULT TO NEUROLOGY  IP CONSULT TO PALLIATIVE CARE - PROVIDER    Echocardiogram/EKG results:  Results from Hospital Encounter encounter on 01/13/22    ECHO ADULT COMPLETE    Interpretation Summary    Left Ventricle: Left ventricle size is normal. Mildly increased wall thickness. Normal wall motion. Normal left ventricular systolic function.   Aortic Valve: Mildly calcified cusps. Mild stenosis. AV mean gradient is 13 mmHg. AV peak gradient is 25 mmHg.   Mitral Valve: Mild mitral annular calcification. Mild transvalvular regurgitation.   Left Atrium: Left atrium is moderately dilated.        EKG Results     Procedure 720 Value Units Date/Time    EKG, 12 LEAD, SUBSEQUENT [[id number]] Collected: 01/16/22 1400    Order Status: Completed Updated: 01/16/22 1721     Ventricular Rate 69 BPM      Atrial Rate 276 BPM      QRS Duration 144 ms      Q-T Interval 480 ms      QTC Calculation (Bezet) 514 ms      Calculated P Axis 93 degrees      Calculated R Axis -9 degrees      Calculated T Axis 19 degrees      Diagnosis --     Atrial flutter with 4:1 A-V conduction  Right bundle branch block  Minimal voltage criteria for LVH, may be normal variant  Inferior infarct , age undetermined  Anterolateral infarct , age undetermined  Abnormal ECG  When compared with ECG of prior   Attrial flutter has replaced sinus rhythm    Confirmed by Cheryll Severe MD, Ryan Higginbotham ([id number]) on 1/16/2022 5:21:24 PM      EKG, 12 LEAD, INITIAL [[id number]] Collected: 01/15/22 0634    Order Status: Completed Updated: 01/15/22 1845     Ventricular Rate 67 BPM      Atrial Rate 67 BPM      P-R Interval 156 ms      QRS Duration 154 ms      Q-T Interval 468 ms      QTC Calculation (Bezet) 494 ms      Calculated P Axis 45 degrees Calculated R Axis -23 degrees      Calculated T Axis -15 degrees      Diagnosis --     Normal sinus rhythm  Right bundle branch block  Minimal voltage criteria for LVH, may be normal variant  Inferior infarct , age undetermined  When compared with ECG of 13-JAN-2022 17:39,  No significant change was found  Confirmed by Sergey Reed MD ([id number]) on 1/15/2022 6:44:57 PM      EKG, 12 LEAD, INITIAL [[id number]]     Order Status: Canceled     EKG, 12 LEAD, INITIAL [[id number]] Collected: 01/13/22 1739    Order Status: Completed Updated: 01/14/22 1253     Ventricular Rate 83 BPM      Atrial Rate 83 BPM      P-R Interval 162 ms      QRS Duration 159 ms      Q-T Interval 419 ms      QTC Calculation (Bezet) 493 ms      Calculated P Axis 73 degrees      Calculated R Axis -11 degrees      Calculated T Axis 17 degrees      Diagnosis --     Sinus rhythm  Atrial premature complex  Right bundle branch block  Inferior infarct, age indeterminate  Anterolateral infarct, age indeterminate    Confirmed by Sergey Reed MD ([id number]) on 1/14/2022 12:53:36 PM            Diagnostic Imaging/Tests:   XR HAND LT MIN 3 V    Result Date: 1/13/2022  Left hand INDICATION: Left hand injury, pain Three views of the left hand were obtained. FINDINGS: There is no evidence of fracture or dislocation. There are no bony erosions. Joint space narrowing in all 5 digits. .     Osteoarthritis. No acute findings. CT HEAD WO CONT    Result Date: 1/13/2022  Head CT INDICATION: Altered mental status Multiple axial images obtained through the brain without intravenous contrast. Radiation dose reduction techniques were used for this study: All CT scans performed at this facility use one or all of the following: Automated exposure control, adjustment of the mA and/or kVp according to patient's size, iterative reconstruction. FINDINGS: There is mild chronic change in the white matter and basal ganglia.  There is no CT evidence of acute hemorrhage or infarction. The ventricles are normal in size. There are no extra-axial fluid collections. No masses are seen. The sinuses are clear. There are no bony lesions. No CT evidence of acute intracranial abnormality. XR CHEST PORT    Result Date: 1/13/2022  CHEST X-RAY, single portable view  1/13/2022 History: Punched a chair and having hallucinations. Technique: Single frontal view of the chest. Comparison: None. Findings: The patient is status post median sternotomy. The cardiac silhouette is mildly enlarged on this AP study. The lungs are expanded without evidence for pneumothorax. No dense consolidative airspace process or pleural effusion is seen. However, mild peripheral opacities are seen in the bilateral mid and lower lung fields for which an early peripheral infiltrate cannot be excluded. 1.  Mild peripheral opacities in the mid and lower lung fields. An early or mild peripheral infiltrate as can occur with a viral pneumonia such as Covid 19 is not excluded. This could either represent active infection or post inflammatory changes if the patient has recently had Covid pneumonia. Recommend clinical correlation. This report was made using voice transcription. Despite my best efforts to avoid any, transcription errors may persist. If there is any question about the accuracy of the report or need for clarification, then please call [phone], or text me through perfectserv for clarification or correction. All Micro Results     Procedure Component Value Units Date/Time    SARS-COV-2, PCR [[id number]] Collected: 01/25/22 1141    Order Status: Completed Specimen: Nasopharyngeal Updated: 01/25/22 1539     Specimen source Nasopharyngeal        SARS-CoV-2 Not detected        Comment:      The specimen is NEGATIVE for SARS-CoV-2, the novel coronavirus associated with COVID-19.        This test has been authorized by the FDA under an Emergency Use Authorization (EUA) for use by authorized laboratories. Fact sheet for Healthcare Providers: [employer].       Fact sheet for Patients: [employer].       Methodology: RT-PCR         SARS-COV-2, PCR [[id number]] Collected: 01/22/22 4106    Order Status: Completed Specimen: Nasopharyngeal Updated: 01/22/22 1229     Specimen source Nasopharyngeal        SARS-CoV-2 Not detected        Comment:      The specimen is NEGATIVE for SARS-CoV-2, the novel coronavirus associated with COVID-19. This test has been authorized by the FDA under an Emergency Use Authorization (EUA) for use by authorized laboratories. Fact sheet for Healthcare Providers: [employer]       Fact sheet for Patients: [employer].       Methodology: RT-PCR         COVID-19 RAPID TEST [[id number]] Collected: 01/22/22 1465    Order Status: Completed Specimen: Nasopharyngeal Updated: 01/22/22 0702     Specimen source Nasopharyngeal        COVID-19 rapid test Not detected        Comment:      The specimen is NEGATIVE for SARS-CoV-2, the novel coronavirus associated with COVID-19. A negative result does not rule out COVID-19. This test has been authorized by the FDA under an Emergency Use Authorization (EUA) for use by authorized laboratories. Fact sheet for Healthcare Providers: [employer].  Fact sheet for Patients: [employer].       Methodology: Isothermal Nucleic Acid Amplification         SARS-COV-2, PCR [[id number]] Collected: 01/19/22 1519    Order Status: Completed Specimen: Nasopharyngeal Updated: 01/20/22 0132     Specimen source Nasopharyngeal        SARS-CoV-2 Not detected        Comment:      The specimen is NEGATIVE for SARS-CoV-2, the novel coronavirus associated with COVID-19.        This test has been authorized by the FDA under an Emergency Use Authorization (EUA) for use by authorized laboratories. Fact sheet for Healthcare Providers: [employer]       Fact sheet for Patients: [employer]       Methodology: RT-PCR         BLOOD CULTURE [[id number]] Collected: 01/14/22 0532    Order Status: Completed Specimen: Blood Updated: 01/19/22 0742     Special Requests: --        LEFT  Antecubital       Culture result: NO GROWTH 5 DAYS       BLOOD CULTURE [[id number]] Collected: 01/13/22 1732    Order Status: Completed Specimen: Blood Updated: 01/18/22 0624     Special Requests: FOREARM        Culture result: NO GROWTH 5 DAYS       RESPIRATORY VIRUS PANEL W/COVID-19, PCR [[id number]] Collected: 01/13/22 2100    Order Status: Completed Specimen: Nasopharyngeal Updated: 01/13/22 2218     Adenovirus NOT DETECTED        Coronavirus 229E NOT DETECTED        Coronavirus HKU1 NOT DETECTED        Coronavirus CVNL63 NOT DETECTED        Coronavirus OC43 NOT DETECTED        SARS-CoV-2, PCR NOT DETECTED        Metapneumovirus NOT DETECTED        Rhinovirus and Enterovirus NOT DETECTED        Influenza A NOT DETECTED        Influenza B NOT DETECTED        Parainfluenza 1 NOT DETECTED        Parainfluenza 2 NOT DETECTED        Parainfluenza 3 NOT DETECTED        Parainfluenza virus 4 NOT DETECTED        RSV by PCR NOT DETECTED        B. parapertussis, PCR NOT DETECTED        Bordetella pertussis - PCR NOT DETECTED        Chlamydophila pneumoniae DNA, QL, PCR NOT DETECTED        Mycoplasma pneumoniae DNA, QL, PCR NOT DETECTED       COVID-19 RAPID TEST [[id number]] Collected: 01/13/22 1732    Order Status: Completed Specimen: Nasopharyngeal Updated: 01/13/22 1816     Specimen source NASAL        Comment: RAPID ONLY        COVID-19 rapid test Not detected        Comment:      The specimen is NEGATIVE for SARS-CoV-2, the novel coronavirus associated with COVID-19. A negative result does not rule out COVID-19.        This test has been authorized by the FDA under an Emergency Use Authorization (EUA) for use by authorized laboratories. Fact sheet for Healthcare Providers: [employer]  Fact sheet for Patients: [employer]       Methodology: Isothermal Nucleic Acid Amplification               Labs: Results:       BMP, Mg, Phos Recent Labs     01/26/22  0516 01/25/22  0525    143   K 3.8 4.2   * 114*   CO2 26 24   AGAP 7 5*   BUN 23 34*   CREA 0.91 1.06   CA 9.4 8.5   * 78      CBC No results for input(s): WBC, RBC, HGB, HCT, PLT, GRANS, LYMPH, EOS, MONOS, BASOS, IG, ANEU, ABL, KEITH, ABM, ABB, AIG, HGBEXT, HCTEXT, PLTEXT in the last 72 hours. LFT No results for input(s): ALT, TBIL, AP, TP, ALB, GLOB, AGRAT in the last 72 hours.     No lab exists for component: SGOT, GPT   Cardiac Testing No results found for: BNPP, BNP, CPK, RCK1, RCK2, RCK3, RCK4, CKMB, CKNDX, CKND1, TROPT, TROIQ   Coagulation Tests Lab Results   Component Value Date/Time    Prothrombin time 10.1 05/06/2010 01:15 PM    INR 1.0 05/06/2010 01:15 PM    aPTT 25.0 05/06/2010 01:15 PM      A1c No results found for: HBA1C, [id number]   Lipid Panel No results found for: CHOL, CHOLPOCT, CHOLX, CHLST, CHOLV, 091614, HDL, HDLP, LDL, LDLC, DLDLP, 561712, VLDLC, VLDL, TGLX, TRIGL, TRIGP, TGLPOCT, CHHD, [facility]   Thyroid Panel Lab Results   Component Value Date/Time    TSH 1.400 01/13/2022 05:32 PM    T4, Free 1.0 01/13/2022 05:32 PM        Most Recent UA Lab Results   Component Value Date/Time    Color YELLOW 01/13/2022 05:44 PM    Appearance CLEAR 01/13/2022 05:44 PM    pH (UA) 5.5 01/13/2022 05:44 PM    Protein TRACE (A) 01/13/2022 05:44 PM    Glucose Negative 01/13/2022 05:44 PM    Ketone TRACE (A) 01/13/2022 05:44 PM    Bilirubin Negative 01/13/2022 05:44 PM    Blood Negative 01/13/2022 05:44 PM    Urobilinogen 1.0 01/13/2022 05:44 PM    Nitrites Negative 01/13/2022 05:44 PM    Leukocyte Esterase Negative 01/13/2022 05:44 PM WBC 0-3 01/13/2022 05:44 PM    RBC 0-3 01/13/2022 05:44 PM    Epithelial cells 0-3 01/13/2022 05:44 PM    Bacteria 0 01/13/2022 05:44 PM    Casts 10-20 01/13/2022 05:44 PM    Mucus 1+ (H) 01/13/2022 05:44 PM          All Labs from Last 24 Hrs:  Recent Results (from the past 24 hour(s))   SARS-COV-2    Collection Time: 01/25/22 11:41 AM   Result Value Ref Range    SARS-CoV-2 Please find results under separate order     SARS-COV-2, PCR    Collection Time: 01/25/22 11:41 AM    Specimen: Nasopharyngeal   Result Value Ref Range    Specimen source Nasopharyngeal      SARS-CoV-2 Not detected NOTD     METABOLIC PANEL, BASIC    Collection Time: 01/26/22  5:16 AM   Result Value Ref Range    Sodium 143 138 - 145 mmol/L    Potassium 3.8 3.5 - 5.1 mmol/L    Chloride 110 (H) 98 - 107 mmol/L    CO2 26 21 - 32 mmol/L    Anion gap 7 7 - 16 mmol/L    Glucose 101 (H) 65 - 100 mg/dL    BUN 23 8 - 23 MG/DL    Creatinine 0.91 0.8 - 1.5 MG/DL    GFR est AA >60 >60 ml/min/1.73m2    GFR est non-AA >60 >60 ml/min/1.73m2    Calcium 9.4 8.3 - 10.4 MG/DL       Current Med List in Hospital:   Current Facility-Administered Medications   Medication Dose Route Frequency    [Held by provider] lisinopriL (PRINIVIL, ZESTRIL) tablet 40 mg  40 mg Oral DAILY    metoprolol tartrate (LOPRESSOR) tablet 12.5 mg  12.5 mg Oral BID    cloNIDine HCL (CATAPRES) tablet 0.1 mg  0.1 mg Oral Q4H PRN    apixaban (ELIQUIS) tablet 5 mg  5 mg Oral BID    metoprolol (LOPRESSOR) injection 5 mg  5 mg IntraVENous Q6H PRN    carbidopa-levodopa (PARCOPA)  mg rapid dissolve tablet 1.5 Tablet  1.5 Tablet Oral PRN    zonisamide (ZONEGRAN) capsule 25 mg  25 mg Oral DAILY    influenza vaccine 2021-22 (6 mos+)(PF) (FLUARIX/FLULAVAL/FLUZONE QUAD) injection 0.5 mL  1 Each IntraMUSCular PRIOR TO DISCHARGE    QUEtiapine (SEROquel) tablet 50 mg  50 mg Oral BID    sodium chloride (NS) flush 5-40 mL  5-40 mL IntraVENous Q8H    sodium chloride (NS) flush 5-40 mL  5-40 mL IntraVENous PRN    acetaminophen (TYLENOL) tablet 650 mg  650 mg Oral Q6H PRN    Or    acetaminophen (TYLENOL) suppository 650 mg  650 mg Rectal Q6H PRN    polyethylene glycol (MIRALAX) packet 17 g  17 g Oral DAILY PRN    ondansetron (ZOFRAN ODT) tablet 4 mg  4 mg Oral Q8H PRN    Or    ondansetron (ZOFRAN) injection 4 mg  4 mg IntraVENous Q6H PRN    carbidopa-levodopa ER (SINEMET CR)  mg per tablet 2 Tablet  2 Tablet Oral QHS    pimavanserin cap 34 mg (Nuplazid) pt supplied (Patient Supplied)  34 mg Oral DAILY    carbidopa-levodopa (SINEMET)  mg per tablet 1.5 Tablet  1.5 Tablet Oral QID       Allergies   Allergen Reactions    Adhesive Tape Rash    Geodon [Ziprasidone Hcl] Other (comments)     Contraindicated with parkinsons     Immunization History   Administered Date(s) Administered    Influenza High Dose Vaccine PF 10/02/2013, 10/23/2015    Influenza Vaccine 10/21/2008    Pneumococcal Conjugate (PCV-13) 04/26/2016    Pneumococcal Polysaccharide (PPSV-23) 10/09/2013    TB Skin Test (PPD) Intradermal 01/14/2022    Zoster Vaccine, Live 02/13/2014       Recent Vital Data:  Patient Vitals for the past 24 hrs:   Temp Pulse Resp BP SpO2   01/26/22 0717 97.4 °F (36.3 °C) (!) 59 15 (!) 156/83 100 %   01/26/22 0602 97.4 °F (36.3 °C) 67 20 (!) 149/78 96 %   01/26/22 0232 98.1 °F (36.7 °C) 68 -- (!) 168/73 --   01/25/22 1905 98.2 °F (36.8 °C) 72 18 (!) 149/75 97 %   01/25/22 1627 -- -- -- (!) 182/90 --   01/25/22 1515 97.6 °F (36.4 °C) 65 18 (!) 122/107 99 %   01/25/22 1110 97.4 °F (36.3 °C) 64 18 (!) 97/58 100 %     Oxygen Therapy  O2 Sat (%): 100 % (01/26/22 0717)  Pulse via Oximetry: 64 beats per minute (01/14/22 1039)  O2 Device: None (Room air) (01/25/22 1905)    Estimated body mass index is 21.86 kg/m² as calculated from the following:    Height as of this encounter: 5' 8\" (1.727 m). Weight as of this encounter: 65.2 kg (143 lb 12.8 oz).   No intake or output data in the 24 hours ending 01/26/22 0739      Physical Exam:  General:    frail. No overt distress  Head:  Normocephalic, atraumatic  Eyes:  Sclerae appear normal.  Pupils equally round. HENT:  Nares appear normal, no drainage. Moist mucous membranes  Neck:  No restricted ROM. Trachea midline  CV:   RRR. No m/r/g. No JVD  Lungs:   CTAB. No wheezing, rhonchi, or rales. Even, unlabored  Abdomen:   Soft, nontender, nondistended. Extremities: Warm and dry. No cyanosis or clubbing. No edema. Skin:     No rashes. Normal coloration  Neuro:  CN II-XII grossly intact. Psych:  Normal mood and affect. Signed:  Zuly Servin MD    Part of this note may have been written by using a voice dictation software. The note has been proof read but may still contain some grammatical/other typographical errors.

## 2022-01-26 NOTE — PROGRESS NOTES
01/26/22 0832   Vital Signs   Pulse (Heart Rate) 61   BP (!) 154/78   MAP (Calculated) 103   BP Patient Position Supine   Additional Blood Pressure/Pulse Data   Pulse 2 112   BP 2 119/62   MAP 2 (Calculated) 81   Patient Position 2 Sitting   Pulse 3 125   BP 3 132/61   MAP 3 (Calculated) 85   Patient Position 3 Standing

## 2022-01-26 NOTE — PROGRESS NOTES
Pt is medically cleared for dc today and will transfer to room 223 at [facility] for STR services. Transport changed to 11am through Alfred RN to call report to #[phone]. Spouse was notified yesterday of pt's dc and transport time. SW remains available to assist as needed. Care Management Interventions  PCP Verified by CM: Yes  Mode of Transport at Discharge: [address]  [address] Time of Discharge: 1100  Transition of Care Consult (CM Consult): SNF,Discharge Planning  Partner SNF: Yes  Discharge Durable Medical Equipment: No  Physical Therapy Consult: Yes  Occupational Therapy Consult: Yes  Speech Therapy Consult: No  Support Systems: Assisted Living,Other Family Member(s)  Confirm Follow Up Transport: Family  The Plan for Transition of Care is Related to the Following Treatment Goals : STR to improve pt's strength and functional abilities for a safe transition to home.   The Patient and/or Patient Representative was Provided with a Choice of Provider and Agrees with the Discharge Plan?: Yes  Freedom of Choice List was Provided with Basic Dialogue that Supports the Patient's Individualized Plan of Care/Goals, Treatment Preferences and Shares the Quality Data Associated with the Providers?: Yes  Discharge Location  Patient Expects to be Discharged to[de-identified] Rehab Unit Subacute ([facility])  Read-Only, Retired: Discharge Placement: Skilled nursing facility

## 2022-01-26 NOTE — PROGRESS NOTES
TRANSFER - OUT REPORT:    Verbal report given to Meliza Zuniga on Bebe Delgadillo  being transferred to [place] [address] for routine progression of care       Report consisted of patients Situation, Background, Assessment and   Recommendations(SBAR). Information from the following report(s) Kardex was reviewed with the receiving nurse. Opportunity for questions and clarification was provided.

## 2022-02-09 ENCOUNTER — HOSPITAL ENCOUNTER (EMERGENCY)
Age: 87
Discharge: HOME | End: 2022-02-09
Attending: EMERGENCY MEDICINE
Payer: MEDICARE

## 2022-02-09 ENCOUNTER — APPOINTMENT (OUTPATIENT)
Dept: CT IMAGING | Age: 87
End: 2022-02-09
Attending: EMERGENCY MEDICINE
Payer: MEDICARE

## 2022-02-09 ENCOUNTER — APPOINTMENT (OUTPATIENT)
Dept: GENERAL RADIOLOGY | Age: 87
End: 2022-02-09
Attending: EMERGENCY MEDICINE
Payer: MEDICARE

## 2022-02-09 VITALS
OXYGEN SATURATION: 100 % | TEMPERATURE: 98.3 F | HEART RATE: 72 BPM | DIASTOLIC BLOOD PRESSURE: 89 MMHG | WEIGHT: 152 LBS | BODY MASS INDEX: 23.11 KG/M2 | SYSTOLIC BLOOD PRESSURE: 179 MMHG | RESPIRATION RATE: 20 BRPM

## 2022-02-09 DIAGNOSIS — S01.81XA FACIAL LACERATION, INITIAL ENCOUNTER: ICD-10-CM

## 2022-02-09 DIAGNOSIS — I95.1 ORTHOSTATIC SYNCOPE: Primary | ICD-10-CM

## 2022-02-09 LAB
ALBUMIN SERPL-MCNC: 2.8 G/DL (ref 3.2–4.6)
ALBUMIN/GLOB SERPL: 0.8 {RATIO} (ref 1.2–3.5)
ALP SERPL-CCNC: 91 U/L (ref 50–136)
ALT SERPL-CCNC: 21 U/L (ref 12–65)
ANION GAP SERPL CALC-SCNC: 5 MMOL/L (ref 7–16)
AST SERPL-CCNC: 36 U/L (ref 15–37)
BASOPHILS # BLD: 0.1 K/UL (ref 0–0.2)
BASOPHILS NFR BLD: 1 % (ref 0–2)
BILIRUB SERPL-MCNC: 0.5 MG/DL (ref 0.2–1.1)
BUN SERPL-MCNC: 20 MG/DL (ref 8–23)
CALCIUM SERPL-MCNC: 8.1 MG/DL (ref 8.3–10.4)
CHLORIDE SERPL-SCNC: 113 MMOL/L (ref 98–107)
CO2 SERPL-SCNC: 25 MMOL/L (ref 21–32)
CREAT SERPL-MCNC: 0.9 MG/DL (ref 0.8–1.5)
DIFFERENTIAL METHOD BLD: ABNORMAL
EOSINOPHIL # BLD: 0.1 K/UL (ref 0–0.8)
EOSINOPHIL NFR BLD: 1 % (ref 0.5–7.8)
ERYTHROCYTE [DISTWIDTH] IN BLOOD BY AUTOMATED COUNT: 12.2 % (ref 11.9–14.6)
GLOBULIN SER CALC-MCNC: 3.7 G/DL (ref 2.3–3.5)
GLUCOSE SERPL-MCNC: 98 MG/DL (ref 65–100)
HCT VFR BLD AUTO: 38.5 % (ref 41.1–50.3)
HGB BLD-MCNC: 12.5 G/DL (ref 13.6–17.2)
IMM GRANULOCYTES # BLD AUTO: 0.1 K/UL (ref 0–0.5)
IMM GRANULOCYTES NFR BLD AUTO: 1 % (ref 0–5)
LYMPHOCYTES # BLD: 1.5 K/UL (ref 0.5–4.6)
LYMPHOCYTES NFR BLD: 14 % (ref 13–44)
MCH RBC QN AUTO: 31.4 PG (ref 26.1–32.9)
MCHC RBC AUTO-ENTMCNC: 32.5 G/DL (ref 31.4–35)
MCV RBC AUTO: 96.7 FL (ref 79.6–97.8)
MONOCYTES # BLD: 0.8 K/UL (ref 0.1–1.3)
MONOCYTES NFR BLD: 7 % (ref 4–12)
NEUTS SEG # BLD: 8.4 K/UL (ref 1.7–8.2)
NEUTS SEG NFR BLD: 77 % (ref 43–78)
NRBC # BLD: 0 K/UL (ref 0–0.2)
PLATELET # BLD AUTO: 192 K/UL (ref 150–450)
PMV BLD AUTO: 11.1 FL (ref 9.4–12.3)
POTASSIUM SERPL-SCNC: 4.9 MMOL/L (ref 3.5–5.1)
PROT SERPL-MCNC: 6.5 G/DL (ref 6.3–8.2)
RBC # BLD AUTO: 3.98 M/UL (ref 4.23–5.6)
SODIUM SERPL-SCNC: 143 MMOL/L (ref 136–145)
WBC # BLD AUTO: 10.9 K/UL (ref 4.3–11.1)

## 2022-02-09 PROCEDURE — 94762 N-INVAS EAR/PLS OXIMTRY CONT: CPT

## 2022-02-09 PROCEDURE — 70450 CT HEAD/BRAIN W/O DYE: CPT

## 2022-02-09 PROCEDURE — 93005 ELECTROCARDIOGRAM TRACING: CPT | Performed by: EMERGENCY MEDICINE

## 2022-02-09 PROCEDURE — 72125 CT NECK SPINE W/O DYE: CPT

## 2022-02-09 PROCEDURE — 80053 COMPREHEN METABOLIC PANEL: CPT

## 2022-02-09 PROCEDURE — 71045 X-RAY EXAM CHEST 1 VIEW: CPT

## 2022-02-09 PROCEDURE — 85025 COMPLETE CBC W/AUTO DIFF WBC: CPT

## 2022-02-09 PROCEDURE — 74011000250 HC RX REV CODE- 250: Performed by: EMERGENCY MEDICINE

## 2022-02-09 PROCEDURE — 99285 EMERGENCY DEPT VISIT HI MDM: CPT

## 2022-02-09 PROCEDURE — 75810000293 HC SIMP/SUPERF WND  RPR

## 2022-02-09 RX ORDER — SODIUM CHLORIDE 0.9 % (FLUSH) 0.9 %
5-10 SYRINGE (ML) INJECTION EVERY 8 HOURS
Status: DISCONTINUED | OUTPATIENT
Start: 2022-02-09 | End: 2022-02-09 | Stop reason: HOSPADM

## 2022-02-09 RX ORDER — BACITRACIN 500 [USP'U]/G
OINTMENT TOPICAL ONCE
Status: COMPLETED | OUTPATIENT
Start: 2022-02-09 | End: 2022-02-09

## 2022-02-09 RX ORDER — SODIUM CHLORIDE 0.9 % (FLUSH) 0.9 %
5-10 SYRINGE (ML) INJECTION AS NEEDED
Status: DISCONTINUED | OUTPATIENT
Start: 2022-02-09 | End: 2022-02-09 | Stop reason: HOSPADM

## 2022-02-09 RX ADMIN — BACITRACIN: 500 OINTMENT TOPICAL at 15:20

## 2022-02-09 RX ADMIN — Medication 3 ML: at 15:31

## 2022-02-09 NOTE — ED NOTES
I have reviewed discharge instructions with the patient, spouse and caregiver. The patient, spouse and caregiver verbalized understanding. Patient left ED via Discharge Method: stretcher to SNF with  [facility] for questions and clarification provided. Patient given 0 scripts. To continue your aftercare when you leave the hospital, you may receive an automated call from our care team to check in on how you are doing. This is a free service and part of our promise to provide the best care and service to meet your aftercare needs.  If you have questions, or wish to unsubscribe from this service please call [phone]. Thank you for Choosing our [address] [place] [address] Emergency Department.

## 2022-02-09 NOTE — DISCHARGE INSTRUCTIONS
Patient Education        Cuts on the Face Closed With Stitches: Care Instructions  Your Care Instructions  A cut on your face can be on your chin, cheek, nose, forehead, eyelid, lip, or ear. The doctor used stitches to close the cut. Using stitches helps the cut heal and reduces scarring. The doctor may also have called in a specialist, such as a plastic surgeon, to close the cut. If the cut went deep and through the skin, the doctor may have put in two layers of stitches. The deeper layer brings the deep part of the cut together. These stitches will dissolve and don't need to be removed. The stitches in the upper layer are the ones you see on the cut. You will probably have a bandage. You will need to have the stitches removed, usually in 3 to 5 days. The doctor has checked you carefully, but problems can develop later. If you notice any problems or new symptoms, get medical treatment right away. Follow-up care is a key part of your treatment and safety. Be sure to make and go to all appointments, and call your doctor if you are having problems. It's also a good idea to know your test results and keep a list of the medicines you take. How can you care for yourself at home? · Keep the cut dry for the first 24 to 48 hours. After this, you can shower if your doctor okays it. Pat the cut dry. · Don't soak the cut, such as in a bathtub. Your doctor will tell you when it's safe to get the cut wet. · If your doctor told you how to care for your cut, follow your doctor's instructions. If you did not get instructions, follow this general advice:  ? After the first 24 to 48 hours, wash around the cut with clean water 2 times a day. Don't use hydrogen peroxide or alcohol, which can slow healing. ? You may cover the cut with a thin layer of petroleum jelly, such as Vaseline, and a nonstick bandage. ? Apply more petroleum jelly and replace the bandage as needed.   · Avoid any activity that could cause your cut to reopen. · Do not remove the stitches on your own. Your doctor will tell you when to come back to have the stitches removed. · Be safe with medicines. Take pain medicines exactly as directed. ? If the doctor gave you a prescription medicine for pain, take it as prescribed. ? If you are not taking a prescription pain medicine, ask your doctor if you can take an over-the-counter medicine. When should you call for help? Call your doctor now or seek immediate medical care if:    · You have new pain, or your pain gets worse.     · The skin near the cut is cold or pale or changes color.     · You have tingling, weakness, or numbness near the cut.     · The cut starts to bleed, and blood soaks through the bandage. Oozing small amounts of blood is normal.     · You have symptoms of infection, such as:  ? Increased pain, swelling, warmth, or redness around the cut.  ? Red streaks leading from the cut.  ? Pus draining from the cut.  ? A fever. Watch closely for changes in your health, and be sure to contact your doctor if:    · You do not get better as expected. Where can you learn more? Go to http://www.gray.com/  Enter S314 in the search box to learn more about \"Cuts on the Face Closed With Stitches: Care Instructions. \"  Current as of: July 1, 2021               Content Version: 13.0  © 3927-0858 [employer]. Care instructions adapted under license by [employer] (which disclaims liability or warranty for this information). If you have questions about a medical condition or this instruction, always ask your healthcare professional. [place] [address] any warranty or liability for your use of this information. Patient Education        Low Blood Pressure: Care Instructions  Your Care Instructions     Blood pressure is a measurement of the force of the blood against the walls of the blood vessels during and after each beat of the heart.  Low blood pressure is also called hypotension. It means that your blood pressure is much lower than normal. Some people, especially young, slim women, may have slightly low blood pressure without symptoms. But in many people, low blood pressure can cause symptoms such as feeling dizzy or lightheaded. When your blood pressure is too low, your heart, brain, and other organs do not get enough blood. Low blood pressure can be caused by many things, including heart problems and some medicines. Diabetes that is not under control can cause your blood pressure to drop. And so can a severe allergic reaction or infection. Another cause is dehydration, which is when your body loses too much fluid. Treatment for low blood pressure depends on the cause. Follow-up care is a key part of your treatment and safety. Be sure to make and go to all appointments, and call your doctor if you are having problems. It's also a good idea to know your test results and keep a list of the medicines you take. How can you care for yourself at home? · Be safe with medicines. Call your doctor if you think you are having a problem with your medicine. You will get more details on the specific medicines your doctor prescribes. · If you feel dizzy or lightheaded, sit down or lie down for a few minutes. Or you can sit down and put your head between your knees. This will help your blood pressure go back to normal and help your symptoms go away. · Follow your doctor's suggestions for ways to prevent symptoms like dizziness. These suggestions may include:  ? Get up slowly from bed or after sitting for a long time. If you are in bed, roll to your side and swing your legs over the edge of the bed and onto the floor. Push your body up to a sitting position. Wait for a while before you slowly stand up.  ? Add more salt to your diet, if your doctor recommends it. ? Drink plenty of fluids. Choose water and other clear liquids.  If you have kidney, heart, or liver disease and have to limit fluids, talk with your doctor before you increase the amount of fluids you drink. ? Avoid or limit alcohol to 2 drinks a day for men and 1 drink a day for women. Alcohol may interfere with your medicine. In addition, alcohol can make your low blood pressure worse by causing your body to lose water. ? Wear compression stockings to help improve blood flow. When should you call for help? Call 911 anytime you think you may need emergency care. For example, call if:    · You passed out (lost consciousness). Call your doctor now or seek immediate medical care if:    · You are dizzy or lightheaded, or you feel like you may faint. Watch closely for changes in your health, and be sure to contact your doctor if you have any problems. Where can you learn more? Go to http://www.gray.com/  Enter C304 in the search box to learn more about \"Low Blood Pressure: Care Instructions. \"  Current as of: April 29, 2021               Content Version: 13.0  © 2006-2021 [employer]. Care instructions adapted under license by [employer] (which disclaims liability or warranty for this information). If you have questions about a medical condition or this instruction, always ask your healthcare professional. [address] any warranty or liability for your use of this information.

## 2022-02-09 NOTE — ED PROVIDER NOTES
Patient presents emerge department after a syncopal episode at rehab facility where he is currently staying. 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 He has had several similar episodes in the past.  He is currently anticoagulated. He denies any symptoms prior to the syncopal episode. He did sustain a head injury and in the skin tear to the right arm. He complains of some mild neck pain at the base of the left side of the neck. He denies any other injuries and has no other complaints. The history is provided by the patient and medical records. Syncope   This is a recurrent problem. The current episode started 1 to 2 hours ago. Episode frequency: Intermittently. The problem has been resolved. Length of episode of loss of consciousness: Unknown duration. The problem is associated with standing up. Associated symptoms include head injury. Pertinent negatives include no visual change, no chest pain, no palpitations, no clumsiness, no confusion, no fever, no malaise/fatigue, no abdominal pain, no bowel incontinence, no bladder incontinence, no congestion, no headaches, no back pain, no focal weakness, no light-headedness, no seizures, no slurred speech, no melena and no anal bleeding. Treatments tried: Dressing applied to right arm. The treatment provided no relief.         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 Karla Lynn)     Cognitive changes 7/2/2018    Colon polyp 7/26/2016    Overview:   (f/u in 2012)     Diplopia 7/26/2016    (Dr. Francis Barrow and Dr. 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 Eris traore     Malignant neoplasm of prostate ([facility] [address] 9/13/2010    Overview:  (Dr. eVrna Potter)     Nodular prostate without urinary obstruction 11/27/2013    Parkinson's disease ([facility] [address] 07/26/2016    followed by Dr. 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 Flora Carr)    HX CORONARY ARTERY BYPASS GRAFT  01/2011    CABG x7 (Dr. Annamaria Colby)    HX CYST REMOVAL  9/19/2012    cyst excision    HX HEENT  1998    pt had a hot needle to soft palate to help snoring    HX OTHER SURGICAL Right     retina sx     HX OTHER SURGICAL  2/20011    Bilateral Vats Ablation    HX PROSTATECTOMY      ROBOTIC    HX UROLOGICAL  2002?     circumcision    HX UROLOGICAL  2017    PROSTATE BIOPSY    MT CARDIAC SURG PROCEDURE UNLIST  2007    cabg          Family History:   Problem Relation Age of Onset    Lung Disease Mother         copd    Cancer Sister     Cancer Sister     Hypertension Other         GEN FAM HX        Social History     Socioeconomic History    Marital status:      Spouse name: Not on file    Number of children: Not on file    Years of education: Not on file    Highest education level: Not on file Occupational History    Not on file   Tobacco Use    Smoking status: Former Smoker     Packs/day: 1.00     Years: 22.00     Pack years: 22.00     Quit date:      Years since quittin.1    Smokeless tobacco: Never Used    Tobacco comment:    Substance and Sexual Activity    Alcohol use: Yes     Alcohol/week: 0.8 standard drinks     Types: 1 Glasses of wine per week     Comment: daily at dinner    Drug use: No    Sexual activity: Not on file   Other Topics Concern    Not on file   Social History Narrative    Not on file     Social Determinants of Health     Financial Resource Strain:     Difficulty of Paying Living Expenses: Not on file   Food Insecurity:     Worried About Running Out of Food in the Last Year: Not on file    Khurram of Food in the Last Year: Not on file   Transportation Needs:     Lack of Transportation (Medical): Not on file    Lack of Transportation (Non-Medical):  Not on file   Physical Activity:     Days of Exercise per Week: Not on file    Minutes of Exercise per Session: Not on file   Stress:     Feeling of Stress : Not on file   Social Connections:     Frequency of Communication with Friends and Family: Not on file    Frequency of Social Gatherings with Friends and Family: Not on file    Attends Yazidism Services: Not on file    Active Member of [address] [place] [address] [employer] or Organizations: Not on file    Attends Club or Organization Meetings: Not on file    Marital Status: Not on file   Intimate Partner Violence:     Fear of Current or Ex-Partner: Not on file    Emotionally Abused: Not on file    Physically Abused: Not on file    Sexually Abused: Not on file   Housing Stability:     Unable to Pay for Housing in the Last Year: Not on file    Number of [place] in the Last Year: Not on file    Unstable Housing in the Last Year: Not on file         ALLERGIES: Adhesive tape and Geodon [ziprasidone hcl]    Review of Systems   Constitutional: Negative for chills, fever and malaise/fatigue. HENT: Negative for congestion. Cardiovascular: Positive for syncope. Negative for chest pain and palpitations. Gastrointestinal: Negative for abdominal pain, anal bleeding, bowel incontinence and melena. Genitourinary: Negative for bladder incontinence. Musculoskeletal: Negative for back pain. Neurological: Negative for focal weakness, seizures, light-headedness and headaches. Psychiatric/Behavioral: Negative for confusion. All other systems reviewed and are negative. Vitals:    02/09/22 1310   BP: (!) 203/86   Pulse: (!) 59   Resp: 18   Temp: 98.3 °F (36.8 °C)   SpO2: 99%   Weight: 68.9 kg (152 lb)            Physical Exam  Vitals and nursing note reviewed. Constitutional:       General: He is not in acute distress. Appearance: Normal appearance. He is not ill-appearing, toxic-appearing or diaphoretic. HENT:      Head: Normocephalic. Comments: Dried blood from a small laceration noted in the left eyebrow. Nose: Nose normal.      Mouth/Throat:      Mouth: Mucous membranes are moist.      Pharynx: Oropharynx is clear. Eyes:      Extraocular Movements: Extraocular movements intact. Conjunctiva/sclera: Conjunctivae normal.      Pupils: Pupils are equal, round, and reactive to light. Cardiovascular:      Rate and Rhythm: Normal rate and regular rhythm. Pulses: Normal pulses. Heart sounds: Normal heart sounds. Pulmonary:      Effort: Pulmonary effort is normal.      Breath sounds: Normal breath sounds. Abdominal:      General: There is no distension. Palpations: Abdomen is soft. Tenderness: There is no abdominal tenderness. There is no right CVA tenderness, guarding or rebound. Musculoskeletal:         General: Normal range of motion. Cervical back: Normal range of motion and neck supple. Comments: Small skin tear noted to the proximal right forearm.   No evidence of fracture or dislocation on examination of all extremities. No tenderness to the spine. Skin:     General: Skin is warm and dry. Capillary Refill: Capillary refill takes less than 2 seconds. Neurological:      General: No focal deficit present. Mental Status: He is alert. Mental status is at baseline. Psychiatric:         Mood and Affect: Mood normal.         Behavior: Behavior normal.         Thought Content: Thought content normal.          MDM  Number of Diagnoses or Management Options     Amount and/or Complexity of Data Reviewed  Clinical lab tests: ordered and reviewed  Tests in the radiology section of CPT®: ordered and reviewed  Review and summarize past medical records: yes  Independent visualization of images, tracings, or specimens: yes    Risk of Complications, Morbidity, and/or Mortality  Presenting problems: moderate  Diagnostic procedures: moderate  Management options: moderate    Patient Progress  Patient progress: stable         Wound Repair    Date/Time: 2/9/2022 4:06 PM  Performed by: attendingPreparation: skin prepped with ChloraPrep  Pre-procedure re-eval: Immediately prior to the procedure, the patient was reevaluated and found suitable for the planned procedure and any planned medications. Location details: face  Wound length:2.5 cm or less    Anesthesia:  Local Anesthetic: LET (lido, epi, tetracaine)  Foreign bodies: no foreign bodies  Irrigation solution: saline  Debridement: none  Skin closure: glue  Approximation: close  Patient tolerance: patient tolerated the procedure well with no immediate complications  My total time at bedside, performing this procedure was 1-15 minutes.

## 2022-02-09 NOTE — ED TRIAGE NOTES
Patient presents via [facility] from rehab facility. Patient with witnessed syncopal episode. Patient fell striking his head. Patient history orthostatics. Patient takes eloquis and takes midodrine. Orthostatics initial 140SBP to 60 SBP upon standing. Patient with complaints of pain at site of laceration. Last BP on EMS arrival 200/100   300ml of IVF enroute.  20g RAC

## 2022-02-10 LAB
ATRIAL RATE: 60 BPM
CALCULATED P AXIS, ECG09: 18 DEGREES
CALCULATED R AXIS, ECG10: -16 DEGREES
CALCULATED T AXIS, ECG11: -16 DEGREES
DIAGNOSIS, 93000: NORMAL
P-R INTERVAL, ECG05: 163 MS
Q-T INTERVAL, ECG07: 455 MS
QRS DURATION, ECG06: 161 MS
QTC CALCULATION (BEZET), ECG08: 455 MS
VENTRICULAR RATE, ECG03: 60 BPM

## 2022-03-19 PROBLEM — G93.40 ACUTE ENCEPHALOPATHY: Status: ACTIVE | Noted: 2022-01-15

## 2022-03-19 PROBLEM — R41.89 COGNITIVE CHANGES: Status: ACTIVE | Noted: 2018-07-02

## 2022-03-19 PROBLEM — G20 DYSKINESIA DUE TO PARKINSON'S DISEASE (HCC): Status: ACTIVE | Noted: 2019-10-30

## 2022-03-19 PROBLEM — R44.3 HALLUCINATION: Status: ACTIVE | Noted: 2021-01-13

## 2022-03-19 PROBLEM — J15.9 BACTERIAL PNEUMONIA: Status: ACTIVE | Noted: 2022-01-13

## 2022-03-19 PROBLEM — G24.9 DYSKINESIA DUE TO PARKINSON'S DISEASE (HCC): Status: ACTIVE | Noted: 2019-10-30

## 2022-03-19 PROBLEM — G93.41 METABOLIC ENCEPHALOPATHY: Status: ACTIVE | Noted: 2022-01-13

## 2022-03-20 PROBLEM — G20 COGNITIVE DEFICIT DUE TO PARKINSON'S DISEASE (HCC): Status: ACTIVE | Noted: 2018-07-02

## 2022-06-10 ENCOUNTER — HOSPITAL ENCOUNTER (EMERGENCY)
Age: 87
Discharge: NURSING, REHAB OR LONG-TERM CARE | End: 2022-06-10
Attending: EMERGENCY MEDICINE | Admitting: EMERGENCY MEDICINE
Payer: MEDICARE

## 2022-06-10 VITALS
SYSTOLIC BLOOD PRESSURE: 183 MMHG | DIASTOLIC BLOOD PRESSURE: 103 MMHG | WEIGHT: 152 LBS | RESPIRATION RATE: 16 BRPM | HEART RATE: 67 BPM | TEMPERATURE: 97.7 F | BODY MASS INDEX: 23.04 KG/M2 | OXYGEN SATURATION: 98 % | HEIGHT: 68 IN
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
## 2022-06-10 DIAGNOSIS — S01.01XA LACERATION OF SCALP, INITIAL ENCOUNTER: ICD-10-CM

## 2022-06-10 PROCEDURE — 12002 RPR S/N/AX/GEN/TRNK2.6-7.5CM: CPT

## 2022-06-10 PROCEDURE — 99283 EMERGENCY DEPT VISIT LOW MDM: CPT

## 2022-06-10 ASSESSMENT — PAIN - FUNCTIONAL ASSESSMENT: PAIN_FUNCTIONAL_ASSESSMENT: NONE - DENIES PAIN

## 2022-06-10 NOTE — ED TRIAGE NOTES
Pt arrived via EMS from [facility] c/o fall out of bed to the carpet. Noted lac to left side of head. Bleeding controlled. Pt confused at baseline. Denies LOC and blood thinners.

## 2022-06-10 NOTE — ED PROVIDER NOTES
[employer] Emergency Department Provider Note                   PCP:                None Provider               Age: 82 y.o. Sex: male     No diagnosis found. DISPOSITION         New Prescriptions    No medications on file       No orders of the defined types were placed in this encounter. Pankaj Chilel MD, MD 3:53 AM      MDM  Number of Diagnoses or Management Options  Fall from bed, initial encounter: new, no workup  Laceration of scalp, initial encounter: new, no workup     Amount and/or Complexity of Data Reviewed  Obtain history from someone other than the patient: yes  Review and summarize past medical records: yes    Risk of Complications, Morbidity, and/or Mortality  Presenting problems: low  Diagnostic procedures: minimal  Management options: low    Patient Progress  Patient progress: improved       Chela Hernandez is a 82 y.o. male who presents to the Emergency Department with chief complaint of    Chief Complaint   Patient presents with    Fall      62-year-old male with history of dementia on hospice presents to the emergency department with the hospice nurse for laceration to the top of the head incurred when he fell out of bed. There is no loss of consciousness. According to hospice nurse, there is to be no intervention other than repairing the laceration. They were unable to repair with Steri-Strips at the nursing home. Patient is a DO NOT RESUSCITATE and do not intervene other than that. Patient has no complaints at this time. The history is provided by the patient and a caregiver. The history is limited by the condition of the patient. All other systems reviewed and are negative.     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 Gaby Aparicio)    Elías Peterson Cognitive changes 7/2/2018    Colon polyp 7/26/2016    Overview:   (f/u in 2012)     Diplopia 7/26/2016    (Dr. Kaila Kim and Dr. 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 Walter tejeda     Malignant neoplasm of prostate ([address] 9/13/2010    Overview:  (Dr. Tawanda Sandhu)     Nodular prostate without urinary obstruction 11/27/2013    Parkinson's disease ([address] 07/26/2016    followed by Dr. 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 Surinder Gan)   [address] GRAFT  01/2011    CABG x7 (Dr. Amor Pena)    CYST REMOVAL  9/19/2012    cyst excision    HEENT  1998    pt had a hot needle to soft palate to help snoring    OTHER SURGICAL HISTORY Right     retina sx     OTHER SURGICAL HISTORY  2/20011    Bilateral Vats Ablation    MI CARDIAC SURG PROCEDURE UNLIST  2007    cabg     PROSTATECTOMY      ROBOTIC    UROLOGICAL SURGERY  2002?     circumcision    UROLOGICAL SURGERY  2017    PROSTATE BIOPSY        Family History   Problem Relation Age of Onset    Lung Disease Mother         copd    Cancer Sister     Cancer Sister     Hypertension Other         GEN FAM HX            Social Connections:     Frequency of Communication with Friends and Family: Not on file    Frequency of Social Gatherings with Friends and Family: Not on file    Attends Confucianist Services: Not on file    Active Member of Clubs or Organizations: Not on file    Attends Club or Organization Meetings: Not on file    Marital Status: Not on file        Allergies   Allergen Reactions    Ziprasidone Hcl Other (See Comments)     Contraindicated with parkinsons    Adhesive Tape Rash        Vitals signs and nursing note reviewed. Patient Vitals for the past 4 hrs:   Temp Pulse Resp BP SpO2   06/10/22 0302 -- -- -- (!) 180/71 97 %   06/10/22 0242 -- -- -- (!) 172/86 96 %   06/10/22 0232 -- -- -- (!) 173/88 96 %   06/10/22 0212 -- -- -- (!) 187/82 96 %   06/10/22 0202 -- -- -- (!) 161/80 96 %   06/10/22 0142 -- -- -- (!) 178/81 96 %   06/10/22 0132 -- -- -- (!) 172/74 94 %   06/10/22 0108 97.7 °F (36.5 °C) 67 16 (!) 181/73 95 %          Physical Exam  Vitals and nursing note reviewed. Constitutional:       General: He is not in acute distress. HENT:      Head: Normocephalic. Laceration present. Jaw: There is normal jaw occlusion. Right Ear: External ear normal.      Left Ear: External ear normal.      Nose: Nose normal.      Mouth/Throat:      Mouth: Mucous membranes are moist.   Eyes:      Extraocular Movements: Extraocular movements intact. Conjunctiva/sclera: Conjunctivae normal.      Pupils: Pupils are equal, round, and reactive to light. Cardiovascular:      Rate and Rhythm: Normal rate and regular rhythm. Heart sounds: No murmur heard. Pulmonary:      Effort: Pulmonary effort is normal.      Breath sounds: Normal breath sounds. Abdominal:      General: Bowel sounds are normal.      Palpations: Abdomen is soft. Tenderness: There is no abdominal tenderness. Musculoskeletal:         General: Normal range of motion. Cervical back: Normal range of motion and neck supple. Skin:     General: Skin is warm and dry. Neurological:      General: No focal deficit present. Mental Status: He is alert. Cranial Nerves: Cranial nerves are intact. Sensory: Sensation is intact. Motor: Tremor (Parkinsonian) present. Psychiatric:         Mood and Affect: Mood normal.         Behavior: Behavior normal.          Lac Repair    Date/Time: 6/10/2022 5:30 AM  Performed by: Guido Anaya MD  Authorized by: Guido Anaya MD     Consent:     Consent obtained:  Verbal    Consent given by:  Patient    Risks discussed:  Pain and need for additional repair    Alternatives discussed:  No treatment  Anesthesia (see MAR for exact dosages): Anesthesia method:  Local infiltration    Local anesthetic:  Lidocaine 1% WITH epi  Laceration details:     Location:  Scalp    Scalp location:  L parietal    Length (cm):  4.5    Depth (mm):  3  Repair type:     Repair type:  Simple  Pre-procedure details:     Preparation:  Patient was prepped and draped in usual sterile fashion  Exploration:     Wound exploration: entire depth of wound probed and visualized      Wound extent: no foreign bodies/material noted and no underlying fracture noted      Contaminated: no    Treatment:     Area cleansed with:  Tanja    Amount of cleaning:  Standard    Irrigation solution:  Sterile saline    Irrigation method:  Syringe  Skin repair:     Repair method:  Staples    Number of staples:  4  Approximation:     Approximation:  Close  Post-procedure details:     Dressing:  Open (no dressing)    Patient tolerance of procedure: Tolerated well, no immediate complications       Voice dictation software was used during the making of this note. This software is not perfect and grammatical and other typographical errors may be present. This note has not been completely proofread for errors. Tony Burns MD  06/10/22 0186

## 2022-06-10 NOTE — ED NOTES
I have reviewed discharge instructions with the patient. The patient verbalized understanding. Patient left ED via Discharge Method: stretcher to SNF with EMS. Opportunity for questions and clarification provided. Patient given 0 scripts. To continue your aftercare when you leave the hospital, you may receive an automated call from our care team to check in on how you are doing. This is a free service and part of our promise to provide the best care and service to meet your aftercare needs.  If you have questions, or wish to unsubscribe from this service please call [phone]. Thank you for Choosing our [facility] Emergency Department.       Mandi Fowler RN  06/10/22 2635

## 2022-08-08 ENCOUNTER — TELEPHONE (OUTPATIENT)
Dept: NEUROLOGY | Age: 87
End: 2022-08-08

## 2022-08-08 NOTE — TELEPHONE ENCOUNTER
Pt's wife called along with their . Wife stated she is trying to get a letter stating pt can no longer make financial decisions due to his altered mental status. Pt resides at [facility] and has not been seen by Dr. Main Ching since 01/2022. Informed wife this will be discuss with Dr. Main Ching tomorrow when she returns to the office. Wife voiced understanding.

## (undated) DEVICE — SYR 3ML LL TIP 1/10ML GRAD --

## (undated) DEVICE — CANNULA NSL ORAL AD FOR CAPNOFLEX CO2 O2 AIRLFE

## (undated) DEVICE — NDL PRT INJ NSAF BLNT 18GX1.5 --

## (undated) DEVICE — CONTAINER PREFIL FRMLN 40ML --

## (undated) DEVICE — SYR 5ML 1/5 GRAD LL NSAF LF --

## (undated) DEVICE — CONNECTOR TBNG OD5-7MM O2 END DISP

## (undated) DEVICE — KENDALL RADIOLUCENT FOAM MONITORING ELECTRODE RECTANGULAR SHAPE: Brand: KENDALL

## (undated) DEVICE — FORCEPS BX L240CM JAW DIA2.8MM L CAP W/ NDL MIC MESH TOOTH